# Patient Record
Sex: FEMALE | Race: OTHER
[De-identification: names, ages, dates, MRNs, and addresses within clinical notes are randomized per-mention and may not be internally consistent; named-entity substitution may affect disease eponyms.]

---

## 2017-01-10 ENCOUNTER — MEDICATION RENEWAL (OUTPATIENT)
Age: 82
End: 2017-01-10

## 2017-01-11 ENCOUNTER — RX RENEWAL (OUTPATIENT)
Age: 82
End: 2017-01-11

## 2017-01-11 ENCOUNTER — APPOINTMENT (OUTPATIENT)
Dept: FAMILY MEDICINE | Facility: CLINIC | Age: 82
End: 2017-01-11

## 2017-02-23 ENCOUNTER — RX RENEWAL (OUTPATIENT)
Age: 82
End: 2017-02-23

## 2017-03-20 ENCOUNTER — RX RENEWAL (OUTPATIENT)
Age: 82
End: 2017-03-20

## 2017-03-22 ENCOUNTER — APPOINTMENT (OUTPATIENT)
Dept: FAMILY MEDICINE | Facility: CLINIC | Age: 82
End: 2017-03-22

## 2017-03-22 VITALS
BODY MASS INDEX: 26.31 KG/M2 | HEIGHT: 62 IN | SYSTOLIC BLOOD PRESSURE: 110 MMHG | TEMPERATURE: 98.4 F | OXYGEN SATURATION: 85 % | HEART RATE: 82 BPM | WEIGHT: 143 LBS | DIASTOLIC BLOOD PRESSURE: 62 MMHG

## 2017-03-23 LAB
APPEARANCE: CLEAR
BACTERIA: NEGATIVE
BILIRUBIN URINE: NEGATIVE
BLOOD URINE: NEGATIVE
COLOR: YELLOW
GLUCOSE QUALITATIVE U: NORMAL MG/DL
HYALINE CASTS: 0 /LPF
KETONES URINE: NEGATIVE
LEUKOCYTE ESTERASE URINE: NEGATIVE
MICROSCOPIC-UA: NORMAL
NITRITE URINE: NEGATIVE
PH URINE: 5.5
PROTEIN URINE: NEGATIVE MG/DL
RED BLOOD CELLS URINE: 4 /HPF
SPECIFIC GRAVITY URINE: 1.02
SQUAMOUS EPITHELIAL CELLS: 0 /HPF
UROBILINOGEN URINE: NORMAL MG/DL
WHITE BLOOD CELLS URINE: 1 /HPF

## 2017-04-28 ENCOUNTER — APPOINTMENT (OUTPATIENT)
Dept: FAMILY MEDICINE | Facility: CLINIC | Age: 82
End: 2017-04-28

## 2017-04-28 ENCOUNTER — LABORATORY RESULT (OUTPATIENT)
Age: 82
End: 2017-04-28

## 2017-04-28 VITALS
RESPIRATION RATE: 18 BRPM | DIASTOLIC BLOOD PRESSURE: 64 MMHG | TEMPERATURE: 98.6 F | SYSTOLIC BLOOD PRESSURE: 124 MMHG | HEART RATE: 80 BPM

## 2017-04-30 LAB
APPEARANCE: ABNORMAL
BILIRUBIN URINE: NEGATIVE
BLOOD URINE: ABNORMAL
COLOR: YELLOW
GLUCOSE QUALITATIVE U: NORMAL MG/DL
KETONES URINE: NEGATIVE
LEUKOCYTE ESTERASE URINE: ABNORMAL
NITRITE URINE: POSITIVE
PH URINE: 6
PROTEIN URINE: 30 MG/DL
SPECIFIC GRAVITY URINE: 1.02
UROBILINOGEN URINE: NORMAL MG/DL

## 2017-05-18 ENCOUNTER — OTHER (OUTPATIENT)
Age: 82
End: 2017-05-18

## 2017-05-19 LAB
APPEARANCE: ABNORMAL
BACTERIA: NEGATIVE
BILIRUBIN URINE: NEGATIVE
BLOOD URINE: ABNORMAL
COLOR: ABNORMAL
GLUCOSE QUALITATIVE U: NORMAL MG/DL
HYALINE CASTS: 0 /LPF
KETONES URINE: ABNORMAL
LEUKOCYTE ESTERASE URINE: ABNORMAL
MICROSCOPIC-UA: NORMAL
NITRITE URINE: POSITIVE
PH URINE: 6
PROTEIN URINE: 100 MG/DL
RED BLOOD CELLS URINE: 3 /HPF
SPECIFIC GRAVITY URINE: 1.02
SQUAMOUS EPITHELIAL CELLS: 1 /HPF
UROBILINOGEN URINE: NORMAL MG/DL
WHITE BLOOD CELLS URINE: 119 /HPF

## 2017-05-23 LAB — BACTERIA UR CULT: ABNORMAL

## 2017-05-25 ENCOUNTER — APPOINTMENT (OUTPATIENT)
Dept: FAMILY MEDICINE | Facility: CLINIC | Age: 82
End: 2017-05-25

## 2017-05-25 VITALS
HEART RATE: 78 BPM | BODY MASS INDEX: 25.76 KG/M2 | HEIGHT: 62 IN | OXYGEN SATURATION: 98 % | TEMPERATURE: 98.4 F | RESPIRATION RATE: 15 BRPM | SYSTOLIC BLOOD PRESSURE: 120 MMHG | DIASTOLIC BLOOD PRESSURE: 78 MMHG | WEIGHT: 140 LBS

## 2017-05-25 DIAGNOSIS — R30.0 DYSURIA: ICD-10-CM

## 2017-05-25 RX ORDER — CIPROFLOXACIN HYDROCHLORIDE 500 MG/1
500 TABLET, FILM COATED ORAL
Qty: 20 | Refills: 0 | Status: DISCONTINUED | COMMUNITY
Start: 2017-04-28 | End: 2017-05-25

## 2017-05-25 RX ORDER — LEVOFLOXACIN 500 MG/1
500 TABLET, FILM COATED ORAL DAILY
Qty: 10 | Refills: 4 | Status: DISCONTINUED | COMMUNITY
Start: 2017-05-19 | End: 2017-05-25

## 2017-05-25 RX ORDER — CIPROFLOXACIN HYDROCHLORIDE 500 MG/1
500 TABLET, FILM COATED ORAL TWICE DAILY
Qty: 14 | Refills: 0 | Status: DISCONTINUED | COMMUNITY
Start: 2017-05-19 | End: 2017-05-25

## 2017-05-26 LAB
ALBUMIN SERPL ELPH-MCNC: 4.3 G/DL
ALP BLD-CCNC: 33 U/L
ALT SERPL-CCNC: 12 U/L
ANION GAP SERPL CALC-SCNC: 16 MMOL/L
AST SERPL-CCNC: 16 U/L
BASOPHILS # BLD AUTO: 0.04 K/UL
BASOPHILS NFR BLD AUTO: 0.6 %
BILIRUB SERPL-MCNC: 0.3 MG/DL
BUN SERPL-MCNC: 34 MG/DL
CALCIUM SERPL-MCNC: 9.1 MG/DL
CHLORIDE SERPL-SCNC: 107 MMOL/L
CHOLEST SERPL-MCNC: 151 MG/DL
CHOLEST/HDLC SERPL: 2.7 RATIO
CO2 SERPL-SCNC: 17 MMOL/L
CREAT SERPL-MCNC: 1.6 MG/DL
EOSINOPHIL # BLD AUTO: 0.16 K/UL
EOSINOPHIL NFR BLD AUTO: 2.3 %
FERRITIN SERPL-MCNC: 28 NG/ML
FOLATE SERPL-MCNC: >20 NG/ML
GLUCOSE SERPL-MCNC: 166 MG/DL
HBA1C MFR BLD HPLC: 6.7 %
HCT VFR BLD CALC: 34.9 %
HDLC SERPL-MCNC: 56 MG/DL
HGB BLD-MCNC: 11.2 G/DL
IMM GRANULOCYTES NFR BLD AUTO: 0.1 %
IRON SATN MFR SERPL: 23 %
IRON SERPL-MCNC: 69 UG/DL
LDLC SERPL CALC-MCNC: 63 MG/DL
LYMPHOCYTES # BLD AUTO: 1.56 K/UL
LYMPHOCYTES NFR BLD AUTO: 22.6 %
MAN DIFF?: NORMAL
MCHC RBC-ENTMCNC: 29.6 PG
MCHC RBC-ENTMCNC: 32.1 GM/DL
MCV RBC AUTO: 92.1 FL
MONOCYTES # BLD AUTO: 0.48 K/UL
MONOCYTES NFR BLD AUTO: 7 %
NEUTROPHILS # BLD AUTO: 4.65 K/UL
NEUTROPHILS NFR BLD AUTO: 67.4 %
PLATELET # BLD AUTO: 241 K/UL
POTASSIUM SERPL-SCNC: 5.2 MMOL/L
PROT SERPL-MCNC: 6.9 G/DL
RBC # BLD: 3.79 M/UL
RBC # FLD: 14.5 %
SODIUM SERPL-SCNC: 140 MMOL/L
TIBC SERPL-MCNC: 299 UG/DL
TRIGL SERPL-MCNC: 161 MG/DL
TSH SERPL-ACNC: 1.16 UIU/ML
UIBC SERPL-MCNC: 230 UG/DL
VIT B12 SERPL-MCNC: 735 PG/ML
WBC # FLD AUTO: 6.9 K/UL

## 2017-06-05 ENCOUNTER — APPOINTMENT (OUTPATIENT)
Dept: FAMILY MEDICINE | Facility: CLINIC | Age: 82
End: 2017-06-05

## 2017-06-05 VITALS
HEART RATE: 74 BPM | BODY MASS INDEX: 25.76 KG/M2 | RESPIRATION RATE: 14 BRPM | TEMPERATURE: 98.4 F | OXYGEN SATURATION: 99 % | DIASTOLIC BLOOD PRESSURE: 70 MMHG | HEIGHT: 62 IN | SYSTOLIC BLOOD PRESSURE: 110 MMHG | WEIGHT: 140 LBS

## 2017-06-05 RX ORDER — NITROFURANTOIN MACROCRYSTALS 50 MG/1
50 CAPSULE ORAL
Qty: 14 | Refills: 0 | Status: DISCONTINUED | COMMUNITY
Start: 2017-05-25 | End: 2017-06-05

## 2017-06-21 ENCOUNTER — RX RENEWAL (OUTPATIENT)
Age: 82
End: 2017-06-21

## 2017-06-21 ENCOUNTER — APPOINTMENT (OUTPATIENT)
Dept: FAMILY MEDICINE | Facility: CLINIC | Age: 82
End: 2017-06-21

## 2017-06-21 VITALS
HEIGHT: 62 IN | HEART RATE: 80 BPM | DIASTOLIC BLOOD PRESSURE: 64 MMHG | OXYGEN SATURATION: 95 % | TEMPERATURE: 98.2 F | SYSTOLIC BLOOD PRESSURE: 107 MMHG

## 2017-06-22 ENCOUNTER — RX RENEWAL (OUTPATIENT)
Age: 82
End: 2017-06-22

## 2017-06-28 LAB
APPEARANCE: CLEAR
BILIRUBIN URINE: NEGATIVE
BLOOD URINE: NEGATIVE
COLOR: YELLOW
GLUCOSE QUALITATIVE U: NORMAL MG/DL
KETONES URINE: NEGATIVE
LEUKOCYTE ESTERASE URINE: NEGATIVE
NITRITE URINE: NEGATIVE
PH URINE: 5.5
PROTEIN URINE: NEGATIVE MG/DL
SPECIFIC GRAVITY URINE: 1.01
UROBILINOGEN URINE: NORMAL MG/DL

## 2017-07-17 ENCOUNTER — RX RENEWAL (OUTPATIENT)
Age: 82
End: 2017-07-17

## 2017-07-20 ENCOUNTER — RX RENEWAL (OUTPATIENT)
Age: 82
End: 2017-07-20

## 2017-07-21 ENCOUNTER — RX RENEWAL (OUTPATIENT)
Age: 82
End: 2017-07-21

## 2017-08-16 ENCOUNTER — RX RENEWAL (OUTPATIENT)
Age: 82
End: 2017-08-16

## 2017-08-18 ENCOUNTER — RX RENEWAL (OUTPATIENT)
Age: 82
End: 2017-08-18

## 2017-09-11 ENCOUNTER — LABORATORY RESULT (OUTPATIENT)
Age: 82
End: 2017-09-11

## 2017-09-11 ENCOUNTER — APPOINTMENT (OUTPATIENT)
Dept: FAMILY MEDICINE | Facility: CLINIC | Age: 82
End: 2017-09-11
Payer: MEDICARE

## 2017-09-11 VITALS
WEIGHT: 134 LBS | RESPIRATION RATE: 14 BRPM | SYSTOLIC BLOOD PRESSURE: 130 MMHG | DIASTOLIC BLOOD PRESSURE: 70 MMHG | BODY MASS INDEX: 24.66 KG/M2 | TEMPERATURE: 97.7 F | HEIGHT: 62 IN | HEART RATE: 76 BPM | OXYGEN SATURATION: 98 %

## 2017-09-11 PROCEDURE — 90688 IIV4 VACCINE SPLT 0.5 ML IM: CPT

## 2017-09-11 PROCEDURE — 36415 COLL VENOUS BLD VENIPUNCTURE: CPT

## 2017-09-11 PROCEDURE — G0008: CPT

## 2017-09-11 PROCEDURE — 99214 OFFICE O/P EST MOD 30 MIN: CPT | Mod: 25

## 2017-09-13 LAB
ALBUMIN SERPL ELPH-MCNC: 4.6 G/DL
ALP BLD-CCNC: 38 U/L
ALT SERPL-CCNC: 10 U/L
ANION GAP SERPL CALC-SCNC: 17 MMOL/L
APPEARANCE: ABNORMAL
AST SERPL-CCNC: 15 U/L
BASOPHILS # BLD AUTO: 0.07 K/UL
BASOPHILS NFR BLD AUTO: 1 %
BILIRUB SERPL-MCNC: 0.3 MG/DL
BILIRUBIN URINE: NEGATIVE
BLOOD URINE: NEGATIVE
BUN SERPL-MCNC: 27 MG/DL
CALCIUM SERPL-MCNC: 9.7 MG/DL
CHLORIDE SERPL-SCNC: 104 MMOL/L
CHOLEST SERPL-MCNC: 177 MG/DL
CHOLEST/HDLC SERPL: 3.4 RATIO
CK SERPL-CCNC: 47 U/L
CO2 SERPL-SCNC: 19 MMOL/L
COLOR: YELLOW
CREAT SERPL-MCNC: 1.39 MG/DL
CREAT SPEC-SCNC: 93 MG/DL
EOSINOPHIL # BLD AUTO: 0.39 K/UL
EOSINOPHIL NFR BLD AUTO: 5.3 %
GLUCOSE QUALITATIVE U: NORMAL MG/DL
GLUCOSE SERPL-MCNC: 127 MG/DL
HBA1C MFR BLD HPLC: 6.1 %
HCT VFR BLD CALC: 37.2 %
HDLC SERPL-MCNC: 52 MG/DL
HGB BLD-MCNC: 12 G/DL
IMM GRANULOCYTES NFR BLD AUTO: 0.3 %
KETONES URINE: NEGATIVE
LDLC SERPL CALC-MCNC: 85 MG/DL
LEUKOCYTE ESTERASE URINE: ABNORMAL
LYMPHOCYTES # BLD AUTO: 2.22 K/UL
LYMPHOCYTES NFR BLD AUTO: 30.4 %
MAN DIFF?: NORMAL
MCHC RBC-ENTMCNC: 29.6 PG
MCHC RBC-ENTMCNC: 32.3 GM/DL
MCV RBC AUTO: 91.9 FL
MICROALBUMIN 24H UR DL<=1MG/L-MCNC: 7.6 MG/DL
MICROALBUMIN/CREAT 24H UR-RTO: 82 MG/G
MONOCYTES # BLD AUTO: 0.51 K/UL
MONOCYTES NFR BLD AUTO: 7 %
NEUTROPHILS # BLD AUTO: 4.1 K/UL
NEUTROPHILS NFR BLD AUTO: 56 %
NITRITE URINE: NEGATIVE
PH URINE: 5.5
PLATELET # BLD AUTO: 270 K/UL
POTASSIUM SERPL-SCNC: 4.7 MMOL/L
PROT SERPL-MCNC: 7.6 G/DL
PROTEIN URINE: ABNORMAL MG/DL
RBC # BLD: 4.05 M/UL
RBC # FLD: 15.4 %
SODIUM SERPL-SCNC: 140 MMOL/L
SPECIFIC GRAVITY URINE: 1.02
TRIGL SERPL-MCNC: 201 MG/DL
UROBILINOGEN URINE: NORMAL MG/DL
WBC # FLD AUTO: 7.31 K/UL

## 2017-09-20 ENCOUNTER — RX RENEWAL (OUTPATIENT)
Age: 82
End: 2017-09-20

## 2017-09-20 ENCOUNTER — APPOINTMENT (OUTPATIENT)
Dept: FAMILY MEDICINE | Facility: CLINIC | Age: 82
End: 2017-09-20

## 2017-10-11 ENCOUNTER — RX RENEWAL (OUTPATIENT)
Age: 82
End: 2017-10-11

## 2017-10-17 ENCOUNTER — MEDICATION RENEWAL (OUTPATIENT)
Age: 82
End: 2017-10-17

## 2017-11-14 ENCOUNTER — MEDICATION RENEWAL (OUTPATIENT)
Age: 82
End: 2017-11-14

## 2017-11-15 ENCOUNTER — RX RENEWAL (OUTPATIENT)
Age: 82
End: 2017-11-15

## 2017-12-11 ENCOUNTER — APPOINTMENT (OUTPATIENT)
Dept: FAMILY MEDICINE | Facility: CLINIC | Age: 82
End: 2017-12-11
Payer: MEDICARE

## 2017-12-11 ENCOUNTER — LABORATORY RESULT (OUTPATIENT)
Age: 82
End: 2017-12-11

## 2017-12-11 VITALS — TEMPERATURE: 98 F | RESPIRATION RATE: 14 BRPM

## 2017-12-11 VITALS
OXYGEN SATURATION: 95 % | SYSTOLIC BLOOD PRESSURE: 136 MMHG | BODY MASS INDEX: 25.91 KG/M2 | DIASTOLIC BLOOD PRESSURE: 70 MMHG | HEART RATE: 88 BPM | WEIGHT: 132 LBS | HEIGHT: 60 IN

## 2017-12-11 PROCEDURE — 99214 OFFICE O/P EST MOD 30 MIN: CPT | Mod: 25

## 2017-12-11 PROCEDURE — G0009: CPT

## 2017-12-11 PROCEDURE — 90732 PPSV23 VACC 2 YRS+ SUBQ/IM: CPT

## 2017-12-13 LAB
APPEARANCE: ABNORMAL
BILIRUBIN URINE: NEGATIVE
BLOOD URINE: ABNORMAL
COLOR: YELLOW
GLUCOSE QUALITATIVE U: NEGATIVE MG/DL
KETONES URINE: NEGATIVE
LEUKOCYTE ESTERASE URINE: ABNORMAL
NITRITE URINE: POSITIVE
PH URINE: 5.5
PROTEIN URINE: 30 MG/DL
SPECIFIC GRAVITY URINE: 1.02
UROBILINOGEN URINE: NEGATIVE MG/DL

## 2017-12-14 ENCOUNTER — MEDICATION RENEWAL (OUTPATIENT)
Age: 82
End: 2017-12-14

## 2017-12-15 ENCOUNTER — RX RENEWAL (OUTPATIENT)
Age: 82
End: 2017-12-15

## 2018-01-10 ENCOUNTER — MEDICATION RENEWAL (OUTPATIENT)
Age: 83
End: 2018-01-10

## 2018-01-10 RX ORDER — NITROFURANTOIN (MONOHYDRATE/MACROCRYSTALS) 25; 75 MG/1; MG/1
100 CAPSULE ORAL
Qty: 14 | Refills: 1 | Status: DISCONTINUED | COMMUNITY
Start: 2017-06-05 | End: 2018-01-10

## 2018-02-09 ENCOUNTER — MEDICATION RENEWAL (OUTPATIENT)
Age: 83
End: 2018-02-09

## 2018-02-10 ENCOUNTER — RX RENEWAL (OUTPATIENT)
Age: 83
End: 2018-02-10

## 2018-03-12 ENCOUNTER — APPOINTMENT (OUTPATIENT)
Dept: FAMILY MEDICINE | Facility: CLINIC | Age: 83
End: 2018-03-12
Payer: MEDICARE

## 2018-03-12 VITALS
TEMPERATURE: 98.2 F | SYSTOLIC BLOOD PRESSURE: 118 MMHG | WEIGHT: 132 LBS | HEIGHT: 61 IN | HEART RATE: 75 BPM | OXYGEN SATURATION: 91 % | BODY MASS INDEX: 24.92 KG/M2 | DIASTOLIC BLOOD PRESSURE: 62 MMHG

## 2018-03-12 DIAGNOSIS — Z86.718 PERSONAL HISTORY OF OTHER VENOUS THROMBOSIS AND EMBOLISM: ICD-10-CM

## 2018-03-12 PROCEDURE — 36415 COLL VENOUS BLD VENIPUNCTURE: CPT

## 2018-03-12 PROCEDURE — 99214 OFFICE O/P EST MOD 30 MIN: CPT | Mod: 25

## 2018-03-14 LAB
ALBUMIN SERPL ELPH-MCNC: 4.3 G/DL
ALP BLD-CCNC: 41 U/L
ALT SERPL-CCNC: 14 U/L
ANION GAP SERPL CALC-SCNC: 21 MMOL/L
APPEARANCE: ABNORMAL
AST SERPL-CCNC: 16 U/L
BACTERIA: ABNORMAL
BASOPHILS # BLD AUTO: 0.06 K/UL
BASOPHILS NFR BLD AUTO: 0.9 %
BILIRUB SERPL-MCNC: 0.3 MG/DL
BILIRUBIN URINE: NEGATIVE
BLOOD URINE: ABNORMAL
BUN SERPL-MCNC: 38 MG/DL
CALCIUM OXALATE CRYSTALS: ABNORMAL
CALCIUM SERPL-MCNC: 10 MG/DL
CHLORIDE SERPL-SCNC: 107 MMOL/L
CHOLEST SERPL-MCNC: 206 MG/DL
CHOLEST/HDLC SERPL: 3.8 RATIO
CK SERPL-CCNC: 40 U/L
CO2 SERPL-SCNC: 15 MMOL/L
COLOR: ABNORMAL
CREAT SERPL-MCNC: 1.58 MG/DL
CREAT SPEC-SCNC: 200 MG/DL
EOSINOPHIL # BLD AUTO: 0.43 K/UL
EOSINOPHIL NFR BLD AUTO: 6.3 %
GLUCOSE QUALITATIVE U: NEGATIVE MG/DL
GLUCOSE SERPL-MCNC: 126 MG/DL
HBA1C MFR BLD HPLC: 6.5 %
HCT VFR BLD CALC: 35.4 %
HDLC SERPL-MCNC: 54 MG/DL
HGB BLD-MCNC: 11.6 G/DL
HYALINE CASTS: 2 /LPF
IMM GRANULOCYTES NFR BLD AUTO: 0.1 %
KETONES URINE: ABNORMAL
LDLC SERPL CALC-MCNC: 117 MG/DL
LEUKOCYTE ESTERASE URINE: ABNORMAL
LYMPHOCYTES # BLD AUTO: 1.96 K/UL
LYMPHOCYTES NFR BLD AUTO: 28.7 %
MAN DIFF?: NORMAL
MCHC RBC-ENTMCNC: 30.7 PG
MCHC RBC-ENTMCNC: 32.8 GM/DL
MCV RBC AUTO: 93.7 FL
MICROALBUMIN 24H UR DL<=1MG/L-MCNC: 19.5 MG/DL
MICROALBUMIN/CREAT 24H UR-RTO: 98 MG/G
MICROSCOPIC-UA: NORMAL
MONOCYTES # BLD AUTO: 0.6 K/UL
MONOCYTES NFR BLD AUTO: 8.8 %
NEUTROPHILS # BLD AUTO: 3.78 K/UL
NEUTROPHILS NFR BLD AUTO: 55.2 %
NITRITE URINE: NEGATIVE
PH URINE: 5
PLATELET # BLD AUTO: 244 K/UL
POTASSIUM SERPL-SCNC: 5.1 MMOL/L
PROT SERPL-MCNC: 7.4 G/DL
PROTEIN URINE: 30 MG/DL
RBC # BLD: 3.78 M/UL
RBC # FLD: 13.9 %
RED BLOOD CELLS URINE: 18 /HPF
SODIUM SERPL-SCNC: 143 MMOL/L
SPECIFIC GRAVITY URINE: 1.02
SQUAMOUS EPITHELIAL CELLS: 4 /HPF
T4 FREE SERPL-MCNC: 1.2 NG/DL
TRIGL SERPL-MCNC: 173 MG/DL
TSH SERPL-ACNC: 1.65 UIU/ML
UROBILINOGEN URINE: NEGATIVE MG/DL
VIT B12 SERPL-MCNC: 455 PG/ML
WBC # FLD AUTO: 6.84 K/UL
WHITE BLOOD CELLS URINE: >720 /HPF

## 2018-03-26 ENCOUNTER — RX RENEWAL (OUTPATIENT)
Age: 83
End: 2018-03-26

## 2018-03-29 ENCOUNTER — OUTPATIENT (OUTPATIENT)
Dept: OUTPATIENT SERVICES | Facility: HOSPITAL | Age: 83
LOS: 1 days | End: 2018-03-29
Payer: COMMERCIAL

## 2018-03-29 ENCOUNTER — APPOINTMENT (OUTPATIENT)
Dept: ULTRASOUND IMAGING | Facility: HOSPITAL | Age: 83
End: 2018-03-29
Payer: MEDICARE

## 2018-03-29 DIAGNOSIS — N39.0 URINARY TRACT INFECTION, SITE NOT SPECIFIED: ICD-10-CM

## 2018-03-29 PROCEDURE — 76770 US EXAM ABDO BACK WALL COMP: CPT | Mod: 26

## 2018-03-29 PROCEDURE — 76770 US EXAM ABDO BACK WALL COMP: CPT

## 2018-04-09 ENCOUNTER — RX RENEWAL (OUTPATIENT)
Age: 83
End: 2018-04-09

## 2018-04-16 ENCOUNTER — RX RENEWAL (OUTPATIENT)
Age: 83
End: 2018-04-16

## 2018-04-25 ENCOUNTER — RX RENEWAL (OUTPATIENT)
Age: 83
End: 2018-04-25

## 2018-04-27 ENCOUNTER — RX RENEWAL (OUTPATIENT)
Age: 83
End: 2018-04-27

## 2018-05-25 ENCOUNTER — RX RENEWAL (OUTPATIENT)
Age: 83
End: 2018-05-25

## 2018-06-08 ENCOUNTER — APPOINTMENT (OUTPATIENT)
Dept: FAMILY MEDICINE | Facility: CLINIC | Age: 83
End: 2018-06-08
Payer: MEDICARE

## 2018-06-08 VITALS
HEART RATE: 85 BPM | WEIGHT: 132 LBS | OXYGEN SATURATION: 96 % | SYSTOLIC BLOOD PRESSURE: 112 MMHG | HEIGHT: 60 IN | BODY MASS INDEX: 25.91 KG/M2 | TEMPERATURE: 98 F | DIASTOLIC BLOOD PRESSURE: 56 MMHG

## 2018-06-08 VITALS — RESPIRATION RATE: 16 BRPM

## 2018-06-08 DIAGNOSIS — Z87.440 PERSONAL HISTORY OF URINARY (TRACT) INFECTIONS: ICD-10-CM

## 2018-06-08 PROCEDURE — 99214 OFFICE O/P EST MOD 30 MIN: CPT

## 2018-06-08 NOTE — HISTORY OF PRESENT ILLNESS
[Coronary Artery Disease] : Coronary Artery Disease [Depression] : Depression [Diabetes Mellitus] : Diabetes Mellitus [Hyperlipidemia] : Hyperlipidemia [Hypertension] : Hypertension [FreeTextEntry6] : Issue is an 85-year-old female, who presents today for followup disease management. Patient states, that she's been in her usual state of health recently seen by urology for recurrent urinary tract infections. Patient's medical history is significant for anxiety with underlying depression well controlled. Ischemic heart disease, diabetes mellitus, type II hypertension, hyperlipidemia, and as stated earlier, recurrent urinary tract infections. Patient is awake, alert, and oriented x3, presently in no acute distress. She is calm, cooperative, well-groomed, and nourished. Patient denies any chest pain, shortness of breath, nausea, or vomiting, but still complains of occasional urinary symptoms, described as burning and frequency. Patient does have followup appointment with urology [Does not check] : Patient does not check blood glucose regularly [Understanding of foot care] : Patient expressed understanding of foot care [Retinopathy] : No retinopathy [Most Recent A1C: ___] : Most recent A1C was [unfilled] [Moderate Intensity] : Patient is currently on moderate intensity statin  therapy [EyeExamDate] : 06/17 [Does not check BP] : The patient is not checking blood pressure [<130/90] : Target blood pressure is  <130/90 [Target goal met] : BP target goal met [Doing Well] : Patient is doing well [Managed with medications] : managed with  medication [Moderate Intensity Therapy] : Patient is currently on moderate intensity statin  therapy [Symptoms Limit Activities] : Symptoms do not limit ~his/her~ activities [Stable] : Overall status has been stable [No New Symptoms] : Patient denies any new symptoms

## 2018-06-08 NOTE — REVIEW OF SYSTEMS
[Fever] : no fever [Chills] : no chills [Fatigue] : fatigue [Hot Flashes] : no hot flashes [Night Sweats] : no night sweats [Recent Change In Weight] : ~T no recent weight change [Negative] : Heme/Lymph

## 2018-06-08 NOTE — ASSESSMENT
[FreeTextEntry1] : Assessment and plan:\par \par 1. Hypertension excellent blood pressure control. We will continue amlodipine 5 mg p.o. daily, and lisinopril 20 mg with valsartan 160 mg. Patient's blood pressure is under excellent control. No change in present medical management.\par \par 2. Hyperlipidemia. Patient tolerating lovastatin 20 mg at bedtime. Cholesterol is under good control.\par \par 3. Diabetes mellitus, type II. Most recent hemoglobin A1c 6.5, which is good control. Patient will continue metformin 500 mg p.o. daily twice a day. We will continue to follow closely. Her kidney functions, presently stable, and no change.\par \par 4. Ischemic heart disease. Continue Xarelto patient presently chest pain-free and feeling well.\par \par 5. Anxiety/depression. Continue alprazolam and sertraline. Patient states that she is no longer on Zoloft feeling well without the medications.\par \par Patient will followup in approximately 3 months at that time. I will obtain comprehensive blood work.

## 2018-06-08 NOTE — HEALTH RISK ASSESSMENT
[] : No [No falls in past year] : Patient reported no falls in the past year [0] : 2) Feeling down, depressed, or hopeless: Not at all (0) [de-identified] : social [[id number]] : 0

## 2018-06-11 ENCOUNTER — RX RENEWAL (OUTPATIENT)
Age: 83
End: 2018-06-11

## 2018-06-25 ENCOUNTER — RX RENEWAL (OUTPATIENT)
Age: 83
End: 2018-06-25

## 2018-07-30 ENCOUNTER — RX RENEWAL (OUTPATIENT)
Age: 83
End: 2018-07-30

## 2018-08-06 ENCOUNTER — RX RENEWAL (OUTPATIENT)
Age: 83
End: 2018-08-06

## 2018-08-27 ENCOUNTER — RX RENEWAL (OUTPATIENT)
Age: 83
End: 2018-08-27

## 2018-09-07 ENCOUNTER — APPOINTMENT (OUTPATIENT)
Dept: FAMILY MEDICINE | Facility: CLINIC | Age: 83
End: 2018-09-07
Payer: MEDICARE

## 2018-09-07 VITALS
SYSTOLIC BLOOD PRESSURE: 116 MMHG | OXYGEN SATURATION: 96 % | BODY MASS INDEX: 25.91 KG/M2 | WEIGHT: 132 LBS | DIASTOLIC BLOOD PRESSURE: 60 MMHG | TEMPERATURE: 98.2 F | HEIGHT: 60 IN | HEART RATE: 91 BPM

## 2018-09-07 VITALS — RESPIRATION RATE: 16 BRPM

## 2018-09-07 DIAGNOSIS — R07.89 OTHER CHEST PAIN: ICD-10-CM

## 2018-09-07 PROCEDURE — 93000 ELECTROCARDIOGRAM COMPLETE: CPT | Mod: 59

## 2018-09-07 PROCEDURE — 99215 OFFICE O/P EST HI 40 MIN: CPT | Mod: 25

## 2018-09-07 PROCEDURE — 36415 COLL VENOUS BLD VENIPUNCTURE: CPT

## 2018-09-07 NOTE — PHYSICAL EXAM

## 2018-09-07 NOTE — REVIEW OF SYSTEMS
[Fatigue] : fatigue [Chest Pain] : chest pain [Anxiety] : anxiety [Negative] : Heme/Lymph [Fever] : no fever [Chills] : no chills [Hot Flashes] : no hot flashes [Night Sweats] : no night sweats [Recent Change In Weight] : ~T no recent weight change [Palpitations] : no palpitations [Leg Claudication] : no leg claudication [Lower Ext Edema] : no lower extremity edema [Orthopnea] : no orthopnea [Paroysmal Nocturnal Dyspnea] : no paroysmal nocturnal dyspnea

## 2018-09-07 NOTE — HEALTH RISK ASSESSMENT
[No falls in past year] : Patient reported no falls in the past year [0] : 2) Feeling down, depressed, or hopeless: Not at all (0) [] : No [[id number]] : 0

## 2018-09-07 NOTE — HISTORY OF PRESENT ILLNESS
[FreeTextEntry1] : See history of present illness [de-identified] : Issues 86-year-old female, who presents today for a followup disease management visit. She has multiple medical problems history of ischemic heart disease. Chest scratch that diabetes mellitus, type II, fatigue, generalized anxiety, hypertension, hyperlipidemia. Patient's chief complaint today is chest pressure. She denies any neck, arm or jaw discomfort. It's retrosternal localized on the left-hand side. She denies any nausea or vomiting. Presently. She is awake, alert, and oriented x3 in no acute distress. Stat electrocardiogram will be obtained now. Patient also complaining of mild to moderate fatigue. Her vital signs are all good.

## 2018-09-07 NOTE — ASSESSMENT
[FreeTextEntry1] : Assessment and plan:\par \par 1. Atypical chest pain/chest pressure. Electrocardiogram obtained shows no acute ST-T wave changes compared to old electrocardiogram, which was done by cardiology in 2015. There are no acute changes. But for completeness sake, and with patient's history, I would prefer comprehensive cardiac workup, which will include echocardiogram, cardiology evaluation and stress test.\par \par 2. Hypertension excellent blood pressure control. Continue lisinopril 20 mg p.o. daily with amlodipine 5 mg p.o. daily. Patient also taking valsartan 160 mg p.o. daily.\par \par 3. Hyperlipidemia. Patient tolerating lovastatin 20 mg p.o. daily.\par \par 4. Generalized anxiety continue alprazolam 0.25 mg as needed.\par \par Detailed discussion with patient regarding the chest discomfort, that she's been having, which may be multifactorial and also maybe secondary to the heat, that we've been experiencing, but for completeness sake, I do want to cardiology workup and if the patient does develop chest discomfort. I recommend she go directly to the emergency room. Patient said that she will if she experiences at.\par \par Face-to-face with patient 40 minutes driving of time was spent on physical exam, obtaining electrocardiogram. Detailed discussion with the significance of chest pain

## 2018-09-08 DIAGNOSIS — R89.9 UNSPECIFIED ABNORMAL FINDING IN SPECIMENS FROM OTHER ORGANS, SYSTEMS AND TISSUES: ICD-10-CM

## 2018-09-08 LAB
ALBUMIN SERPL ELPH-MCNC: 4.5 G/DL
ALP BLD-CCNC: 44 U/L
ALT SERPL-CCNC: 11 U/L
ANION GAP SERPL CALC-SCNC: 16 MMOL/L
AST SERPL-CCNC: 13 U/L
BASOPHILS # BLD AUTO: 0.05 K/UL
BASOPHILS NFR BLD AUTO: 0.5 %
BILIRUB SERPL-MCNC: 0.3 MG/DL
BUN SERPL-MCNC: 33 MG/DL
CALCIUM SERPL-MCNC: 9.5 MG/DL
CHLORIDE SERPL-SCNC: 110 MMOL/L
CHOLEST SERPL-MCNC: 264 MG/DL
CHOLEST/HDLC SERPL: 6.9 RATIO
CK SERPL-CCNC: 29 U/L
CO2 SERPL-SCNC: 15 MMOL/L
CREAT SERPL-MCNC: 1.58 MG/DL
EOSINOPHIL # BLD AUTO: 0.3 K/UL
EOSINOPHIL NFR BLD AUTO: 3.1 %
GLUCOSE SERPL-MCNC: 160 MG/DL
HBA1C MFR BLD HPLC: 6.6 %
HCT VFR BLD CALC: 34.5 %
HDLC SERPL-MCNC: 38 MG/DL
HGB BLD-MCNC: 10.7 G/DL
IMM GRANULOCYTES NFR BLD AUTO: 0.3 %
LDLC SERPL CALC-MCNC: 158 MG/DL
LYMPHOCYTES # BLD AUTO: 1.77 K/UL
LYMPHOCYTES NFR BLD AUTO: 18.1 %
MAN DIFF?: NORMAL
MCHC RBC-ENTMCNC: 28.8 PG
MCHC RBC-ENTMCNC: 31 GM/DL
MCV RBC AUTO: 93 FL
MONOCYTES # BLD AUTO: 0.62 K/UL
MONOCYTES NFR BLD AUTO: 6.4 %
NEUTROPHILS # BLD AUTO: 6.99 K/UL
NEUTROPHILS NFR BLD AUTO: 71.6 %
PLATELET # BLD AUTO: 201 K/UL
POTASSIUM SERPL-SCNC: 4.4 MMOL/L
PROT SERPL-MCNC: 6.9 G/DL
RBC # BLD: 3.71 M/UL
RBC # FLD: 15.8 %
SODIUM SERPL-SCNC: 141 MMOL/L
T4 FREE SERPL-MCNC: 1.3 NG/DL
TRIGL SERPL-MCNC: 339 MG/DL
TSH SERPL-ACNC: 1.53 UIU/ML
WBC # FLD AUTO: 9.76 K/UL

## 2018-09-12 PROBLEM — R89.9 ABNORMAL LABORATORY TEST RESULT: Status: ACTIVE | Noted: 2018-09-12

## 2018-09-13 LAB
FERRITIN SERPL-MCNC: 98 NG/ML
FOLATE SERPL-MCNC: >20 NG/ML
IRON SATN MFR SERPL: 21 %
IRON SERPL-MCNC: 46 UG/DL
TIBC SERPL-MCNC: 217 UG/DL
TRANSFERRIN SERPL-MCNC: 163 MG/DL
UIBC SERPL-MCNC: 171 UG/DL
VIT B12 SERPL-MCNC: 643 PG/ML

## 2018-09-26 ENCOUNTER — RX RENEWAL (OUTPATIENT)
Age: 83
End: 2018-09-26

## 2018-10-04 ENCOUNTER — RX RENEWAL (OUTPATIENT)
Age: 83
End: 2018-10-04

## 2018-10-21 ENCOUNTER — RX RENEWAL (OUTPATIENT)
Age: 83
End: 2018-10-21

## 2018-10-23 ENCOUNTER — MEDICATION RENEWAL (OUTPATIENT)
Age: 83
End: 2018-10-23

## 2018-11-02 ENCOUNTER — APPOINTMENT (OUTPATIENT)
Dept: FAMILY MEDICINE | Facility: CLINIC | Age: 83
End: 2018-11-02
Payer: MEDICARE

## 2018-11-02 VITALS
TEMPERATURE: 98.1 F | OXYGEN SATURATION: 96 % | DIASTOLIC BLOOD PRESSURE: 56 MMHG | SYSTOLIC BLOOD PRESSURE: 110 MMHG | HEART RATE: 95 BPM

## 2018-11-02 VITALS — RESPIRATION RATE: 16 BRPM

## 2018-11-02 PROCEDURE — 36415 COLL VENOUS BLD VENIPUNCTURE: CPT

## 2018-11-02 PROCEDURE — G0008: CPT

## 2018-11-02 PROCEDURE — 90472 IMMUNIZATION ADMIN EACH ADD: CPT

## 2018-11-02 PROCEDURE — G0009: CPT

## 2018-11-02 PROCEDURE — 99215 OFFICE O/P EST HI 40 MIN: CPT | Mod: 25

## 2018-11-02 PROCEDURE — 90670 PCV13 VACCINE IM: CPT

## 2018-11-02 PROCEDURE — 90686 IIV4 VACC NO PRSV 0.5 ML IM: CPT

## 2018-11-02 NOTE — REVIEW OF SYSTEMS
[Fever] : no fever [Chills] : no chills [Fatigue] : fatigue [Hot Flashes] : no hot flashes [Night Sweats] : no night sweats [Recent Change In Weight] : ~T no recent weight change [Chest Pain] : no chest pain [Palpitations] : no palpitations [Leg Claudication] : no leg claudication [Lower Ext Edema] : no lower extremity edema [Orthopnea] : no orthopnea [Paroysmal Nocturnal Dyspnea] : no paroysmal nocturnal dyspnea [Anxiety] : anxiety [Negative] : Heme/Lymph

## 2018-11-02 NOTE — HEALTH RISK ASSESSMENT
[No falls in past year] : Patient reported no falls in the past year [0] : 2) Feeling down, depressed, or hopeless: Not at all (0) [[id number]] : 0

## 2018-11-02 NOTE — PHYSICAL EXAM

## 2018-11-02 NOTE — HISTORY OF PRESENT ILLNESS
[FreeTextEntry1] : Please see history of present illness [de-identified] : Patient is an 86-year-old female, who presents today for followup disease management with a chief complaint of increasing fatigue. Patient had similar complaints at the last visit. Comprehensive blood work was obtained at that time, which showed that the patient is slightly anemic and with low iron stores, and low iron in the serum.\par \par Detailed discussion with patient regarding iron deficiency anemia. I will give patient test for fecal occult blood. Immunology and I will reobtain comprehensive blood work today.\par \par Medical history is significant for anxiety, diabetes mellitus, type II, fatigue, hypertension, hyperlipidemia.\par \par Patient's only complaint is fatigue. She denies any chest pain, shortness of breath, nausea, or vomiting.\par \par While speaking to the patient it appears that she gets a little confused, but she is awake, alert, and oriented x3. She also exhibits mild to moderate tremor, which is not usual for her

## 2018-11-02 NOTE — ASSESSMENT
[FreeTextEntry1] : Assessment and plan:\par \par During exam. Patient was a little confused, which is unusual for complaining of severe fatigue. Physical exam was basically unremarkable. No acute findings. I reviewed previous blood work, which show a mild anemia, and iron was low normal. Since the patient was a little bit confused, complaining of severe fatigue. I was considering the possibility of sending her into the hospital. She then told me that her daughter-in-law was waiting in the parking lot.\par \par Her daughter-in-law socially came in detail discussion on iron deficiency anemia. Patient needs to be worked up for a possible GI bleed test for fecal occult blood. Will be done. Comprehensive physical exam was done. No acute findings. Comprehensive blood work obtained. I discussed with patient and daughter-in-law. The importance to let me know if anything acute is happening. Symptoms persist changing character, confusion worsens, patient is to go directly to the emergency room.\par \par I was face-to-face with the patient and daughter-in-law 45 minutes.\par \par Patient has been on alprazolam for many years. Unfortunately, we cannot stop the medication, cold turkey. I had a detailed discussion with daughter-in-law also about these medications, which can be causing the confusion and also the extreme fatigue. It appears that the patient finished her medications before time in other words, it appears she took more than she should have this can be contributing to her confusion and persistent fatigue.

## 2018-11-05 LAB
BASOPHILS # BLD AUTO: 0.06 K/UL
BASOPHILS NFR BLD AUTO: 0.7 %
EOSINOPHIL # BLD AUTO: 0.25 K/UL
EOSINOPHIL NFR BLD AUTO: 3 %
FERRITIN SERPL-MCNC: 90 NG/ML
FOLATE SERPL-MCNC: >20 NG/ML
HCT VFR BLD CALC: 36 %
HGB BLD-MCNC: 11.6 G/DL
IMM GRANULOCYTES NFR BLD AUTO: 0.2 %
IRON SATN MFR SERPL: 26 %
IRON SERPL-MCNC: 64 UG/DL
LYMPHOCYTES # BLD AUTO: 2.26 K/UL
LYMPHOCYTES NFR BLD AUTO: 26.9 %
MAN DIFF?: NORMAL
MCHC RBC-ENTMCNC: 30.5 PG
MCHC RBC-ENTMCNC: 32.2 GM/DL
MCV RBC AUTO: 94.7 FL
MONOCYTES # BLD AUTO: 0.63 K/UL
MONOCYTES NFR BLD AUTO: 7.5 %
NEUTROPHILS # BLD AUTO: 5.19 K/UL
NEUTROPHILS NFR BLD AUTO: 61.7 %
PLATELET # BLD AUTO: 217 K/UL
RBC # BLD: 3.8 M/UL
RBC # FLD: 15.7 %
TIBC SERPL-MCNC: 243 UG/DL
UIBC SERPL-MCNC: 179 UG/DL
VIT B12 SERPL-MCNC: 415 PG/ML
WBC # FLD AUTO: 8.41 K/UL

## 2018-11-10 LAB — HEMOCCULT STL QL IA: POSITIVE

## 2018-12-05 ENCOUNTER — RX RENEWAL (OUTPATIENT)
Age: 83
End: 2018-12-05

## 2018-12-12 ENCOUNTER — TRANSCRIPTION ENCOUNTER (OUTPATIENT)
Age: 83
End: 2018-12-12

## 2018-12-13 ENCOUNTER — INPATIENT (INPATIENT)
Facility: HOSPITAL | Age: 83
LOS: 6 days | Discharge: NURSING, REHAB OR LONG-TERM CARE | DRG: 470 | End: 2018-12-20
Attending: STUDENT IN AN ORGANIZED HEALTH CARE EDUCATION/TRAINING PROGRAM | Admitting: STUDENT IN AN ORGANIZED HEALTH CARE EDUCATION/TRAINING PROGRAM
Payer: COMMERCIAL

## 2018-12-13 ENCOUNTER — RESULT REVIEW (OUTPATIENT)
Age: 83
End: 2018-12-13

## 2018-12-13 VITALS
RESPIRATION RATE: 16 BRPM | WEIGHT: 138.01 LBS | OXYGEN SATURATION: 98 % | HEART RATE: 93 BPM | SYSTOLIC BLOOD PRESSURE: 160 MMHG | DIASTOLIC BLOOD PRESSURE: 82 MMHG | TEMPERATURE: 98 F

## 2018-12-13 DIAGNOSIS — Z90.49 ACQUIRED ABSENCE OF OTHER SPECIFIED PARTS OF DIGESTIVE TRACT: Chronic | ICD-10-CM

## 2018-12-13 DIAGNOSIS — S72.009A FRACTURE OF UNSPECIFIED PART OF NECK OF UNSPECIFIED FEMUR, INITIAL ENCOUNTER FOR CLOSED FRACTURE: ICD-10-CM

## 2018-12-13 LAB
ABO RH CONFIRMATION: SIGNIFICANT CHANGE UP
ALBUMIN SERPL ELPH-MCNC: 3.5 G/DL — SIGNIFICANT CHANGE UP (ref 3.3–5)
ALP SERPL-CCNC: 39 U/L — LOW (ref 40–120)
ALT FLD-CCNC: 24 U/L DA — SIGNIFICANT CHANGE UP (ref 10–45)
ANION GAP SERPL CALC-SCNC: 14 MMOL/L — SIGNIFICANT CHANGE UP (ref 5–17)
APTT BLD: 28.8 SEC — SIGNIFICANT CHANGE UP (ref 27.5–36.3)
AST SERPL-CCNC: 24 U/L — SIGNIFICANT CHANGE UP (ref 10–40)
BASOPHILS # BLD AUTO: 0.1 K/UL — SIGNIFICANT CHANGE UP (ref 0–0.2)
BASOPHILS NFR BLD AUTO: 0.8 % — SIGNIFICANT CHANGE UP (ref 0–2)
BILIRUB SERPL-MCNC: 0.4 MG/DL — SIGNIFICANT CHANGE UP (ref 0.2–1.2)
BLD GP AB SCN SERPL QL: SIGNIFICANT CHANGE UP
BUN SERPL-MCNC: 34 MG/DL — HIGH (ref 7–23)
CALCIUM SERPL-MCNC: 9.8 MG/DL — SIGNIFICANT CHANGE UP (ref 8.4–10.5)
CHLORIDE SERPL-SCNC: 110 MMOL/L — HIGH (ref 96–108)
CO2 SERPL-SCNC: 18 MMOL/L — LOW (ref 22–31)
CREAT SERPL-MCNC: 2.19 MG/DL — HIGH (ref 0.5–1.3)
EOSINOPHIL # BLD AUTO: 0.3 K/UL — SIGNIFICANT CHANGE UP (ref 0–0.5)
EOSINOPHIL NFR BLD AUTO: 2.5 % — SIGNIFICANT CHANGE UP (ref 0–6)
GLUCOSE BLDC GLUCOMTR-MCNC: 115 MG/DL — HIGH (ref 70–99)
GLUCOSE BLDC GLUCOMTR-MCNC: 159 MG/DL — HIGH (ref 70–99)
GLUCOSE SERPL-MCNC: 182 MG/DL — HIGH (ref 70–99)
HCT VFR BLD CALC: 34.9 % — SIGNIFICANT CHANGE UP (ref 34.5–45)
HGB BLD-MCNC: 11.7 G/DL — SIGNIFICANT CHANGE UP (ref 11.5–15.5)
INR BLD: 1.01 RATIO — SIGNIFICANT CHANGE UP (ref 0.88–1.16)
LYMPHOCYTES # BLD AUTO: 1.2 K/UL — SIGNIFICANT CHANGE UP (ref 1–3.3)
LYMPHOCYTES # BLD AUTO: 9.5 % — LOW (ref 13–44)
MCHC RBC-ENTMCNC: 31.7 PG — SIGNIFICANT CHANGE UP (ref 27–34)
MCHC RBC-ENTMCNC: 33.6 GM/DL — SIGNIFICANT CHANGE UP (ref 32–36)
MCV RBC AUTO: 94.4 FL — SIGNIFICANT CHANGE UP (ref 80–100)
MONOCYTES # BLD AUTO: 0.7 K/UL — SIGNIFICANT CHANGE UP (ref 0–0.9)
MONOCYTES NFR BLD AUTO: 5.6 % — SIGNIFICANT CHANGE UP (ref 2–14)
NEUTROPHILS # BLD AUTO: 9.9 K/UL — HIGH (ref 1.8–7.4)
NEUTROPHILS NFR BLD AUTO: 81.6 % — HIGH (ref 43–77)
PLATELET # BLD AUTO: 181 K/UL — SIGNIFICANT CHANGE UP (ref 150–400)
POTASSIUM SERPL-MCNC: 4.7 MMOL/L — SIGNIFICANT CHANGE UP (ref 3.5–5.3)
POTASSIUM SERPL-SCNC: 4.7 MMOL/L — SIGNIFICANT CHANGE UP (ref 3.5–5.3)
PROT SERPL-MCNC: 7.3 G/DL — SIGNIFICANT CHANGE UP (ref 6–8.3)
PROTHROM AB SERPL-ACNC: 11.3 SEC — SIGNIFICANT CHANGE UP (ref 10–12.9)
RBC # BLD: 3.7 M/UL — LOW (ref 3.8–5.2)
RBC # FLD: 12.7 % — SIGNIFICANT CHANGE UP (ref 10.3–14.5)
SODIUM SERPL-SCNC: 142 MMOL/L — SIGNIFICANT CHANGE UP (ref 135–145)
WBC # BLD: 12.2 K/UL — HIGH (ref 3.8–10.5)
WBC # FLD AUTO: 12.2 K/UL — HIGH (ref 3.8–10.5)

## 2018-12-13 PROCEDURE — 73502 X-RAY EXAM HIP UNI 2-3 VIEWS: CPT | Mod: 26,LT

## 2018-12-13 PROCEDURE — 72170 X-RAY EXAM OF PELVIS: CPT | Mod: 26,59

## 2018-12-13 PROCEDURE — 71045 X-RAY EXAM CHEST 1 VIEW: CPT | Mod: 26

## 2018-12-13 PROCEDURE — 99223 1ST HOSP IP/OBS HIGH 75: CPT

## 2018-12-13 PROCEDURE — 71250 CT THORAX DX C-: CPT | Mod: 26

## 2018-12-13 PROCEDURE — 99285 EMERGENCY DEPT VISIT HI MDM: CPT

## 2018-12-13 PROCEDURE — 27236 TREAT THIGH FRACTURE: CPT | Mod: AS,LT

## 2018-12-13 PROCEDURE — 93010 ELECTROCARDIOGRAM REPORT: CPT

## 2018-12-13 PROCEDURE — 88305 TISSUE EXAM BY PATHOLOGIST: CPT | Mod: 26

## 2018-12-13 PROCEDURE — 27236 TREAT THIGH FRACTURE: CPT | Mod: LT

## 2018-12-13 PROCEDURE — 88311 DECALCIFY TISSUE: CPT | Mod: 26

## 2018-12-13 RX ORDER — GLUCAGON INJECTION, SOLUTION 0.5 MG/.1ML
1 INJECTION, SOLUTION SUBCUTANEOUS ONCE
Qty: 0 | Refills: 0 | Status: DISCONTINUED | OUTPATIENT
Start: 2018-12-13 | End: 2018-12-13

## 2018-12-13 RX ORDER — OXYCODONE HYDROCHLORIDE 5 MG/1
10 TABLET ORAL EVERY 4 HOURS
Qty: 0 | Refills: 0 | Status: DISCONTINUED | OUTPATIENT
Start: 2018-12-13 | End: 2018-12-18

## 2018-12-13 RX ORDER — HYDROMORPHONE HYDROCHLORIDE 2 MG/ML
0.5 INJECTION INTRAMUSCULAR; INTRAVENOUS; SUBCUTANEOUS
Qty: 0 | Refills: 0 | Status: DISCONTINUED | OUTPATIENT
Start: 2018-12-13 | End: 2018-12-14

## 2018-12-13 RX ORDER — ONDANSETRON 8 MG/1
4 TABLET, FILM COATED ORAL ONCE
Qty: 0 | Refills: 0 | Status: DISCONTINUED | OUTPATIENT
Start: 2018-12-13 | End: 2018-12-13

## 2018-12-13 RX ORDER — MAGNESIUM HYDROXIDE 400 MG/1
30 TABLET, CHEWABLE ORAL AT BEDTIME
Qty: 0 | Refills: 0 | Status: DISCONTINUED | OUTPATIENT
Start: 2018-12-13 | End: 2018-12-20

## 2018-12-13 RX ORDER — DEXTROSE 50 % IN WATER 50 %
25 SYRINGE (ML) INTRAVENOUS ONCE
Qty: 0 | Refills: 0 | Status: DISCONTINUED | OUTPATIENT
Start: 2018-12-13 | End: 2018-12-20

## 2018-12-13 RX ORDER — PANTOPRAZOLE SODIUM 20 MG/1
40 TABLET, DELAYED RELEASE ORAL
Qty: 0 | Refills: 0 | Status: DISCONTINUED | OUTPATIENT
Start: 2018-12-13 | End: 2018-12-13

## 2018-12-13 RX ORDER — DEXTROSE 50 % IN WATER 50 %
12.5 SYRINGE (ML) INTRAVENOUS ONCE
Qty: 0 | Refills: 0 | Status: DISCONTINUED | OUTPATIENT
Start: 2018-12-13 | End: 2018-12-13

## 2018-12-13 RX ORDER — MORPHINE SULFATE 50 MG/1
2 CAPSULE, EXTENDED RELEASE ORAL ONCE
Qty: 0 | Refills: 0 | Status: DISCONTINUED | OUTPATIENT
Start: 2018-12-13 | End: 2018-12-13

## 2018-12-13 RX ORDER — INSULIN LISPRO 100/ML
VIAL (ML) SUBCUTANEOUS
Qty: 0 | Refills: 0 | Status: DISCONTINUED | OUTPATIENT
Start: 2018-12-13 | End: 2018-12-20

## 2018-12-13 RX ORDER — SODIUM CHLORIDE 9 MG/ML
1000 INJECTION, SOLUTION INTRAVENOUS
Qty: 0 | Refills: 0 | Status: DISCONTINUED | OUTPATIENT
Start: 2018-12-13 | End: 2018-12-16

## 2018-12-13 RX ORDER — SODIUM CHLORIDE 9 MG/ML
3 INJECTION INTRAMUSCULAR; INTRAVENOUS; SUBCUTANEOUS ONCE
Qty: 0 | Refills: 0 | Status: COMPLETED | OUTPATIENT
Start: 2018-12-13 | End: 2018-12-13

## 2018-12-13 RX ORDER — INSULIN LISPRO 100/ML
VIAL (ML) SUBCUTANEOUS AT BEDTIME
Qty: 0 | Refills: 0 | Status: DISCONTINUED | OUTPATIENT
Start: 2018-12-13 | End: 2018-12-13

## 2018-12-13 RX ORDER — SODIUM CHLORIDE 9 MG/ML
1000 INJECTION, SOLUTION INTRAVENOUS
Qty: 0 | Refills: 0 | Status: DISCONTINUED | OUTPATIENT
Start: 2018-12-13 | End: 2018-12-13

## 2018-12-13 RX ORDER — ATORVASTATIN CALCIUM 80 MG/1
10 TABLET, FILM COATED ORAL AT BEDTIME
Qty: 0 | Refills: 0 | Status: DISCONTINUED | OUTPATIENT
Start: 2018-12-13 | End: 2018-12-13

## 2018-12-13 RX ORDER — ONDANSETRON 8 MG/1
4 TABLET, FILM COATED ORAL EVERY 6 HOURS
Qty: 0 | Refills: 0 | Status: DISCONTINUED | OUTPATIENT
Start: 2018-12-13 | End: 2018-12-20

## 2018-12-13 RX ORDER — ALPRAZOLAM 0.25 MG
0.25 TABLET ORAL DAILY
Qty: 0 | Refills: 0 | Status: DISCONTINUED | OUTPATIENT
Start: 2018-12-13 | End: 2018-12-16

## 2018-12-13 RX ORDER — AMLODIPINE BESYLATE 2.5 MG/1
5 TABLET ORAL DAILY
Qty: 0 | Refills: 0 | Status: DISCONTINUED | OUTPATIENT
Start: 2018-12-14 | End: 2018-12-20

## 2018-12-13 RX ORDER — ALPRAZOLAM 0.25 MG
0.25 TABLET ORAL DAILY
Qty: 0 | Refills: 0 | Status: DISCONTINUED | OUTPATIENT
Start: 2018-12-13 | End: 2018-12-13

## 2018-12-13 RX ORDER — CHLORHEXIDINE GLUCONATE 213 G/1000ML
1 SOLUTION TOPICAL ONCE
Qty: 0 | Refills: 0 | Status: COMPLETED | OUTPATIENT
Start: 2018-12-13 | End: 2018-12-13

## 2018-12-13 RX ORDER — SODIUM CHLORIDE 9 MG/ML
1000 INJECTION INTRAMUSCULAR; INTRAVENOUS; SUBCUTANEOUS
Qty: 0 | Refills: 0 | Status: DISCONTINUED | OUTPATIENT
Start: 2018-12-13 | End: 2018-12-13

## 2018-12-13 RX ORDER — HYDROMORPHONE HYDROCHLORIDE 2 MG/ML
0.2 INJECTION INTRAMUSCULAR; INTRAVENOUS; SUBCUTANEOUS
Qty: 0 | Refills: 0 | Status: DISCONTINUED | OUTPATIENT
Start: 2018-12-13 | End: 2018-12-13

## 2018-12-13 RX ORDER — AMLODIPINE BESYLATE 2.5 MG/1
5 TABLET ORAL DAILY
Qty: 0 | Refills: 0 | Status: DISCONTINUED | OUTPATIENT
Start: 2018-12-13 | End: 2018-12-13

## 2018-12-13 RX ORDER — ATORVASTATIN CALCIUM 80 MG/1
10 TABLET, FILM COATED ORAL AT BEDTIME
Qty: 0 | Refills: 0 | Status: DISCONTINUED | OUTPATIENT
Start: 2018-12-14 | End: 2018-12-20

## 2018-12-13 RX ORDER — PANTOPRAZOLE SODIUM 20 MG/1
40 TABLET, DELAYED RELEASE ORAL
Qty: 0 | Refills: 0 | Status: DISCONTINUED | OUTPATIENT
Start: 2018-12-13 | End: 2018-12-20

## 2018-12-13 RX ORDER — DEXTROSE 50 % IN WATER 50 %
25 SYRINGE (ML) INTRAVENOUS ONCE
Qty: 0 | Refills: 0 | Status: DISCONTINUED | OUTPATIENT
Start: 2018-12-13 | End: 2018-12-13

## 2018-12-13 RX ORDER — INSULIN LISPRO 100/ML
VIAL (ML) SUBCUTANEOUS
Qty: 0 | Refills: 0 | Status: DISCONTINUED | OUTPATIENT
Start: 2018-12-13 | End: 2018-12-13

## 2018-12-13 RX ORDER — ACETAMINOPHEN 500 MG
1000 TABLET ORAL ONCE
Qty: 0 | Refills: 0 | Status: DISCONTINUED | OUTPATIENT
Start: 2018-12-13 | End: 2018-12-20

## 2018-12-13 RX ORDER — OXYCODONE HYDROCHLORIDE 5 MG/1
5 TABLET ORAL EVERY 4 HOURS
Qty: 0 | Refills: 0 | Status: DISCONTINUED | OUTPATIENT
Start: 2018-12-13 | End: 2018-12-17

## 2018-12-13 RX ORDER — SENNA PLUS 8.6 MG/1
2 TABLET ORAL AT BEDTIME
Qty: 0 | Refills: 0 | Status: DISCONTINUED | OUTPATIENT
Start: 2018-12-13 | End: 2018-12-20

## 2018-12-13 RX ORDER — DEXTROSE 50 % IN WATER 50 %
15 SYRINGE (ML) INTRAVENOUS ONCE
Qty: 0 | Refills: 0 | Status: DISCONTINUED | OUTPATIENT
Start: 2018-12-13 | End: 2018-12-20

## 2018-12-13 RX ORDER — DEXTROSE 50 % IN WATER 50 %
12.5 SYRINGE (ML) INTRAVENOUS ONCE
Qty: 0 | Refills: 0 | Status: DISCONTINUED | OUTPATIENT
Start: 2018-12-13 | End: 2018-12-20

## 2018-12-13 RX ORDER — GLUCAGON INJECTION, SOLUTION 0.5 MG/.1ML
1 INJECTION, SOLUTION SUBCUTANEOUS ONCE
Qty: 0 | Refills: 0 | Status: DISCONTINUED | OUTPATIENT
Start: 2018-12-13 | End: 2018-12-20

## 2018-12-13 RX ORDER — DOCUSATE SODIUM 100 MG
100 CAPSULE ORAL THREE TIMES A DAY
Qty: 0 | Refills: 0 | Status: DISCONTINUED | OUTPATIENT
Start: 2018-12-13 | End: 2018-12-20

## 2018-12-13 RX ORDER — HEPARIN SODIUM 5000 [USP'U]/ML
5000 INJECTION INTRAVENOUS; SUBCUTANEOUS EVERY 8 HOURS
Qty: 0 | Refills: 0 | Status: DISCONTINUED | OUTPATIENT
Start: 2018-12-13 | End: 2018-12-13

## 2018-12-13 RX ORDER — POLYETHYLENE GLYCOL 3350 17 G/17G
17 POWDER, FOR SOLUTION ORAL DAILY
Qty: 0 | Refills: 0 | Status: DISCONTINUED | OUTPATIENT
Start: 2018-12-13 | End: 2018-12-20

## 2018-12-13 RX ORDER — MORPHINE SULFATE 50 MG/1
2 CAPSULE, EXTENDED RELEASE ORAL EVERY 4 HOURS
Qty: 0 | Refills: 0 | Status: DISCONTINUED | OUTPATIENT
Start: 2018-12-13 | End: 2018-12-13

## 2018-12-13 RX ORDER — SODIUM CHLORIDE 9 MG/ML
1000 INJECTION, SOLUTION INTRAVENOUS
Qty: 0 | Refills: 0 | Status: DISCONTINUED | OUTPATIENT
Start: 2018-12-13 | End: 2018-12-20

## 2018-12-13 RX ORDER — DEXTROSE 50 % IN WATER 50 %
15 SYRINGE (ML) INTRAVENOUS ONCE
Qty: 0 | Refills: 0 | Status: DISCONTINUED | OUTPATIENT
Start: 2018-12-13 | End: 2018-12-13

## 2018-12-13 RX ADMIN — SODIUM CHLORIDE 3 MILLILITER(S): 9 INJECTION INTRAMUSCULAR; INTRAVENOUS; SUBCUTANEOUS at 10:00

## 2018-12-13 RX ADMIN — CHLORHEXIDINE GLUCONATE 1 APPLICATION(S): 213 SOLUTION TOPICAL at 16:00

## 2018-12-13 RX ADMIN — MORPHINE SULFATE 2 MILLIGRAM(S): 50 CAPSULE, EXTENDED RELEASE ORAL at 11:39

## 2018-12-13 RX ADMIN — MORPHINE SULFATE 2 MILLIGRAM(S): 50 CAPSULE, EXTENDED RELEASE ORAL at 11:09

## 2018-12-13 RX ADMIN — MORPHINE SULFATE 2 MILLIGRAM(S): 50 CAPSULE, EXTENDED RELEASE ORAL at 14:10

## 2018-12-13 NOTE — H&P ADULT - ASSESSMENT
Pt is a 86 y o F from home PMH HTN, DMII, HLD, presented with fall admitted for acute left hip fracture.    #Acute left hip fracture: pt can proceed to OR without further cardiac testing, denies history of CAD, no stents, tom shave renal disease, notified PCP to obtain baseline Cr which he states is 1.58    #PASTORA on CKD III: baseline Cr is 1.58, will hydrate, check PVR to r/o retention, hold ARB    #DMII: continue SSI, check HgA1C    #HTN: continue Norvasc, hold ARB    #DVT PPX: Heparin    CAPRINI SCORE [CLOT]    AGE RELATED RISK FACTORS                                                       MOBILITY RELATED FACTORS  [ ] Age 41-60 years                                            (1 Point)                  [ ] Bed rest                                                        (1 Point)  [ ] Age: 61-74 years                                           (2 Points)                 [ ] Plaster cast                                                   (2 Points)  [x ] Age= 75 years                                              (3 Points)                 [ ] Bed bound for more than 72 hours                 (2 Points)    DISEASE RELATED RISK FACTORS                                               GENDER SPECIFIC FACTORS  [ ] Edema in the lower extremities                       (1 Point)                  [ ] Pregnancy                                                     (1 Point)  [ ] Varicose veins                                               (1 Point)                  [ ] Post-partum < 6 weeks                                   (1 Point)             [ ] BMI > 25 Kg/m2                                            (1 Point)                  [ ] Hormonal therapy  or oral contraception          (1 Point)                 [ ] Sepsis (in the previous month)                        (1 Point)                  [ ] History of pregnancy complications                 (1 point)  [ ] Pneumonia or serious lung disease                                               [ ] Unexplained or recurrent                     (1 Point)           (in the previous month)                               (1 Point)  [ ] Abnormal pulmonary function test                     (1 Point)                 SURGERY RELATED RISK FACTORS  [ ] Acute myocardial infarction                              (1 Point)                 [ ]  Section                                             (1 Point)  [ ] Congestive heart failure (in the previous month)  (1 Point)               [ ] Minor surgery                                                  (1 Point)   [ ] Inflammatory bowel disease                             (1 Point)                 [ ] Arthroscopic surgery                                        (2 Points)  [ ] Central venous access                                      (2 Points)                [ ] General surgery lasting more than 45 minutes   (2 Points)       [ ] Stroke (in the previous month)                          (5 Points)               [ ] Elective arthroplasty                                         (5 Points)                                                                                                                                               HEMATOLOGY RELATED FACTORS                                                 TRAUMA RELATED RISK FACTORS  [ ] Prior episodes of VTE                                     (3 Points)                 [x ] Fracture of the hip, pelvis, or leg                       (5 Points)  [ ] Positive family history for VTE                         (3 Points)                 [ ] Acute spinal cord injury (in the previous month)  (5 Points)  [ ] Prothrombin 14935 A                                     (3 Points)                 [ ] Paralysis  (less than 1 month)                             (5 Points)  [ ] Factor V Leiden                                             (3 Points)                  [ ] Multiple Trauma within 1 month                        (5 Points)  [ ] Lupus anticoagulants                                     (3 Points)                                                           [ ] Anticardiolipin antibodies                               (3 Points)                                                       [ ] High homocysteine in the blood                      (3 Points)                                             [ ] Other congenital or acquired thrombophilia      (3 Points)                                                [ ] Heparin induced thrombocytopenia                  (3 Points)                                          Total Score [    7      ] Pt is a 86 y o F from home PMH HTN, DMII, HLD, presented with fall admitted for acute left hip fracture.    #Acute left hip fracture: pt can proceed to OR without further cardiac testing, denies history of CAD, no stents, tom shave renal disease, notified PCP to obtain baseline Cr which he states is 1.58    #PASTORA on CKD III: baseline Cr is 1.58, will hydrate, check PVR to r/o retention, hold ARB    #Anterior mediastinal rim enhancing mass: appears stable from 2016, consulted [facility], needs outpt f/u at discharge, not causing any acute issues currently     #DMII: continue SSI, check HgA1C    #HTN: continue Norvasc, hold ARB    #DVT PPX: Heparin    CAPRINI SCORE [CLOT]    AGE RELATED RISK FACTORS                                                       MOBILITY RELATED FACTORS  [ ] Age 41-60 years                                            (1 Point)                  [ ] Bed rest                                                        (1 Point)  [ ] Age: 61-74 years                                           (2 Points)                 [ ] Plaster cast                                                   (2 Points)  [x ] Age= 75 years                                              (3 Points)                 [ ] Bed bound for more than 72 hours                 (2 Points)    DISEASE RELATED RISK FACTORS                                               GENDER SPECIFIC FACTORS  [ ] Edema in the lower extremities                       (1 Point)                  [ ] Pregnancy                                                     (1 Point)  [ ] Varicose veins                                               (1 Point)                  [ ] Post-partum < 6 weeks                                   (1 Point)             [ ] BMI > 25 Kg/m2                                            (1 Point)                  [ ] Hormonal therapy  or oral contraception          (1 Point)                 [ ] Sepsis (in the previous month)                        (1 Point)                  [ ] History of pregnancy complications                 (1 point)  [ ] Pneumonia or serious lung disease                                               [ ] Unexplained or recurrent                     (1 Point)           (in the previous month)                               (1 Point)  [ ] Abnormal pulmonary function test                     (1 Point)                 SURGERY RELATED RISK FACTORS  [ ] Acute myocardial infarction                              (1 Point)                 [ ]  Section                                             (1 Point)  [ ] Congestive heart failure (in the previous month)  (1 Point)               [ ] Minor surgery                                                  (1 Point)   [ ] Inflammatory bowel disease                             (1 Point)                 [ ] Arthroscopic surgery                                        (2 Points)  [ ] Central venous access                                      (2 Points)                [ ] General surgery lasting more than 45 minutes   (2 Points)       [ ] Stroke (in the previous month)                          (5 Points)               [ ] Elective arthroplasty                                         (5 Points)                                                                                                                                               HEMATOLOGY RELATED FACTORS                                                 TRAUMA RELATED RISK FACTORS  [ ] Prior episodes of VTE                                     (3 Points)                 [x ] Fracture of the hip, pelvis, or leg                       (5 Points)  [ ] Positive family history for VTE                         (3 Points)                 [ ] Acute spinal cord injury (in the previous month)  (5 Points)  [ ] Prothrombin 88933 A                                     (3 Points)                 [ ] Paralysis  (less than 1 month)                             (5 Points)  [ ] Factor V Leiden                                             (3 Points)                  [ ] Multiple Trauma within 1 month                        (5 Points)  [ ] Lupus anticoagulants                                     (3 Points)                                                           [ ] Anticardiolipin antibodies                               (3 Points)                                                       [ ] High homocysteine in the blood                      (3 Points)                                             [ ] Other congenital or acquired thrombophilia      (3 Points)                                                [ ] Heparin induced thrombocytopenia                  (3 Points)                                          Total Score [    7      ]

## 2018-12-13 NOTE — BRIEF OPERATIVE NOTE - PROCEDURE
<<-----Click on this checkbox to enter Procedure Hemiarthroplasty of hip  12/13/2018  Left hip hemiarthroplasy  Active  [id number]

## 2018-12-13 NOTE — H&P ADULT - NSHPREVIEWOFSYSTEMS_GEN_ALL_CORE
REVIEW OF SYSTEMS:  CONSTITUTIONAL: No fever, weight loss, or fatigue  EYES: No eye pain, visual disturbances, or discharge  ENMT:  No difficulty hearing, tinnitus, vertigo; No sinus or throat pain  NECK: No neck pain or neck stiffness  RESPIRATORY: No cough, wheezing, chills or hemoptysis; No shortness of breath  CARDIOVASCULAR: No chest pain, palpitations, dizziness, or leg swelling  GASTROINTESTINAL: No abdominal pain, No nausea, vomiting, or hematemesis; No diarrhea or constipation  GENITOURINARY: No dysuria, frequency, hematuria, or incontinence  NEUROLOGICAL: No headaches, memory loss, loss of strength, numbness, or tremors  SKIN: No itching, burning, rashes, or lesions   ENDOCRINE: No heat or cold intolerance; No hair loss  MUSCULOSKELETAL: Left hip pain  PSYCHIATRIC: No depression, anxiety, mood swings, or difficulty sleeping  HEME/LYMPH: No easy bruising or bleeding  ALLERY AND IMMUNOLOGIC: No hives or eczema    All other ROS reviewed and negative except as otherwise stated

## 2018-12-13 NOTE — ED PROVIDER NOTE - MEDICAL DECISION MAKING DETAILS
pt with a mechanical fall sustained a left subcap fx, pt with a hx of dvt in the past with a knee injury case discussed with Dr. Brenner and Dr. Gannon

## 2018-12-13 NOTE — H&P ADULT - NSHPLABSRESULTS_GEN_ALL_CORE
11.7   12.2  )-----------( 181      ( 13 Dec 2018 10:00 )             34.9     12-13    142  |  110<H>  |  34<H>  ----------------------------<  182<H>  4.7   |  18<L>  |  2.19<H>    Ca    9.8      13 Dec 2018 10:00    TPro  7.3  /  Alb  3.5  /  TBili  0.4  /  DBili  x   /  AST  24  /  ALT  24  /  AlkPhos  39<L>  12-13    < from: Xray Hip w/ Pelvis 2 or 3 Views, Left (12.13.18 @ 10:38) >    EXAM:  XR HIP WITH PELV 2-3V LT      PROCEDURE DATE:  12/13/2018        INTERPRETATION:  Left hip with full pelvic view. Patient has local pain   after a fall. 3 images obtained.    There is a high neck fracture of the left hip with typical upper   placement of the distal fracture fragment.    Hips are relatively free of degeneration.    There is lower lumbar degenerative loss of disc height on the left at   L3-4.    Arterial calcifications are noted.    IMPRESSION: Left hip fracture.    < end of copied text >    < from: Xray Chest 1 View AP/PA (12.13.18 @ 10:17) >    EXAM:  XR CHEST AP OR PA 1V      PROCEDURE DATE:  12/13/2018        INTERPRETATION:  AP erect chest on December 13, 2018 at 10:01 AM. Patient   had a fall. The patient's chin obscures the apices.    Heart is normal for projection. Minimal linear densities at left base   noted.    Rim calcified structure over the right hilum measuring approximately 6.3   cm again noted. This is shown on CAT scan to be within the chest.    Chest x-ray is similar to November 23, 2016.    IMPRESSION: Unchanged chest as above showing no acute finding.      < end of copied text >

## 2018-12-13 NOTE — H&P ADULT - NSHPPHYSICALEXAM_GEN_ALL_CORE
T(C): 36.4 (12-13-18 @ 09:18), Max: 36.4 (12-13-18 @ 09:18)  HR: 93 (12-13-18 @ 09:18) (93 - 93)  BP: 160/82 (12-13-18 @ 09:18) (160/82 - 160/82)  RR: 16 (12-13-18 @ 09:18) (16 - 16)  SpO2: 98% (12-13-18 @ 09:18) (98% - 98%)    PHYSICAL EXAM:  GENERAL: NAD, well-groomed, well-developed  HEAD:  Atraumatic, Normocephalic  EYES: EOMI, PERRLA, conjunctiva and sclera clear  ENMT: Moist mucous membranes, Good dentition  NECK: Supple, No JVD  NERVOUS SYSTEM:  A/O x3, Good concentration; CN 2-12 intact, No focal deficits  CHEST/LUNG: Clear to auscultation bilaterally; No rales, rhonchi, wheezing, or rubs  HEART: Regular rate and rhythm; S1/S2, No murmurs, rubs, or gallops  ABDOMEN: Soft, Nontender, Nondistended; Bowel sounds present  VASCULAR: Normal pulses, Normal capillary refill  EXTREMITIES:  2+ Peripheral Pulses, No clubbing, cyanosis, or edema  SKIN: Warm, Intact  PSYCH: Normal mood and affect

## 2018-12-13 NOTE — ED ADULT NURSE NOTE - NSIMPLEMENTINTERV_GEN_ALL_ED
Implemented All Fall Risk Interventions:  [place] to call system. Call bell, personal items and telephone within reach. Instruct patient to call for assistance. Room bathroom lighting operational. Non-slip footwear when patient is off stretcher. Physically safe environment: no spills, clutter or unnecessary equipment. Stretcher in lowest position, wheels locked, appropriate side rails in place. Provide visual cue, wrist band, yellow gown, etc. Monitor gait and stability. Monitor for mental status changes and reorient to person, place, and time. Review medications for side effects contributing to fall risk. Reinforce activity limits and safety measures with patient and family.

## 2018-12-13 NOTE — ED ADULT NURSE NOTE - OBJECTIVE STATEMENT
Pt c/o left upper thigh pain s/p fall at 0330 last night after tripping on the bathroom rug. Per son he heard her call for him and then saw her on the bathroom floor. Pt denies dizziness, hitting head, or LOC. Pt presents with a shortened, and externally rotated left leg. Pt son pt was able to get into bed with assistance last night but was not able to bear weight this morning. Pt has warm left foot with 2+ pedal pulses bilaterally.

## 2018-12-13 NOTE — ED PROVIDER NOTE - OBJECTIVE STATEMENT
86 year old had a mechanical fall in the bathroom and hurt her left hip. Pt denies dizziness or loc. Pt denies chest pain or shortness of breath.

## 2018-12-13 NOTE — H&P ADULT - PMH
HTN (hypertension)    Pure hypercholesterolemia    Type 2 diabetes mellitus without complication, without long-term current use of insulin

## 2018-12-13 NOTE — H&P ADULT - HISTORY OF PRESENT ILLNESS
Pt is a 86 y o F from home PMH DMII, HTN, HLD presented with fall, pain in left hip. Pt denies chest pain, no palpitations, no sob, able to do light house work with no difficulty, can go up stairs with no HOLLIDAY. Son is at bedside with pt.

## 2018-12-14 LAB
ANION GAP SERPL CALC-SCNC: 14 MMOL/L — SIGNIFICANT CHANGE UP (ref 5–17)
BASOPHILS # BLD AUTO: 0.1 K/UL — SIGNIFICANT CHANGE UP (ref 0–0.2)
BASOPHILS NFR BLD AUTO: 0.8 % — SIGNIFICANT CHANGE UP (ref 0–2)
BUN SERPL-MCNC: 34 MG/DL — HIGH (ref 7–23)
CALCIUM SERPL-MCNC: 8.6 MG/DL — SIGNIFICANT CHANGE UP (ref 8.4–10.5)
CHLORIDE SERPL-SCNC: 110 MMOL/L — HIGH (ref 96–108)
CO2 SERPL-SCNC: 18 MMOL/L — LOW (ref 22–31)
CREAT SERPL-MCNC: 2.1 MG/DL — HIGH (ref 0.5–1.3)
EOSINOPHIL # BLD AUTO: 0.2 K/UL — SIGNIFICANT CHANGE UP (ref 0–0.5)
EOSINOPHIL NFR BLD AUTO: 2.3 % — SIGNIFICANT CHANGE UP (ref 0–6)
GLUCOSE BLDC GLUCOMTR-MCNC: 121 MG/DL — HIGH (ref 70–99)
GLUCOSE BLDC GLUCOMTR-MCNC: 150 MG/DL — HIGH (ref 70–99)
GLUCOSE BLDC GLUCOMTR-MCNC: 184 MG/DL — HIGH (ref 70–99)
GLUCOSE BLDC GLUCOMTR-MCNC: 187 MG/DL — HIGH (ref 70–99)
GLUCOSE SERPL-MCNC: 143 MG/DL — HIGH (ref 70–99)
HBA1C BLD-MCNC: 6 % — HIGH (ref 4–5.6)
HCT VFR BLD CALC: 27.2 % — LOW (ref 34.5–45)
HGB BLD-MCNC: 9.1 G/DL — LOW (ref 11.5–15.5)
LYMPHOCYTES # BLD AUTO: 1 K/UL — SIGNIFICANT CHANGE UP (ref 1–3.3)
LYMPHOCYTES # BLD AUTO: 10.4 % — LOW (ref 13–44)
MCHC RBC-ENTMCNC: 31.5 PG — SIGNIFICANT CHANGE UP (ref 27–34)
MCHC RBC-ENTMCNC: 33.4 GM/DL — SIGNIFICANT CHANGE UP (ref 32–36)
MCV RBC AUTO: 94.3 FL — SIGNIFICANT CHANGE UP (ref 80–100)
MONOCYTES # BLD AUTO: 0.6 K/UL — SIGNIFICANT CHANGE UP (ref 0–0.9)
MONOCYTES NFR BLD AUTO: 6.6 % — SIGNIFICANT CHANGE UP (ref 1–9)
NEUTROPHILS # BLD AUTO: 7.8 K/UL — HIGH (ref 1.8–7.4)
NEUTROPHILS NFR BLD AUTO: 79.8 % — HIGH (ref 43–77)
PLATELET # BLD AUTO: 149 K/UL — LOW (ref 150–400)
POTASSIUM SERPL-MCNC: 4.2 MMOL/L — SIGNIFICANT CHANGE UP (ref 3.5–5.3)
POTASSIUM SERPL-SCNC: 4.2 MMOL/L — SIGNIFICANT CHANGE UP (ref 3.5–5.3)
RBC # BLD: 2.88 M/UL — LOW (ref 3.8–5.2)
RBC # FLD: 12.9 % — SIGNIFICANT CHANGE UP (ref 10.3–14.5)
SODIUM SERPL-SCNC: 142 MMOL/L — SIGNIFICANT CHANGE UP (ref 135–145)
WBC # BLD: 9.7 K/UL — SIGNIFICANT CHANGE UP (ref 3.8–10.5)
WBC # FLD AUTO: 9.7 K/UL — SIGNIFICANT CHANGE UP (ref 3.8–10.5)

## 2018-12-14 PROCEDURE — 99221 1ST HOSP IP/OBS SF/LOW 40: CPT

## 2018-12-14 PROCEDURE — 76775 US EXAM ABDO BACK WALL LIM: CPT | Mod: 26

## 2018-12-14 PROCEDURE — 99233 SBSQ HOSP IP/OBS HIGH 50: CPT

## 2018-12-14 PROCEDURE — 76770 US EXAM ABDO BACK WALL COMP: CPT | Mod: 26

## 2018-12-14 RX ADMIN — HYDROMORPHONE HYDROCHLORIDE 0.5 MILLIGRAM(S): 2 INJECTION INTRAMUSCULAR; INTRAVENOUS; SUBCUTANEOUS at 09:00

## 2018-12-14 RX ADMIN — Medication 0.25 MILLIGRAM(S): at 12:12

## 2018-12-14 RX ADMIN — Medication 100 MILLIGRAM(S): at 05:19

## 2018-12-14 RX ADMIN — OXYCODONE HYDROCHLORIDE 5 MILLIGRAM(S): 5 TABLET ORAL at 01:06

## 2018-12-14 RX ADMIN — PANTOPRAZOLE SODIUM 40 MILLIGRAM(S): 20 TABLET, DELAYED RELEASE ORAL at 05:19

## 2018-12-14 RX ADMIN — SODIUM CHLORIDE 100 MILLILITER(S): 9 INJECTION, SOLUTION INTRAVENOUS at 07:59

## 2018-12-14 RX ADMIN — OXYCODONE HYDROCHLORIDE 5 MILLIGRAM(S): 5 TABLET ORAL at 06:23

## 2018-12-14 RX ADMIN — Medication 100 MILLIGRAM(S): at 21:00

## 2018-12-14 RX ADMIN — SODIUM CHLORIDE 100 MILLILITER(S): 9 INJECTION, SOLUTION INTRAVENOUS at 00:06

## 2018-12-14 RX ADMIN — Medication 1: at 17:14

## 2018-12-14 RX ADMIN — OXYCODONE HYDROCHLORIDE 5 MILLIGRAM(S): 5 TABLET ORAL at 05:23

## 2018-12-14 RX ADMIN — Medication 1: at 12:11

## 2018-12-14 RX ADMIN — AMLODIPINE BESYLATE 5 MILLIGRAM(S): 2.5 TABLET ORAL at 05:19

## 2018-12-14 RX ADMIN — Medication 100 MILLIGRAM(S): at 17:06

## 2018-12-14 RX ADMIN — HYDROMORPHONE HYDROCHLORIDE 0.5 MILLIGRAM(S): 2 INJECTION INTRAMUSCULAR; INTRAVENOUS; SUBCUTANEOUS at 08:45

## 2018-12-14 RX ADMIN — Medication 100 MILLIGRAM(S): at 00:02

## 2018-12-14 RX ADMIN — Medication 100 MILLIGRAM(S): at 08:41

## 2018-12-14 RX ADMIN — POLYETHYLENE GLYCOL 3350 17 GRAM(S): 17 POWDER, FOR SOLUTION ORAL at 12:12

## 2018-12-14 RX ADMIN — ATORVASTATIN CALCIUM 10 MILLIGRAM(S): 80 TABLET, FILM COATED ORAL at 21:00

## 2018-12-14 RX ADMIN — SODIUM CHLORIDE 100 MILLILITER(S): 9 INJECTION, SOLUTION INTRAVENOUS at 21:00

## 2018-12-14 RX ADMIN — ONDANSETRON 4 MILLIGRAM(S): 8 TABLET, FILM COATED ORAL at 12:55

## 2018-12-14 RX ADMIN — Medication 100 MILLIGRAM(S): at 00:09

## 2018-12-14 RX ADMIN — OXYCODONE HYDROCHLORIDE 5 MILLIGRAM(S): 5 TABLET ORAL at 00:06

## 2018-12-14 NOTE — PHYSICAL THERAPY INITIAL EVALUATION ADULT - ADDITIONAL COMMENTS
pt lives in private house w/ and son, 3 ALANA w/hr and 1 flight to reach br. pt ambulates, and is independent w/adl's

## 2018-12-14 NOTE — PROGRESS NOTE ADULT - SUBJECTIVE AND OBJECTIVE BOX
Patient is a 86y old  Female who presents with a chief complaint of Fall, left hip pain (13 Dec 2018 12:44)      Patient seen and examined at bedside. feels well, no complaints.     ALLERGIES:  penicillin (Rash; Urticaria)    MEDICATIONS:  ALPRAZolam 0.25 milliGRAM(s) Oral daily PRN  amLODIPine   Tablet 5 milliGRAM(s) Oral daily  atorvastatin 10 milliGRAM(s) Oral at bedtime    Vital Signs Last 24 Hrs  T(F): 99 (14 Dec 2018 07:55), Max: 99.9 (14 Dec 2018 05:06)  HR: 97 (14 Dec 2018 07:55) (85 - 99)  BP: 115/54 (14 Dec 2018 07:55) (98/63 - 160/82)  RR: 16 (14 Dec 2018 07:55) (16 - 16)  SpO2: 92% (14 Dec 2018 07:55) (92% - 100%)  I&O's Summary    13 Dec 2018 07:01  -  14 Dec 2018 07:00  --------------------------------------------------------  IN: 1100 mL / OUT: 280 mL / NET: 820 mL        PHYSICAL EXAM:  General: NAD, alert oriented x 3  Neck: Supple, No JVD  Lungs: Clear to auscultation bilaterally  Cardio: RRR, S1/S2, No murmurs  Abdomen: Soft, Nontender, Nondistended; Bowel sounds present  Extremities: No clubbing, cyanosis, or edema  left hip wound dressing intact, mild tenderness at side.       LABS:                        9.1    9.7   )-----------( 149      ( 14 Dec 2018 06:00 )             27.2     12-14    142  |  110  |  34  ----------------------------<  143  4.2   |  18  |  2.10    Ca    8.6      14 Dec 2018 06:00    TPro  7.3  /  Alb  3.5  /  TBili  0.4  /  DBili  x   /  AST  24  /  ALT  24  /  AlkPhos  39  12-13    eGFR if : 24 mL/min/1.73M2 (12-14-18 @ 06:00)  eGFR if Non African American: 21 mL/min/1.73M2 (12-14-18 @ 06:00)    PT/INR - ( 13 Dec 2018 10:00 )   PT: 11.3 sec;   INR: 1.01 ratio         PTT - ( 13 Dec 2018 10:00 )  PTT:28.8 sec    CAPILLARY BLOOD GLUCOSE    POCT Blood Glucose.: 150 mg/dL (14 Dec 2018 07:50)  POCT Blood Glucose.: 159 mg/dL (13 Dec 2018 23:19)  POCT Blood Glucose.: 115 mg/dL (13 Dec 2018 13:47)    RADIOLOGY & ADDITIONAL TESTS:    Care Discussed with Consultants/Other Providers:

## 2018-12-14 NOTE — PROGRESS NOTE ADULT - SUBJECTIVE AND OBJECTIVE BOX
Surgery PA Note:  POD#1  S/P Left Hip Hemiarthroplasty     Patient sitting up in bed having breakfast.  Patient c/o incisional /surgical discomfort 7/10.  Patient appears anxious.  The family states that she does suffer from anxiety.    ICU Vital Signs Last 24 Hrs  T(C): 37.2 (14 Dec 2018 07:55), Max: 37.7 (14 Dec 2018 05:06)  T(F): 99 (14 Dec 2018 07:55), Max: 99.9 (14 Dec 2018 05:06)  HR: 97 (14 Dec 2018 07:55) (85 - 99)  BP: 115/54 (14 Dec 2018 07:55) (98/63 - 160/82)  BP(mean): --  ABP: --  ABP(mean): --  RR: 16 (14 Dec 2018 07:55) (16 - 16)  SpO2: 92% (14 Dec 2018 07:55) (92% - 100%)    PE:    Alert and oriented x 3 .  Patient is Guyanese speaking but does understand and can answer in English   Lungs:  CTA   Cor:  S1S2  Abd:  soft, non tender +BS tolerating a diet  العلي discontinued  Ext:  Left Hip dressing clean/dry /intact thigh soft          + neurovascular intact   SCD,s bilateral                           9.1    9.7   )-----------( 149      ( 14 Dec 2018 06:00 )             27.2   12-14    142  |  110<H>  |  34<H>  ----------------------------<  143<H>  4.2   |  18<L>  |  2.10<H>    Ca    8.6      14 Dec 2018 06:00    TPro  7.3  /  Alb  3.5  /  TBili  0.4  /  DBili  x   /  AST  24  /  ALT  24  /  AlkPhos  39<L>  12-13

## 2018-12-14 NOTE — CONSULT NOTE ADULT - ASSESSMENT
86 woman s/p left hip fracture treated with hemiarthroplasty on 12/13.   We will follow to determine her rehab needs as she is presently unable to participate in mobility assessment.

## 2018-12-14 NOTE — PROGRESS NOTE ADULT - ASSESSMENT
Pt is a 86 y o F from home PMH HTN, DMII, HLD, presented with fall admitted for acute left hip fracture.    #Acute left hip fracture, s/p left hemiarthroplasty.     #post op anemia- monitor H/H     #PASTORA on CKD III: baseline Cr is 1.58, continue iv fluids, check renal sono. hold ARB, hold metformin     #Anterior mediastinal rim enhancing mass: appears stable from 2016, consulted [facility], needs outpt f/u at discharge, not causing any acute issues currently     #DMII: continue SSI, check HgA1C (6.5 in september 2018)     #HTN: continue Norvasc, hold ARB    #DVT PPX: Heparin    PT evaluation, PM&R consult

## 2018-12-14 NOTE — CONSULT NOTE ADULT - SUBJECTIVE AND OBJECTIVE BOX
Mrs. More is an 86 year old woman who fell yesterday evening, noting left hip pain and presenting to [facility]. She was found to have a left hip fracture for which she underwent a hemiarthroplasty by Dr. Brenner. To date, course notable for fatigue and nausea. Hip precautions in place. DVT prophylaxis begun. Asked to determine rehab needs.     She lives in a private home with family members including children with steps.   She did not tolerate PT earlier today.     On exam, she is awake, alert and pleasant.  Pain is noted with activation of the left hip.   She can move ankles normally.   Reports she is unable to attempt bed mobility and sitting up.    She will require reevaluation in the coming days as needs not able to be determined presently.

## 2018-12-15 DIAGNOSIS — N18.9 CHRONIC KIDNEY DISEASE, UNSPECIFIED: ICD-10-CM

## 2018-12-15 DIAGNOSIS — E11.9 TYPE 2 DIABETES MELLITUS WITHOUT COMPLICATIONS: ICD-10-CM

## 2018-12-15 DIAGNOSIS — D69.6 THROMBOCYTOPENIA, UNSPECIFIED: ICD-10-CM

## 2018-12-15 DIAGNOSIS — I10 ESSENTIAL (PRIMARY) HYPERTENSION: ICD-10-CM

## 2018-12-15 DIAGNOSIS — E78.00 PURE HYPERCHOLESTEROLEMIA, UNSPECIFIED: ICD-10-CM

## 2018-12-15 DIAGNOSIS — J98.59 OTHER DISEASES OF MEDIASTINUM, NOT ELSEWHERE CLASSIFIED: ICD-10-CM

## 2018-12-15 DIAGNOSIS — N18.4 CHRONIC KIDNEY DISEASE, STAGE 4 (SEVERE): ICD-10-CM

## 2018-12-15 DIAGNOSIS — S72.009A FRACTURE OF UNSPECIFIED PART OF NECK OF UNSPECIFIED FEMUR, INITIAL ENCOUNTER FOR CLOSED FRACTURE: ICD-10-CM

## 2018-12-15 DIAGNOSIS — D50.0 IRON DEFICIENCY ANEMIA SECONDARY TO BLOOD LOSS (CHRONIC): ICD-10-CM

## 2018-12-15 LAB
ANION GAP SERPL CALC-SCNC: 11 MMOL/L — SIGNIFICANT CHANGE UP (ref 5–17)
BUN SERPL-MCNC: 36 MG/DL — HIGH (ref 7–23)
CALCIUM SERPL-MCNC: 8.4 MG/DL — SIGNIFICANT CHANGE UP (ref 8.4–10.5)
CHLORIDE SERPL-SCNC: 109 MMOL/L — HIGH (ref 96–108)
CO2 SERPL-SCNC: 21 MMOL/L — LOW (ref 22–31)
CREAT SERPL-MCNC: 2.03 MG/DL — HIGH (ref 0.5–1.3)
GLUCOSE BLDC GLUCOMTR-MCNC: 141 MG/DL — HIGH (ref 70–99)
GLUCOSE BLDC GLUCOMTR-MCNC: 148 MG/DL — HIGH (ref 70–99)
GLUCOSE BLDC GLUCOMTR-MCNC: 170 MG/DL — HIGH (ref 70–99)
GLUCOSE BLDC GLUCOMTR-MCNC: 189 MG/DL — HIGH (ref 70–99)
GLUCOSE SERPL-MCNC: 142 MG/DL — HIGH (ref 70–99)
HBA1C BLD-MCNC: 6.1 % — HIGH (ref 4–5.6)
HCT VFR BLD CALC: 24.7 % — LOW (ref 34.5–45)
HGB BLD-MCNC: 8.3 G/DL — LOW (ref 11.5–15.5)
MCHC RBC-ENTMCNC: 31.7 PG — SIGNIFICANT CHANGE UP (ref 27–34)
MCHC RBC-ENTMCNC: 33.5 GM/DL — SIGNIFICANT CHANGE UP (ref 32–36)
MCV RBC AUTO: 94.7 FL — SIGNIFICANT CHANGE UP (ref 80–100)
PLATELET # BLD AUTO: 131 K/UL — LOW (ref 150–400)
POTASSIUM SERPL-MCNC: 4.2 MMOL/L — SIGNIFICANT CHANGE UP (ref 3.5–5.3)
POTASSIUM SERPL-SCNC: 4.2 MMOL/L — SIGNIFICANT CHANGE UP (ref 3.5–5.3)
RBC # BLD: 2.6 M/UL — LOW (ref 3.8–5.2)
RBC # FLD: 12.7 % — SIGNIFICANT CHANGE UP (ref 10.3–14.5)
SODIUM SERPL-SCNC: 141 MMOL/L — SIGNIFICANT CHANGE UP (ref 135–145)
WBC # BLD: 9 K/UL — SIGNIFICANT CHANGE UP (ref 3.8–10.5)
WBC # FLD AUTO: 9 K/UL — SIGNIFICANT CHANGE UP (ref 3.8–10.5)

## 2018-12-15 PROCEDURE — 99233 SBSQ HOSP IP/OBS HIGH 50: CPT

## 2018-12-15 PROCEDURE — 93970 EXTREMITY STUDY: CPT | Mod: 26

## 2018-12-15 RX ORDER — APIXABAN 2.5 MG/1
10 TABLET, FILM COATED ORAL EVERY 12 HOURS
Qty: 0 | Refills: 0 | Status: DISCONTINUED | OUTPATIENT
Start: 2018-12-15 | End: 2018-12-20

## 2018-12-15 RX ORDER — APIXABAN 2.5 MG/1
5 TABLET, FILM COATED ORAL EVERY 12 HOURS
Qty: 0 | Refills: 0 | Status: DISCONTINUED | OUTPATIENT
Start: 2018-12-21 | End: 2018-12-20

## 2018-12-15 RX ORDER — ACETAMINOPHEN 500 MG
650 TABLET ORAL EVERY 6 HOURS
Qty: 0 | Refills: 0 | Status: DISCONTINUED | OUTPATIENT
Start: 2018-12-15 | End: 2018-12-20

## 2018-12-15 RX ORDER — ENOXAPARIN SODIUM 100 MG/ML
30 INJECTION SUBCUTANEOUS DAILY
Qty: 0 | Refills: 0 | Status: DISCONTINUED | OUTPATIENT
Start: 2018-12-15 | End: 2018-12-15

## 2018-12-15 RX ADMIN — Medication 100 MILLIGRAM(S): at 21:16

## 2018-12-15 RX ADMIN — POLYETHYLENE GLYCOL 3350 17 GRAM(S): 17 POWDER, FOR SOLUTION ORAL at 12:20

## 2018-12-15 RX ADMIN — ATORVASTATIN CALCIUM 10 MILLIGRAM(S): 80 TABLET, FILM COATED ORAL at 21:16

## 2018-12-15 RX ADMIN — Medication 1: at 12:20

## 2018-12-15 RX ADMIN — ENOXAPARIN SODIUM 30 MILLIGRAM(S): 100 INJECTION SUBCUTANEOUS at 12:21

## 2018-12-15 RX ADMIN — AMLODIPINE BESYLATE 5 MILLIGRAM(S): 2.5 TABLET ORAL at 05:45

## 2018-12-15 RX ADMIN — Medication 650 MILLIGRAM(S): at 13:59

## 2018-12-15 RX ADMIN — Medication 650 MILLIGRAM(S): at 14:00

## 2018-12-15 RX ADMIN — PANTOPRAZOLE SODIUM 40 MILLIGRAM(S): 20 TABLET, DELAYED RELEASE ORAL at 05:45

## 2018-12-15 RX ADMIN — OXYCODONE HYDROCHLORIDE 5 MILLIGRAM(S): 5 TABLET ORAL at 11:00

## 2018-12-15 RX ADMIN — OXYCODONE HYDROCHLORIDE 5 MILLIGRAM(S): 5 TABLET ORAL at 10:40

## 2018-12-15 RX ADMIN — Medication 1: at 17:20

## 2018-12-15 RX ADMIN — APIXABAN 10 MILLIGRAM(S): 2.5 TABLET, FILM COATED ORAL at 17:20

## 2018-12-15 RX ADMIN — Medication 100 MILLIGRAM(S): at 05:45

## 2018-12-15 RX ADMIN — Medication 100 MILLIGRAM(S): at 12:21

## 2018-12-15 NOTE — PROGRESS NOTE ADULT - SUBJECTIVE AND OBJECTIVE BOX
Surgery PA Note POD#2  S/p Left hip hemiarthroplasty     Patient seen and examined by me. Patient c/o surgical pain (left) but also c/o pain   right calf and anterior shin.  Family by her side.     ICU Vital Signs Last 24 Hrs  T(C): 37.3 (15 Dec 2018 08:02), Max: 37.3 (15 Dec 2018 05:29)  T(F): 99.2 (15 Dec 2018 08:02), Max: 99.2 (15 Dec 2018 08:02)  HR: 104 (15 Dec 2018 11:35) (96 - 104)  BP: 125/51 (15 Dec 2018 13:08) (105/57 - 125/51)  BP(mean): --  ABP: --  ABP(mean): --  RR: 14 (15 Dec 2018 08:02) (14 - 16)  SpO2: 94% (15 Dec 2018 13:08) (92% - 95%)    PE:   Patient alert and oriented x2  Lungs:  CTA   Cor: S1S2  Abd:  soft, + BS -BM tolerating po intake but not very hungry  Left Hip Dressing clean and dry thigh soft , + neurovascular intact +EHL/FHL 4/5  Left calf soft non tender  right calf soft tender on palpation, + tenderness anterior portion of lower leg     ScD 's bilateral when in bed                           8.3    9.0   )-----------( 131      ( 15 Dec 2018 06:00 )             24.7   12-15    141  |  109<H>  |  36<H>  ----------------------------<  142<H>  4.2   |  21<L>  |  2.03<H>    Ca    8.4      15 Dec 2018 06:00

## 2018-12-15 NOTE — PROGRESS NOTE ADULT - PROBLEM SELECTOR PLAN 5
-Seen on CXR  - appears stable from 2016, consulted [facility], needs outpt f/u at discharge, not causing any acute issues currently -Hgb A1C 5.6 Sept  -Cont ISS  -Metformin on hold

## 2018-12-15 NOTE — OCCUPATIONAL THERAPY INITIAL EVALUATION ADULT - LIVES WITH, PROFILE
children/son and family with 1 ALANA from side of house, + 1 flight. Resides on main floor, +walk-in shower./spouse

## 2018-12-15 NOTE — PROGRESS NOTE ADULT - ASSESSMENT
Pt is a 86 y o F from home with PMH HTN, DMII, CKD stave IV. HLD, presented with fall admitted for acute left hip fracture. POD #2.

## 2018-12-15 NOTE — PROGRESS NOTE ADULT - PROBLEM SELECTOR PLAN 8
Cont statin    Dispo:  DVT ppx: Lovenox (renally dosed)  AVE vs acute rehab -most likely dilutional  -Cont to monitor

## 2018-12-15 NOTE — PROGRESS NOTE ADULT - ASSESSMENT
IMP:  anemia of acute blood loss           pain left calf           diabetes, htn     Plan:  Lovenox for Dvt prophylaxis           check cBC in am            PT OOB            Doppler lower extremities to r/o DVT

## 2018-12-15 NOTE — PROGRESS NOTE ADULT - PROBLEM SELECTOR PLAN 7
-most likely dilutional  -Cont to monitor controlled  -Cont Norvasc  -Cont to hold ARB due to PASTORA

## 2018-12-15 NOTE — PROGRESS NOTE ADULT - PROBLEM SELECTOR PLAN 4
-Hgb A1C 5.6 Sept  -Cont ISS  -Metformin on hold -ARB and Metformin on hold  Cont IVF--consider d/c if creat downtrending

## 2018-12-15 NOTE — PROGRESS NOTE ADULT - PROBLEM SELECTOR PLAN 3
Stage IV  PASTORA on CKD-Baseline creat 1.58  Renal sono done with right renal cyst  -ARB and Metformin on hold  Cont IVF--consider d/c if creat downtrending Stage IV  PASTORA on CKD-Baseline creat 1.58  Renal sono done with right renal cyst

## 2018-12-15 NOTE — PROGRESS NOTE ADULT - PROBLEM SELECTOR PLAN 6
controlled  -Cont Norvasc  -Cont to hold ARB due to PASTORA -Seen on CXR  - appears stable from 2016, consulted [facility], needs outpt f/u at discharge, not causing any acute issues currently

## 2018-12-15 NOTE — PROGRESS NOTE ADULT - PROBLEM SELECTOR PROBLEM 4
Type 2 diabetes mellitus without complication, without long-term current use of insulin Chronic kidney disease

## 2018-12-15 NOTE — PROGRESS NOTE ADULT - PROBLEM SELECTOR PROBLEM 5
Mediastinal mass Type 2 diabetes mellitus without complication, without long-term current use of insulin

## 2018-12-15 NOTE — OCCUPATIONAL THERAPY INITIAL EVALUATION ADULT - PLANNED THERAPY INTERVENTIONS, OT EVAL
ADL retraining/parent/caregiver training.../transfer training/IADL retraining/bed mobility training/strengthening/balance training/ROM

## 2018-12-15 NOTE — PROGRESS NOTE ADULT - SUBJECTIVE AND OBJECTIVE BOX
Patient is a 86y old  Female who presents with a chief complaint of Left hip fracture. (14 Dec 2018 15:55).  POD # 2.  No acute events overnight. Denies fevers, sweats, chills, chest pain, palps, shortness of breath.  Pain controlled.       Patient seen and examined at bedside.    ALLERGIES:  penicillin (Rash; Urticaria)    MEDICATIONS  (STANDING):  acetaminophen  IVPB .. 1000 milliGRAM(s) IV Intermittent once  amLODIPine   Tablet 5 milliGRAM(s) Oral daily  atorvastatin 10 milliGRAM(s) Oral at bedtime  dextrose 5%. 1000 milliLiter(s) (50 mL/Hr) IV Continuous <Continuous>  dextrose 50% Injectable 12.5 Gram(s) IV Push once  dextrose 50% Injectable 25 Gram(s) IV Push once  dextrose 50% Injectable 25 Gram(s) IV Push once  docusate sodium 100 milliGRAM(s) Oral three times a day  insulin lispro (HumaLOG) corrective regimen sliding scale   SubCutaneous three times a day before meals  lactated ringers. 1000 milliLiter(s) (100 mL/Hr) IV Continuous <Continuous>  pantoprazole    Tablet 40 milliGRAM(s) Oral before breakfast  polyethylene glycol 3350 17 Gram(s) Oral daily    MEDICATIONS  (PRN):  ALPRAZolam 0.25 milliGRAM(s) Oral daily PRN anxiety  aluminum hydroxide/magnesium hydroxide/simethicone Suspension 30 milliLiter(s) Oral four times a day PRN Indigestion  bisacodyl Suppository 10 milliGRAM(s) Rectal daily PRN If no bowel movement by POD#2  dextrose 40% Gel 15 Gram(s) Oral once PRN Blood Glucose LESS THAN 70 milliGRAM(s)/deciliter  glucagon  Injectable 1 milliGRAM(s) IntraMuscular once PRN Glucose LESS THAN 70 milligrams/deciliter  HYDROmorphone  Injectable 0.5 milliGRAM(s) IV Push every 3 hours PRN Severe Pain (7 - 10)  magnesium hydroxide Suspension 30 milliLiter(s) Oral at bedtime PRN Constipation  ondansetron Injectable 4 milliGRAM(s) IV Push every 6 hours PRN Nausea and/or Vomiting  oxyCODONE    IR 5 milliGRAM(s) Oral every 4 hours PRN Mild Pain (1 - 3)  oxyCODONE    IR 10 milliGRAM(s) Oral every 4 hours PRN Moderate Pain (4 - 6)  senna 2 Tablet(s) Oral at bedtime PRN Constipation    Vital Signs Last 24 Hrs  T(F): 99.2 (15 Dec 2018 08:02), Max: 99.2 (15 Dec 2018 08:02)  HR: 96 (15 Dec 2018 08:02) (96 - 103)  BP: 105/57 (15 Dec 2018 08:02) (105/57 - 107/50)  RR: 14 (15 Dec 2018 08:02) (14 - 16)  SpO2: 93% (15 Dec 2018 08:02) (92% - 94%)  I&O's Summary    14 Dec 2018 07:01  -  15 Dec 2018 07:00  --------------------------------------------------------  IN: 1300 mL / OUT: 0 mL / NET: 1300 mL      BMI (kg/m2): 27 (12-13-18 @ 16:22)  PHYSICAL EXAM:  General: NAD, A/O x 3  ENT: MMM  Neck: Supple, No JVD  Lungs: Clear to auscultation bilaterally  Cardio: RRR, S1/S2, Abdomen: Soft, Nontender, Nondistended; Bowel sounds present  Extremities: No calf tenderness, No pitting edema, Left Hip dressing clean/dry /intact thigh soft       LABS:                        8.3    9.0   )-----------( 131      ( 15 Dec 2018 06:00 )             24.7       12-15    141  |  109  |  36  ----------------------------<  142  4.2   |  21  |  2.03    Ca    8.4      15 Dec 2018 06:00    TPro  7.3  /  Alb  3.5  /  TBili  0.4  /  DBili  x   /  AST  24  /  ALT  24  /  AlkPhos  39  12-13     eGFR if Non African American: 22 mL/min/1.73M2 (12-15-18 @ 06:00)  eGFR if African American: 25 mL/min/1.73M2 (12-15-18 @ 06:00)    PT/INR - ( 13 Dec 2018 10:00 )   PT: 11.3 sec;   INR: 1.01 ratio         PTT - ( 13 Dec 2018 10:00 )  PTT:28.8 sec                 CAPILLARY BLOOD GLUCOSE      POCT Blood Glucose.: 141 mg/dL (15 Dec 2018 07:58)  POCT Blood Glucose.: 121 mg/dL (14 Dec 2018 21:03)  POCT Blood Glucose.: 187 mg/dL (14 Dec 2018 17:11)  POCT Blood Glucose.: 184 mg/dL (14 Dec 2018 12:10)    12-15 [id number] 6.1  12-14 [id number] 6.0          RADIOLOGY & ADDITIONAL TESTS:  Renal Sono: right renal cysts  Care Discussed with Consultants/Other Providers: Patient is a 86y old  Female who presents with a chief complaint of Left hip fracture. (14 Dec 2018 15:55).  POD # 2.  No acute events overnight. Denies fevers, sweats, chills, chest pain, palps, shortness of breath.  Pain controlled.     Patient seen and examined at bedside.    ALLERGIES:  penicillin (Rash; Urticaria)    MEDICATIONS  (STANDING):  acetaminophen  IVPB .. 1000 milliGRAM(s) IV Intermittent once  amLODIPine   Tablet 5 milliGRAM(s) Oral daily  atorvastatin 10 milliGRAM(s) Oral at bedtime  dextrose 5%. 1000 milliLiter(s) (50 mL/Hr) IV Continuous <Continuous>  dextrose 50% Injectable 12.5 Gram(s) IV Push once  dextrose 50% Injectable 25 Gram(s) IV Push once  dextrose 50% Injectable 25 Gram(s) IV Push once  docusate sodium 100 milliGRAM(s) Oral three times a day  insulin lispro (HumaLOG) corrective regimen sliding scale   SubCutaneous three times a day before meals  lactated ringers. 1000 milliLiter(s) (100 mL/Hr) IV Continuous <Continuous>  pantoprazole    Tablet 40 milliGRAM(s) Oral before breakfast  polyethylene glycol 3350 17 Gram(s) Oral daily    MEDICATIONS  (PRN):  ALPRAZolam 0.25 milliGRAM(s) Oral daily PRN anxiety  aluminum hydroxide/magnesium hydroxide/simethicone Suspension 30 milliLiter(s) Oral four times a day PRN Indigestion  bisacodyl Suppository 10 milliGRAM(s) Rectal daily PRN If no bowel movement by POD#2  dextrose 40% Gel 15 Gram(s) Oral once PRN Blood Glucose LESS THAN 70 milliGRAM(s)/deciliter  glucagon  Injectable 1 milliGRAM(s) IntraMuscular once PRN Glucose LESS THAN 70 milligrams/deciliter  HYDROmorphone  Injectable 0.5 milliGRAM(s) IV Push every 3 hours PRN Severe Pain (7 - 10)  magnesium hydroxide Suspension 30 milliLiter(s) Oral at bedtime PRN Constipation  ondansetron Injectable 4 milliGRAM(s) IV Push every 6 hours PRN Nausea and/or Vomiting  oxyCODONE    IR 5 milliGRAM(s) Oral every 4 hours PRN Mild Pain (1 - 3)  oxyCODONE    IR 10 milliGRAM(s) Oral every 4 hours PRN Moderate Pain (4 - 6)  senna 2 Tablet(s) Oral at bedtime PRN Constipation    Vital Signs Last 24 Hrs  T(F): 99.2 (15 Dec 2018 08:02), Max: 99.2 (15 Dec 2018 08:02)  HR: 96 (15 Dec 2018 08:02) (96 - 103)  BP: 105/57 (15 Dec 2018 08:02) (105/57 - 107/50)  RR: 14 (15 Dec 2018 08:02) (14 - 16)  SpO2: 93% (15 Dec 2018 08:02) (92% - 94%)  I&O's Summary    14 Dec 2018 07:01  -  15 Dec 2018 07:00  --------------------------------------------------------  IN: 1300 mL / OUT: 0 mL / NET: 1300 mL      BMI (kg/m2): 27 (12-13-18 @ 16:22)  PHYSICAL EXAM:  General: NAD, A/O x 3  ENT: MMM  Neck: Supple, No JVD  Lungs: Clear to auscultation bilaterally  Cardio: RRR, S1/S2, Abdomen: Soft, Nontender, Nondistended; Bowel sounds present  Extremities: No calf tenderness, No pitting edema, Left Hip dressing clean/dry /intact thigh soft       LABS:                        8.3    9.0   )-----------( 131      ( 15 Dec 2018 06:00 )             24.7       12-15    141  |  109  |  36  ----------------------------<  142  4.2   |  21  |  2.03    Ca    8.4      15 Dec 2018 06:00    TPro  7.3  /  Alb  3.5  /  TBili  0.4  /  DBili  x   /  AST  24  /  ALT  24  /  AlkPhos  39  12-13     eGFR if Non African American: 22 mL/min/1.73M2 (12-15-18 @ 06:00)  eGFR if African American: 25 mL/min/1.73M2 (12-15-18 @ 06:00)    PT/INR - ( 13 Dec 2018 10:00 )   PT: 11.3 sec;   INR: 1.01 ratio         PTT - ( 13 Dec 2018 10:00 )  PTT:28.8 sec                 CAPILLARY BLOOD GLUCOSE      POCT Blood Glucose.: 141 mg/dL (15 Dec 2018 07:58)  POCT Blood Glucose.: 121 mg/dL (14 Dec 2018 21:03)  POCT Blood Glucose.: 187 mg/dL (14 Dec 2018 17:11)  POCT Blood Glucose.: 184 mg/dL (14 Dec 2018 12:10)    12-15 KxaftotcabX1S 6.1  12-14 LaqqhsqopzC4O 6.0          RADIOLOGY & ADDITIONAL TESTS:  Renal Sono: right renal cysts  Care Discussed with Consultants/Other Providers:

## 2018-12-16 DIAGNOSIS — I82.409 ACUTE EMBOLISM AND THROMBOSIS OF UNSPECIFIED DEEP VEINS OF UNSPECIFIED LOWER EXTREMITY: ICD-10-CM

## 2018-12-16 DIAGNOSIS — R06.82 TACHYPNEA, NOT ELSEWHERE CLASSIFIED: ICD-10-CM

## 2018-12-16 LAB
ANION GAP SERPL CALC-SCNC: 10 MMOL/L — SIGNIFICANT CHANGE UP (ref 5–17)
BLD GP AB SCN SERPL QL: SIGNIFICANT CHANGE UP
BUN SERPL-MCNC: 39 MG/DL — HIGH (ref 7–23)
CALCIUM SERPL-MCNC: 8.4 MG/DL — SIGNIFICANT CHANGE UP (ref 8.4–10.5)
CHLORIDE SERPL-SCNC: 108 MMOL/L — SIGNIFICANT CHANGE UP (ref 96–108)
CO2 SERPL-SCNC: 22 MMOL/L — SIGNIFICANT CHANGE UP (ref 22–31)
CREAT SERPL-MCNC: 1.96 MG/DL — HIGH (ref 0.5–1.3)
GLUCOSE BLDC GLUCOMTR-MCNC: 171 MG/DL — HIGH (ref 70–99)
GLUCOSE BLDC GLUCOMTR-MCNC: 182 MG/DL — HIGH (ref 70–99)
GLUCOSE BLDC GLUCOMTR-MCNC: 190 MG/DL — HIGH (ref 70–99)
GLUCOSE BLDC GLUCOMTR-MCNC: 197 MG/DL — HIGH (ref 70–99)
GLUCOSE SERPL-MCNC: 138 MG/DL — HIGH (ref 70–99)
HCT VFR BLD CALC: 22.6 % — LOW (ref 34.5–45)
HGB BLD-MCNC: 7.6 G/DL — LOW (ref 11.5–15.5)
MCHC RBC-ENTMCNC: 31.5 PG — SIGNIFICANT CHANGE UP (ref 27–34)
MCHC RBC-ENTMCNC: 33.5 GM/DL — SIGNIFICANT CHANGE UP (ref 32–36)
MCV RBC AUTO: 94 FL — SIGNIFICANT CHANGE UP (ref 80–100)
PLATELET # BLD AUTO: 130 K/UL — LOW (ref 150–400)
POTASSIUM SERPL-MCNC: 4 MMOL/L — SIGNIFICANT CHANGE UP (ref 3.5–5.3)
POTASSIUM SERPL-SCNC: 4 MMOL/L — SIGNIFICANT CHANGE UP (ref 3.5–5.3)
RBC # BLD: 2.4 M/UL — LOW (ref 3.8–5.2)
RBC # FLD: 12.2 % — SIGNIFICANT CHANGE UP (ref 10.3–14.5)
SODIUM SERPL-SCNC: 140 MMOL/L — SIGNIFICANT CHANGE UP (ref 135–145)
WBC # BLD: 7.5 K/UL — SIGNIFICANT CHANGE UP (ref 3.8–10.5)
WBC # FLD AUTO: 7.5 K/UL — SIGNIFICANT CHANGE UP (ref 3.8–10.5)

## 2018-12-16 PROCEDURE — 99233 SBSQ HOSP IP/OBS HIGH 50: CPT

## 2018-12-16 RX ORDER — ALPRAZOLAM 0.25 MG
0.25 TABLET ORAL DAILY
Qty: 0 | Refills: 0 | Status: DISCONTINUED | OUTPATIENT
Start: 2018-12-16 | End: 2018-12-20

## 2018-12-16 RX ADMIN — Medication 1: at 17:16

## 2018-12-16 RX ADMIN — Medication 0.25 MILLIGRAM(S): at 11:16

## 2018-12-16 RX ADMIN — Medication 100 MILLIGRAM(S): at 22:29

## 2018-12-16 RX ADMIN — Medication 100 MILLIGRAM(S): at 05:03

## 2018-12-16 RX ADMIN — POLYETHYLENE GLYCOL 3350 17 GRAM(S): 17 POWDER, FOR SOLUTION ORAL at 11:18

## 2018-12-16 RX ADMIN — PANTOPRAZOLE SODIUM 40 MILLIGRAM(S): 20 TABLET, DELAYED RELEASE ORAL at 05:03

## 2018-12-16 RX ADMIN — ATORVASTATIN CALCIUM 10 MILLIGRAM(S): 80 TABLET, FILM COATED ORAL at 22:29

## 2018-12-16 RX ADMIN — Medication 1: at 07:59

## 2018-12-16 RX ADMIN — Medication 100 MILLIGRAM(S): at 13:43

## 2018-12-16 RX ADMIN — APIXABAN 10 MILLIGRAM(S): 2.5 TABLET, FILM COATED ORAL at 17:19

## 2018-12-16 RX ADMIN — Medication 650 MILLIGRAM(S): at 17:30

## 2018-12-16 RX ADMIN — APIXABAN 10 MILLIGRAM(S): 2.5 TABLET, FILM COATED ORAL at 05:03

## 2018-12-16 RX ADMIN — AMLODIPINE BESYLATE 5 MILLIGRAM(S): 2.5 TABLET ORAL at 05:03

## 2018-12-16 RX ADMIN — Medication 1: at 12:13

## 2018-12-16 RX ADMIN — Medication 650 MILLIGRAM(S): at 18:00

## 2018-12-16 NOTE — PROGRESS NOTE ADULT - SUBJECTIVE AND OBJECTIVE BOX
Patient is a 86y old  Female who presents with a chief complaint of Fall, left hip pain (16 Dec 2018 09:36). Patient feeling tired this morning. Started on apixiban yesterday for bilat DVT.      Patient seen and examined at bedside.    ALLERGIES:  penicillin (Rash; Urticaria)    MEDICATIONS  (STANDING):  acetaminophen  IVPB .. 1000 milliGRAM(s) IV Intermittent once  ALPRAZolam 0.25 milliGRAM(s) Oral daily  amLODIPine   Tablet 5 milliGRAM(s) Oral daily  apixaban 10 milliGRAM(s) Oral every 12 hours  atorvastatin 10 milliGRAM(s) Oral at bedtime  dextrose 5%. 1000 milliLiter(s) (50 mL/Hr) IV Continuous <Continuous>  dextrose 50% Injectable 12.5 Gram(s) IV Push once  dextrose 50% Injectable 25 Gram(s) IV Push once  dextrose 50% Injectable 25 Gram(s) IV Push once  docusate sodium 100 milliGRAM(s) Oral three times a day  insulin lispro (HumaLOG) corrective regimen sliding scale   SubCutaneous three times a day before meals  pantoprazole    Tablet 40 milliGRAM(s) Oral before breakfast  polyethylene glycol 3350 17 Gram(s) Oral daily    MEDICATIONS  (PRN):  acetaminophen   Tablet .. 650 milliGRAM(s) Oral every 6 hours PRN Mild Pain (1 - 3)  aluminum hydroxide/magnesium hydroxide/simethicone Suspension 30 milliLiter(s) Oral four times a day PRN Indigestion  bisacodyl Suppository 10 milliGRAM(s) Rectal daily PRN If no bowel movement by POD#2  dextrose 40% Gel 15 Gram(s) Oral once PRN Blood Glucose LESS THAN 70 milliGRAM(s)/deciliter  glucagon  Injectable 1 milliGRAM(s) IntraMuscular once PRN Glucose LESS THAN 70 milligrams/deciliter  HYDROmorphone  Injectable 0.5 milliGRAM(s) IV Push every 3 hours PRN Severe Pain (7 - 10)  magnesium hydroxide Suspension 30 milliLiter(s) Oral at bedtime PRN Constipation  ondansetron Injectable 4 milliGRAM(s) IV Push every 6 hours PRN Nausea and/or Vomiting  oxyCODONE    IR 5 milliGRAM(s) Oral every 4 hours PRN Mild Pain (1 - 3)  oxyCODONE    IR 10 milliGRAM(s) Oral every 4 hours PRN Moderate Pain (4 - 6)  senna 2 Tablet(s) Oral at bedtime PRN Constipation    Vital Signs Last 24 Hrs  T(F): 98 (16 Dec 2018 09:54), Max: 99 (16 Dec 2018 04:53)  HR: 98 (16 Dec 2018 09:54) (76 - 98)  BP: 105/58 (16 Dec 2018 09:54) (98/55 - 126/50)  RR: 14 (16 Dec 2018 09:54) (14 - 18)  SpO2: 97% (16 Dec 2018 09:54) (93% - 97%)  I&O's Summary    15 Dec 2018 07:01  -  16 Dec 2018 07:00  --------------------------------------------------------  IN: 240 mL / OUT: 0 mL / NET: 240 mL      BMI (kg/m2): 27 (12-13-18 @ 16:22)  PHYSICAL EXAM:  General: NAD, A/O x 3  ENT: MMM  Neck: Supple, No JVD  Lungs: Clear to auscultation bilaterally  Cardio: RRR, S1/S2  Abdomen: Soft, Nontender, Nondistended; Bowel sounds present  Extremities: No calf tenderness, No pitting edema , Left Hip dressing clean/dry /intact thigh soft     LABS:                        7.6    7.5   )-----------( 130      ( 16 Dec 2018 06:05 )             22.6       12-16    140  |  108  |  39  ----------------------------<  138  4.0   |  22  |  1.96    Ca    8.4      16 Dec 2018 06:05       eGFR if Non African American: 23 mL/min/1.73M2 (12-16-18 @ 06:05)  eGFR if African American: 26 mL/min/1.73M2 (12-16-18 @ 06:05)                     CAPILLARY BLOOD GLUCOSE      POCT Blood Glucose.: 171 mg/dL (16 Dec 2018 11:23)  POCT Blood Glucose.: 197 mg/dL (16 Dec 2018 07:51)  POCT Blood Glucose.: 148 mg/dL (15 Dec 2018 21:21)  POCT Blood Glucose.: 170 mg/dL (15 Dec 2018 17:16)    12-15 NsoindtprkN8C 6.1  12-14 JcmifnxfjeP5I 6.0          RADIOLOGY & ADDITIONAL TESTS:  LE Venous doppler (12/15/18) Bilat DVT     Care Discussed with Consultants/Other Providers:   Surgery

## 2018-12-16 NOTE — PROGRESS NOTE ADULT - SUBJECTIVE AND OBJECTIVE BOX
BRIT YO      86y Female                                                                                                                               POD #        STATUS POST:               Pre-Op Dx: Hip fracture: Left femoral neck fracture    Post-Op Dx:  Hip fracture: Left hip femoral neck fracture    Procedure: Hemiarthroplasty of hip: Left hip hemiarthroplasy                                                  T(F): 99  HR: 86  BP: 113/55  RR: 14  SpO2: 93%                        7.6    7.5   )-----------( 130      ( 16 Dec 2018 06:05 )             22.6                     12-16    140  |  108  |  39<H>  ----------------------------<  138<H>  4.0   |  22  |  1.96<H>    Ca    8.4      16 Dec 2018 06:05        Physical Exam :    -   Dressing sterile, C/D/I.   -   Distal Neurvascular status intact grossly.   -   Warm well perfused; capillary refill <3 seconds   -   (+)EHL/FHL 5/5  -   (+) Sensation to light touch  -   (+) Calf tenderness Bilaterally    A/P: 86y Female s/p Hip fracture: Left hip hemiarthroplasty, + DVT b/l, postop anemia    -   Ortho Stable  -   Advise transfuse 1unit PRBC's  -   Pain control   -   Medicine to follow  -   DVT tx:     [ ]SCDs     [ ] ASA     [x ] Eliquis     [ ] Lovenox  -   Weight bearing status:  WBAT [x]        PWB    [ ]     TTWB  [ ]      NWB  [ ]   -  Dispo:     Home [ ]     Acute Rehab [ ]     [facility] [x]     TBD [ ]

## 2018-12-16 NOTE — PROGRESS NOTE ADULT - PROBLEM SELECTOR PLAN 5
PASTORA on CKD-Baseline creat 1.58  Renal sono done with right renal cyst.  ARB and Metformin on hold  Avoid nephrotoxins

## 2018-12-16 NOTE — PROGRESS NOTE ADULT - ASSESSMENT
Pt is a 86 y o F from home with PMH HTN, DMII, CKD stage IV. HLD, presented with fall admitted for acute left hip fracture. POD #3. Hospital course c/b bilat DVT, now on Apixiban.  Noted to be tachypneic this AM with small O2 requirement, concern for PE.  Not a candidate for CTA chest given renal function.  Will pursue VQ scan. For now, pt on therapeutic dose Apixiban.  Hospital curse further c/b worsening post op anemia.  Patient to be transfused this afternoon

## 2018-12-16 NOTE — PROGRESS NOTE ADULT - PROBLEM SELECTOR PLAN 4
-LE duplex with bilat DVT  -started on apixiban 10 BID x 7 days then 5 mg BID   -Tachypnea this AM (22 resp/min) and small O2 requirement (2L NC), concern for PE.  Patient not tachycardic or hypotensive. Will pursue VQ scan tomorrow (Not a candidate for CTA chest given poor renal function)

## 2018-12-16 NOTE — PROGRESS NOTE ADULT - PROBLEM SELECTOR PLAN 3
RR 22 resp/min-c/f PE  Afebrile and hemodynamically stable.  For VQ scan tomorrow to r.o PE(Not a candidate for CTA chest given poor renal function)

## 2018-12-17 DIAGNOSIS — I10 ESSENTIAL (PRIMARY) HYPERTENSION: ICD-10-CM

## 2018-12-17 DIAGNOSIS — I82.403 ACUTE EMBOLISM AND THROMBOSIS OF UNSPECIFIED DEEP VEINS OF LOWER EXTREMITY, BILATERAL: ICD-10-CM

## 2018-12-17 DIAGNOSIS — S72.002A FRACTURE OF UNSPECIFIED PART OF NECK OF LEFT FEMUR, INITIAL ENCOUNTER FOR CLOSED FRACTURE: ICD-10-CM

## 2018-12-17 LAB
ANION GAP SERPL CALC-SCNC: 11 MMOL/L — SIGNIFICANT CHANGE UP (ref 5–17)
BUN SERPL-MCNC: 37 MG/DL — HIGH (ref 7–23)
CALCIUM SERPL-MCNC: 8.3 MG/DL — LOW (ref 8.4–10.5)
CHLORIDE SERPL-SCNC: 109 MMOL/L — HIGH (ref 96–108)
CO2 SERPL-SCNC: 21 MMOL/L — LOW (ref 22–31)
CREAT SERPL-MCNC: 1.73 MG/DL — HIGH (ref 0.5–1.3)
GLUCOSE BLDC GLUCOMTR-MCNC: 150 MG/DL — HIGH (ref 70–99)
GLUCOSE BLDC GLUCOMTR-MCNC: 157 MG/DL — HIGH (ref 70–99)
GLUCOSE BLDC GLUCOMTR-MCNC: 166 MG/DL — HIGH (ref 70–99)
GLUCOSE BLDC GLUCOMTR-MCNC: 252 MG/DL — HIGH (ref 70–99)
GLUCOSE SERPL-MCNC: 150 MG/DL — HIGH (ref 70–99)
HCT VFR BLD CALC: 27.2 % — LOW (ref 34.5–45)
HGB BLD-MCNC: 9.1 G/DL — LOW (ref 11.5–15.5)
MCHC RBC-ENTMCNC: 31.1 PG — SIGNIFICANT CHANGE UP (ref 27–34)
MCHC RBC-ENTMCNC: 33.6 GM/DL — SIGNIFICANT CHANGE UP (ref 32–36)
MCV RBC AUTO: 92.6 FL — SIGNIFICANT CHANGE UP (ref 80–100)
PLATELET # BLD AUTO: 152 K/UL — SIGNIFICANT CHANGE UP (ref 150–400)
POTASSIUM SERPL-MCNC: 4.4 MMOL/L — SIGNIFICANT CHANGE UP (ref 3.5–5.3)
POTASSIUM SERPL-SCNC: 4.4 MMOL/L — SIGNIFICANT CHANGE UP (ref 3.5–5.3)
RBC # BLD: 2.93 M/UL — LOW (ref 3.8–5.2)
RBC # FLD: 12.7 % — SIGNIFICANT CHANGE UP (ref 10.3–14.5)
SODIUM SERPL-SCNC: 141 MMOL/L — SIGNIFICANT CHANGE UP (ref 135–145)
WBC # BLD: 7.3 K/UL — SIGNIFICANT CHANGE UP (ref 3.8–10.5)
WBC # FLD AUTO: 7.3 K/UL — SIGNIFICANT CHANGE UP (ref 3.8–10.5)

## 2018-12-17 PROCEDURE — 99233 SBSQ HOSP IP/OBS HIGH 50: CPT

## 2018-12-17 PROCEDURE — 78582 LUNG VENTILAT&PERFUS IMAGING: CPT | Mod: 26

## 2018-12-17 PROCEDURE — 71046 X-RAY EXAM CHEST 2 VIEWS: CPT | Mod: 26

## 2018-12-17 RX ADMIN — OXYCODONE HYDROCHLORIDE 5 MILLIGRAM(S): 5 TABLET ORAL at 10:25

## 2018-12-17 RX ADMIN — AMLODIPINE BESYLATE 5 MILLIGRAM(S): 2.5 TABLET ORAL at 05:33

## 2018-12-17 RX ADMIN — Medication 650 MILLIGRAM(S): at 23:36

## 2018-12-17 RX ADMIN — APIXABAN 10 MILLIGRAM(S): 2.5 TABLET, FILM COATED ORAL at 17:43

## 2018-12-17 RX ADMIN — Medication 0.25 MILLIGRAM(S): at 08:32

## 2018-12-17 RX ADMIN — Medication 650 MILLIGRAM(S): at 22:35

## 2018-12-17 RX ADMIN — Medication 1: at 16:46

## 2018-12-17 RX ADMIN — ATORVASTATIN CALCIUM 10 MILLIGRAM(S): 80 TABLET, FILM COATED ORAL at 22:33

## 2018-12-17 RX ADMIN — PANTOPRAZOLE SODIUM 40 MILLIGRAM(S): 20 TABLET, DELAYED RELEASE ORAL at 05:33

## 2018-12-17 RX ADMIN — OXYCODONE HYDROCHLORIDE 5 MILLIGRAM(S): 5 TABLET ORAL at 10:55

## 2018-12-17 RX ADMIN — POLYETHYLENE GLYCOL 3350 17 GRAM(S): 17 POWDER, FOR SOLUTION ORAL at 12:52

## 2018-12-17 RX ADMIN — Medication 100 MILLIGRAM(S): at 05:33

## 2018-12-17 RX ADMIN — Medication 3: at 12:52

## 2018-12-17 RX ADMIN — APIXABAN 10 MILLIGRAM(S): 2.5 TABLET, FILM COATED ORAL at 05:33

## 2018-12-17 RX ADMIN — SENNA PLUS 2 TABLET(S): 8.6 TABLET ORAL at 22:33

## 2018-12-17 RX ADMIN — Medication 100 MILLIGRAM(S): at 22:33

## 2018-12-17 RX ADMIN — Medication 100 MILLIGRAM(S): at 12:52

## 2018-12-17 NOTE — PROGRESS NOTE ADULT - PROBLEM SELECTOR PLAN 1
WBAT LLE, THR precautions, continue PT  pain controlled  d/c planning to rehab  monitor H/H stabilizing

## 2018-12-17 NOTE — PROGRESS NOTE ADULT - PROBLEM SELECTOR PLAN 1
POD #4, ortho following  wbat lle, thr precautions, pain management  pt evaluation for likely sydnee

## 2018-12-17 NOTE — PROGRESS NOTE ADULT - ASSESSMENT
Pt is a 86 y o F from home with PMH HTN, DMII, CKD stage IV. HLD, presented with fall admitted for acute left hip fracture. POD #4. Hospital course c/b bilat DVT and post op anemia, patient s/p one unit prbc's and is now on therapeutic eliquis.  Plan for V/Q scan today.

## 2018-12-17 NOTE — PROGRESS NOTE ADULT - PROBLEM SELECTOR PLAN 5
baseline creatinine 1.58  jorge luis/ckd 4  creatinine today 1.73 down from 1.96  avoid nephrotoxins f/u bmp

## 2018-12-17 NOTE — PROGRESS NOTE ADULT - SUBJECTIVE AND OBJECTIVE BOX
Patient is a 86y old  Female who presents with a chief complaint of Fall, left hip pain (17 Dec 2018 09:31)          Patient seen and examined at bedside.    ALLERGIES:  penicillin (Rash; Urticaria)        Vital Signs Last 24 Hrs  T(F): 98.9 (17 Dec 2018 05:14), Max: 99.2 (16 Dec 2018 16:09)  HR: 79 (17 Dec 2018 05:14) (79 - 103)  BP: 133/69 (17 Dec 2018 05:14) (98/53 - 133/69)  RR: 15 (17 Dec 2018 05:14) (14 - 15)  SpO2: 94% (17 Dec 2018 05:14) (94% - 97%)  I&O's Summary    16 Dec 2018 07:01  -  17 Dec 2018 07:00  --------------------------------------------------------  IN: 180 mL / OUT: 0 mL / NET: 180 mL    17 Dec 2018 07:01  -  17 Dec 2018 09:53  --------------------------------------------------------  IN: 250 mL / OUT: 0 mL / NET: 250 mL      MEDICATIONS:  acetaminophen   Tablet .. 650 milliGRAM(s) Oral every 6 hours PRN  acetaminophen  IVPB .. 1000 milliGRAM(s) IV Intermittent once  ALPRAZolam 0.25 milliGRAM(s) Oral daily  aluminum hydroxide/magnesium hydroxide/simethicone Suspension 30 milliLiter(s) Oral four times a day PRN  amLODIPine   Tablet 5 milliGRAM(s) Oral daily  apixaban 10 milliGRAM(s) Oral every 12 hours  atorvastatin 10 milliGRAM(s) Oral at bedtime  bisacodyl Suppository 10 milliGRAM(s) Rectal daily PRN  dextrose 40% Gel 15 Gram(s) Oral once PRN  dextrose 5%. 1000 milliLiter(s) IV Continuous <Continuous>  dextrose 50% Injectable 12.5 Gram(s) IV Push once  dextrose 50% Injectable 25 Gram(s) IV Push once  dextrose 50% Injectable 25 Gram(s) IV Push once  docusate sodium 100 milliGRAM(s) Oral three times a day  glucagon  Injectable 1 milliGRAM(s) IntraMuscular once PRN  HYDROmorphone  Injectable 0.5 milliGRAM(s) IV Push every 3 hours PRN  insulin lispro (HumaLOG) corrective regimen sliding scale   SubCutaneous three times a day before meals  magnesium hydroxide Suspension 30 milliLiter(s) Oral at bedtime PRN  ondansetron Injectable 4 milliGRAM(s) IV Push every 6 hours PRN  oxyCODONE    IR 5 milliGRAM(s) Oral every 4 hours PRN  oxyCODONE    IR 10 milliGRAM(s) Oral every 4 hours PRN  pantoprazole    Tablet 40 milliGRAM(s) Oral before breakfast  polyethylene glycol 3350 17 Gram(s) Oral daily  senna 2 Tablet(s) Oral at bedtime PRN      PHYSICAL EXAM:  General: NAD, A/O x 3  ENT: MMM  Neck: Supple, No JVD  Lungs: Clear to auscultation bilaterally  Cardio: S1S2 regular  Abdomen: Soft, Nontender, Nondistended  Extremities: No cyanosis, No edema, Left hip incision staples cdi    LABS:                        9.1    7.3   )-----------( 152      ( 17 Dec 2018 06:45 )             27.2     12-17    141  |  109  |  37  ----------------------------<  150  4.4   |  21  |  1.73    Ca    8.3      17 Dec 2018 06:45      eGFR if Non African American: 26 mL/min/1.73M2 (12-17-18 @ 06:45)  eGFR if African American: 30 mL/min/1.73M2 (12-17-18 @ 06:45)                    CAPILLARY BLOOD GLUCOSE      POCT Blood Glucose.: 150 mg/dL (17 Dec 2018 07:13)  POCT Blood Glucose.: 182 mg/dL (16 Dec 2018 22:39)  POCT Blood Glucose.: 190 mg/dL (16 Dec 2018 16:38)  POCT Blood Glucose.: 171 mg/dL (16 Dec 2018 11:23)    12-15 [id number] 6.1  12-14 [id number] 6.0          RADIOLOGY & ADDITIONAL TESTS:    < from: US Duplex Venous Lower Ext Complete, Bilateral (12.15.18 @ 15:56) >    EXAM:  US DPLX LWR EXT VEINS COMPL BI      PROCEDURE DATE:  12/15/2018        INTERPRETATION:  CLINICAL INFORMATION: Left hip fracture and left hip   prosthesis. Swelling lower extremities.    COMPARISON: 11/23/2016.    TECHNIQUE: Duplex sonographyof the BILATERAL LOWER extremities with   color and spectral Doppler, with and without compression.      FINDINGS:    There is evidence of acute thrombosis involving the distal common   femoral, popliteal and posterior tibial veins on the right side.Common   femoral vein thrombus extends into the bifurcation and profunda femoris.    Evaluation of the left lower extremity demonstrates evidence of acute   thrombosis involving the popliteal vein that extends into the posterior   tibial and peronealveins.    IMPRESSION: Critical finding.    Bilateral acute deep venous thrombosis.    The diagnosis was made at 4:10 PM and patient's nurse Sera was informed   at the same time with read back verification.                    TORI GUERRERO M.D.,ATTENDING RADIOLOGIST  This document has been electronically signed. Dec 15 2018  4:14PM          Care Discussed with Consultants/Other Providers:

## 2018-12-17 NOTE — PROGRESS NOTE ADULT - SUBJECTIVE AND OBJECTIVE BOX
POD #3 left hip hemiarthroplasty    Pt sitting up in bed eating breakfast. no new complaints no SOB No CP    Vital Signs Last 24 Hrs  T(C): 37.2 (17 Dec 2018 05:14), Max: 37.3 (16 Dec 2018 16:09)  T(F): 98.9 (17 Dec 2018 05:14), Max: 99.2 (16 Dec 2018 16:09)  HR: 79 (17 Dec 2018 05:14) (79 - 103)  BP: 133/69 (17 Dec 2018 05:14) (98/53 - 133/69)  BP(mean): --  RR: 15 (17 Dec 2018 05:14) (14 - 15)  SpO2: 94% (17 Dec 2018 05:14) (94% - 97%)    NAD A&O   Chest CTA bilat  cardio S1S2  Abd Snt ND  Left hip incision: aquacell in place minimal drainage noted. staples intact thigh soft  LLE NVI  sensory intact. +calf tenderness + DP +EHL abduction pillow in place                          9.1    7.3   )-----------( 152      ( 17 Dec 2018 06:45 )             27.2       12-17    141  |  109<H>  |  37<H>  ----------------------------<  150<H>  4.4   |  21<L>  |  1.73<H>    Ca    8.3<L>      17 Dec 2018 06:45

## 2018-12-18 LAB
ALBUMIN SERPL ELPH-MCNC: 2.3 G/DL — LOW (ref 3.3–5)
ALP SERPL-CCNC: 45 U/L — SIGNIFICANT CHANGE UP (ref 40–120)
ALT FLD-CCNC: 18 U/L DA — SIGNIFICANT CHANGE UP (ref 10–45)
ANION GAP SERPL CALC-SCNC: 11 MMOL/L — SIGNIFICANT CHANGE UP (ref 5–17)
AST SERPL-CCNC: 14 U/L — SIGNIFICANT CHANGE UP (ref 10–40)
BILIRUB SERPL-MCNC: 0.6 MG/DL — SIGNIFICANT CHANGE UP (ref 0.2–1.2)
BUN SERPL-MCNC: 33 MG/DL — HIGH (ref 7–23)
CALCIUM SERPL-MCNC: 8.7 MG/DL — SIGNIFICANT CHANGE UP (ref 8.4–10.5)
CHLORIDE SERPL-SCNC: 109 MMOL/L — HIGH (ref 96–108)
CO2 SERPL-SCNC: 21 MMOL/L — LOW (ref 22–31)
CREAT SERPL-MCNC: 1.58 MG/DL — HIGH (ref 0.5–1.3)
GLUCOSE BLDC GLUCOMTR-MCNC: 157 MG/DL — HIGH (ref 70–99)
GLUCOSE BLDC GLUCOMTR-MCNC: 162 MG/DL — HIGH (ref 70–99)
GLUCOSE BLDC GLUCOMTR-MCNC: 205 MG/DL — HIGH (ref 70–99)
GLUCOSE BLDC GLUCOMTR-MCNC: 287 MG/DL — HIGH (ref 70–99)
GLUCOSE SERPL-MCNC: 148 MG/DL — HIGH (ref 70–99)
HCT VFR BLD CALC: 29.4 % — LOW (ref 34.5–45)
HGB BLD-MCNC: 9.7 G/DL — LOW (ref 11.5–15.5)
MCHC RBC-ENTMCNC: 30.8 PG — SIGNIFICANT CHANGE UP (ref 27–34)
MCHC RBC-ENTMCNC: 33.1 GM/DL — SIGNIFICANT CHANGE UP (ref 32–36)
MCV RBC AUTO: 93.2 FL — SIGNIFICANT CHANGE UP (ref 80–100)
PLATELET # BLD AUTO: 197 K/UL — SIGNIFICANT CHANGE UP (ref 150–400)
POTASSIUM SERPL-MCNC: 4.3 MMOL/L — SIGNIFICANT CHANGE UP (ref 3.5–5.3)
POTASSIUM SERPL-SCNC: 4.3 MMOL/L — SIGNIFICANT CHANGE UP (ref 3.5–5.3)
PROT SERPL-MCNC: 5.9 G/DL — LOW (ref 6–8.3)
RBC # BLD: 3.15 M/UL — LOW (ref 3.8–5.2)
RBC # FLD: 13.4 % — SIGNIFICANT CHANGE UP (ref 10.3–14.5)
SODIUM SERPL-SCNC: 141 MMOL/L — SIGNIFICANT CHANGE UP (ref 135–145)
WBC # BLD: 8.2 K/UL — SIGNIFICANT CHANGE UP (ref 3.8–10.5)
WBC # FLD AUTO: 8.2 K/UL — SIGNIFICANT CHANGE UP (ref 3.8–10.5)

## 2018-12-18 PROCEDURE — 99232 SBSQ HOSP IP/OBS MODERATE 35: CPT

## 2018-12-18 PROCEDURE — 99233 SBSQ HOSP IP/OBS HIGH 50: CPT

## 2018-12-18 RX ADMIN — Medication 3: at 12:13

## 2018-12-18 RX ADMIN — APIXABAN 10 MILLIGRAM(S): 2.5 TABLET, FILM COATED ORAL at 17:05

## 2018-12-18 RX ADMIN — OXYCODONE HYDROCHLORIDE 10 MILLIGRAM(S): 5 TABLET ORAL at 20:20

## 2018-12-18 RX ADMIN — Medication 0.25 MILLIGRAM(S): at 08:48

## 2018-12-18 RX ADMIN — OXYCODONE HYDROCHLORIDE 5 MILLIGRAM(S): 5 TABLET ORAL at 19:20

## 2018-12-18 RX ADMIN — Medication 1: at 07:58

## 2018-12-18 RX ADMIN — OXYCODONE HYDROCHLORIDE 10 MILLIGRAM(S): 5 TABLET ORAL at 12:07

## 2018-12-18 RX ADMIN — Medication 2: at 17:04

## 2018-12-18 RX ADMIN — Medication 10 MILLIGRAM(S): at 05:02

## 2018-12-18 RX ADMIN — APIXABAN 10 MILLIGRAM(S): 2.5 TABLET, FILM COATED ORAL at 05:02

## 2018-12-18 RX ADMIN — ATORVASTATIN CALCIUM 10 MILLIGRAM(S): 80 TABLET, FILM COATED ORAL at 21:12

## 2018-12-18 RX ADMIN — Medication 100 MILLIGRAM(S): at 12:14

## 2018-12-18 RX ADMIN — POLYETHYLENE GLYCOL 3350 17 GRAM(S): 17 POWDER, FOR SOLUTION ORAL at 12:14

## 2018-12-18 RX ADMIN — OXYCODONE HYDROCHLORIDE 5 MILLIGRAM(S): 5 TABLET ORAL at 11:19

## 2018-12-18 RX ADMIN — Medication 100 MILLIGRAM(S): at 05:02

## 2018-12-18 RX ADMIN — PANTOPRAZOLE SODIUM 40 MILLIGRAM(S): 20 TABLET, DELAYED RELEASE ORAL at 05:02

## 2018-12-18 RX ADMIN — Medication 100 MILLIGRAM(S): at 21:12

## 2018-12-18 RX ADMIN — AMLODIPINE BESYLATE 5 MILLIGRAM(S): 2.5 TABLET ORAL at 05:02

## 2018-12-18 NOTE — PROGRESS NOTE ADULT - ASSESSMENT
Pt is a 86 y o F from home with PMH HTN, DMII, CKD stage IV. HLD, presented with fall admitted for acute left hip fracture. POD #4. Hospital course c/b bilat DVT and post op anemia, patient s/p one unit prbc's and is now on therapeutic eliquis.  V/Q scan mostlikely positive  pt on eliquis  not treatment change

## 2018-12-18 NOTE — PROGRESS NOTE ADULT - SUBJECTIVE AND OBJECTIVE BOX
Mrs. More is an 86 year old woman who fell yesterday evening, noting left hip pain and presenting to [facility]. She was found to have a left hip fracture for which she underwent a hemiarthroplasty by Dr. Brenner. To date, course notable for fatigue and nausea. Hip precautions in place. DVT prophylaxis begun.   Over the weekend, patient found to have bilat DVTs and yesterday's VQ scan revealed intermediate probability of pulmonary embolus. Patient on treatement dose Eliquis.  Also s/p 1 unit PRBCs for anemia and Hen of 7.6. H/h improved and stable.   Patient requiring min A for bed mobility, transfers and ambulation with RW 15 ft. Requiring assist for LE dressing.   Rehab follow up today.     PAST MEDICAL & SURGICAL HISTORY:  Pure hypercholesterolemia  Type 2 diabetes mellitus without complication, without long-term current use of insulin  HTN (hypertension)  History of cholecystectomy    Allergies  penicillin (Rash; Urticaria)      Social Hx: Lives in  on first floor, daughter and family on second floor with grandchildren. Independent prior to fall. No use of AD.       TODAY'S SUBJECTIVE & REVIEW OF SYMPTOMS:  Patient denies SOB at rest. SOB with activity. Denies HA, dizziness, nausea, abdominal pain, CP, palpitations. Reports pain to left hip with movement, no pain at rest.     12-18    141  |  109<H>  |  33<H>  ----------------------------<  148<H>  4.3   |  21<L>  |  1.58<H>    Ca    8.7      18 Dec 2018 06:00    TPro  5.9<L>  /  Alb  2.3<L>  /  TBili  0.6  /  DBili  x   /  AST  14  /  ALT  18  /  AlkPhos  45  12-18                        9.7    8.2   )-----------( 197      ( 18 Dec 2018 06:00 )             29.4     Vital Signs Last 24 Hrs  T(C): 36.5 (18 Dec 2018 08:01), Max: 36.9 (17 Dec 2018 22:40)  T(F): 97.7 (18 Dec 2018 08:01), Max: 98.5 (17 Dec 2018 22:40)  HR: 89 (18 Dec 2018 08:01) (72 - 109)  BP: 140/68 (18 Dec 2018 08:01) (127/60 - 147/71)  BP(mean): --  RR: 16 (18 Dec 2018 08:01) (16 - 16)  SpO2: 94% (18 Dec 2018 08:01) (94% - 94%)      Physical Exam:   Mental Status - Patient is alert, awake, oriented X3. Speech is fluent. Mood and affect  normal.  Some dyspnea during conversation.   Cranial Nerves -2-12 grossly intact   Motor Exam -   4+/5 bilat shoulders, 5/5 elbow and hand grasps bilat, full AROM bilat UE  4/5 right hip flexor, 5/5 knee and ankle strengths   3/5 left hip flexor 2/2 pain, 4/5 left knee extender, 5/5 left ankle strengths   Sensory    Intact to light touch bilaterally.  DTS Reflexes:   equal bilat                                GENERAL Exam:     Nontoxic , No Acute Distress 	  HEENT:  normocephalic, atraumatic		  LUNGS:	Clear bilaterally	  HEART:	 Normal S1S2 	  GI/ ABDOMEN:  Soft  Non tender +BS  EXTREMITIES:   trace edema left LE  MUSCULOSKELETAL: as above   SKIN:     aquacel dressing to left hip c/d/i

## 2018-12-18 NOTE — PROGRESS NOTE ADULT - SUBJECTIVE AND OBJECTIVE BOX
Surgery PA Not:  POD#5    S/P Left Hip Hemiarthroplasty     Patient sitting up in chair this am.  She looks and feels better denies SOB/CP .    Patient seen and examined by me     Vital Signs Last 24 Hrs  T(C): 36.5 (18 Dec 2018 08:01), Max: 36.9 (17 Dec 2018 22:40)  T(F): 97.7 (18 Dec 2018 08:01), Max: 98.5 (17 Dec 2018 22:40)  HR: 89 (18 Dec 2018 08:01) (72 - 109)  BP: 140/68 (18 Dec 2018 08:01) (127/60 - 147/71)  BP(mean): --  RR: 16 (18 Dec 2018 08:01) (16 - 16)  SpO2: 94% (18 Dec 2018 08:01) (92% - 94%)    PE:    Alert and oriented x2 Icelandic speaking but understands.  hx of anxiety   Lungs:  CTA   Cor:  S1S2  Abd:  soft, non tender +BS + BM voiding            appetite improving   Left Hip Dressing clean and dry will change dressing in AM   thigh soft , calf soft , non tender, +EHL/FHL 5/5CD 's when in bed  abduction pillow while in bed.                          9.7    8.2   )-----------( 197      ( 18 Dec 2018 06:00 )             29.4   12-18    141  |  109<H>  |  33<H>  ----------------------------<  148<H>  4.3   |  21<L>  |  1.58<H>    Ca    8.7      18 Dec 2018 06:00    TPro  5.9<L>  /  Alb  2.3<L>  /  TBili  0.6  /  DBili  x   /  AST  14  /  ALT  18  /  AlkPhos  45  12-18

## 2018-12-18 NOTE — PROGRESS NOTE ADULT - ASSESSMENT
Imp:  Surgically stable           Bilateral DVT's            + PE               anxiety , renal insuff improving    PLan:  Resume PT             Eliquis ( therapeutic) for DVT,s , PE,             rehab planning

## 2018-12-18 NOTE — PROGRESS NOTE ADULT - SUBJECTIVE AND OBJECTIVE BOX
Patient is a 86y old  Female who presents with a chief complaint of Fall, left hip pain   s/p ORIF left Hip  Doing well today sitting up in chair.  Pain is controlled        Patient seen and examined at bedside.    ALLERGIES:  penicillin (Rash; Urticaria)    MEDICATIONS  (STANDING):  acetaminophen  IVPB .. 1000 milliGRAM(s) IV Intermittent once  ALPRAZolam 0.25 milliGRAM(s) Oral daily  amLODIPine   Tablet 5 milliGRAM(s) Oral daily  apixaban 10 milliGRAM(s) Oral every 12 hours  atorvastatin 10 milliGRAM(s) Oral at bedtime  dextrose 5%. 1000 milliLiter(s) (50 mL/Hr) IV Continuous <Continuous>  dextrose 50% Injectable 12.5 Gram(s) IV Push once  dextrose 50% Injectable 25 Gram(s) IV Push once  dextrose 50% Injectable 25 Gram(s) IV Push once  docusate sodium 100 milliGRAM(s) Oral three times a day  insulin lispro (HumaLOG) corrective regimen sliding scale   SubCutaneous three times a day before meals  pantoprazole    Tablet 40 milliGRAM(s) Oral before breakfast  polyethylene glycol 3350 17 Gram(s) Oral daily    MEDICATIONS  (PRN):  acetaminophen   Tablet .. 650 milliGRAM(s) Oral every 6 hours PRN Mild Pain (1 - 3)  aluminum hydroxide/magnesium hydroxide/simethicone Suspension 30 milliLiter(s) Oral four times a day PRN Indigestion  bisacodyl Suppository 10 milliGRAM(s) Rectal daily PRN If no bowel movement by POD#2  dextrose 40% Gel 15 Gram(s) Oral once PRN Blood Glucose LESS THAN 70 milliGRAM(s)/deciliter  glucagon  Injectable 1 milliGRAM(s) IntraMuscular once PRN Glucose LESS THAN 70 milligrams/deciliter  HYDROmorphone  Injectable 0.5 milliGRAM(s) IV Push every 3 hours PRN Severe Pain (7 - 10)  magnesium hydroxide Suspension 30 milliLiter(s) Oral at bedtime PRN Constipation  ondansetron Injectable 4 milliGRAM(s) IV Push every 6 hours PRN Nausea and/or Vomiting  oxyCODONE    IR 5 milliGRAM(s) Oral every 4 hours PRN Mild Pain (1 - 3)  oxyCODONE    IR 10 milliGRAM(s) Oral every 4 hours PRN Moderate Pain (4 - 6)  senna 2 Tablet(s) Oral at bedtime PRN Constipation    Vital Signs Last 24 Hrs  T(F): 97.7 (18 Dec 2018 08:01), Max: 98.5 (17 Dec 2018 22:40)  HR: 89 (18 Dec 2018 08:01) (72 - 109)  BP: 140/68 (18 Dec 2018 08:01) (127/60 - 147/71)  RR: 16 (18 Dec 2018 08:01) (16 - 16)  SpO2: 94% (18 Dec 2018 08:01) (92% - 94%)  I&O's Summary    17 Dec 2018 07:01  -  18 Dec 2018 07:00  --------------------------------------------------------  IN: 690 mL / OUT: 0 mL / NET: 690 mL      PHYSICAL EXAM:  General: NAD, A/O x 3  ENT: MMM  Neck: Supple, No JVD  Lungs: Clear to auscultation bilaterally  Cardio: RRR, S1/S2, No murmurs  Abdomen: Soft, Nontender, Nondistended; Bowel sounds present  Extremities: No calf tenderness, No pitting edema s/p ORIF left hip    LABS:                        9.7    8.2   )-----------( 197      ( 18 Dec 2018 06:00 )             29.4     12-18    141  |  109  |  33  ----------------------------<  148  4.3   |  21  |  1.58    Ca    8.7      18 Dec 2018 06:00    TPro  5.9  /  Alb  2.3  /  TBili  0.6  /  DBili  x   /  AST  14  /  ALT  18  /  AlkPhos  45  12-18    eGFR if Non African American: 29 mL/min/1.73M2 (12-18-18 @ 06:00)  eGFR if African American: 34 mL/min/1.73M2 (12-18-18 @ 06:00)                    CAPILLARY BLOOD GLUCOSE      POCT Blood Glucose.: 157 mg/dL (18 Dec 2018 07:57)  POCT Blood Glucose.: 157 mg/dL (17 Dec 2018 22:29)  POCT Blood Glucose.: 166 mg/dL (17 Dec 2018 16:24)  POCT Blood Glucose.: 252 mg/dL (17 Dec 2018 12:47)    12-15 SomtygvebdC4K 6.1  12-14 TeamfyopnfJ1Y 6.0        RADIOLOGY & ADDITIONAL TESTS:    Care Discussed with Consultants/Other Providers:

## 2018-12-18 NOTE — PROGRESS NOTE ADULT - ASSESSMENT
86 woman s/p left hip fracture treated with hemiarthroplasty on 12/13 with functional deficits consisting of gait and ADL impairments with decreased endurance and strength.       Recs: Continue medical management per primary team.   Discussed patient with ortho PA-no activity limitations.   Will continue to follow patient. May be able to tolerate acute IPR program but will trend labs and vital signs overnight before making final recommendations.

## 2018-12-19 ENCOUNTER — TRANSCRIPTION ENCOUNTER (OUTPATIENT)
Age: 83
End: 2018-12-19

## 2018-12-19 LAB
ANION GAP SERPL CALC-SCNC: 11 MMOL/L — SIGNIFICANT CHANGE UP (ref 5–17)
BUN SERPL-MCNC: 30 MG/DL — HIGH (ref 7–23)
CALCIUM SERPL-MCNC: 8.4 MG/DL — SIGNIFICANT CHANGE UP (ref 8.4–10.5)
CHLORIDE SERPL-SCNC: 109 MMOL/L — HIGH (ref 96–108)
CO2 SERPL-SCNC: 21 MMOL/L — LOW (ref 22–31)
CREAT SERPL-MCNC: 1.53 MG/DL — HIGH (ref 0.5–1.3)
GLUCOSE BLDC GLUCOMTR-MCNC: 138 MG/DL — HIGH (ref 70–99)
GLUCOSE BLDC GLUCOMTR-MCNC: 149 MG/DL — HIGH (ref 70–99)
GLUCOSE BLDC GLUCOMTR-MCNC: 194 MG/DL — HIGH (ref 70–99)
GLUCOSE BLDC GLUCOMTR-MCNC: 228 MG/DL — HIGH (ref 70–99)
GLUCOSE SERPL-MCNC: 148 MG/DL — HIGH (ref 70–99)
HCT VFR BLD CALC: 28.1 % — LOW (ref 34.5–45)
HGB BLD-MCNC: 9.4 G/DL — LOW (ref 11.5–15.5)
MCHC RBC-ENTMCNC: 31 PG — SIGNIFICANT CHANGE UP (ref 27–34)
MCHC RBC-ENTMCNC: 33.5 GM/DL — SIGNIFICANT CHANGE UP (ref 32–36)
MCV RBC AUTO: 92.3 FL — SIGNIFICANT CHANGE UP (ref 80–100)
PLATELET # BLD AUTO: 218 K/UL — SIGNIFICANT CHANGE UP (ref 150–400)
POTASSIUM SERPL-MCNC: 4.1 MMOL/L — SIGNIFICANT CHANGE UP (ref 3.5–5.3)
POTASSIUM SERPL-SCNC: 4.1 MMOL/L — SIGNIFICANT CHANGE UP (ref 3.5–5.3)
RBC # BLD: 3.04 M/UL — LOW (ref 3.8–5.2)
RBC # FLD: 12.9 % — SIGNIFICANT CHANGE UP (ref 10.3–14.5)
SODIUM SERPL-SCNC: 141 MMOL/L — SIGNIFICANT CHANGE UP (ref 135–145)
WBC # BLD: 7.7 K/UL — SIGNIFICANT CHANGE UP (ref 3.8–10.5)
WBC # FLD AUTO: 7.7 K/UL — SIGNIFICANT CHANGE UP (ref 3.8–10.5)

## 2018-12-19 PROCEDURE — 99239 HOSP IP/OBS DSCHRG MGMT >30: CPT

## 2018-12-19 PROCEDURE — 99232 SBSQ HOSP IP/OBS MODERATE 35: CPT

## 2018-12-19 RX ORDER — APIXABAN 2.5 MG/1
2 TABLET, FILM COATED ORAL
Qty: 0 | Refills: 0 | COMMUNITY
Start: 2018-12-19 | End: 2018-12-22

## 2018-12-19 RX ORDER — ACETAMINOPHEN 500 MG
2 TABLET ORAL
Qty: 0 | Refills: 0 | COMMUNITY
Start: 2018-12-19

## 2018-12-19 RX ORDER — ACETAMINOPHEN 500 MG
100 TABLET ORAL
Qty: 0 | Refills: 0 | COMMUNITY
Start: 2018-12-19

## 2018-12-19 RX ORDER — OXYCODONE HYDROCHLORIDE 5 MG/1
1 TABLET ORAL
Qty: 0 | Refills: 0 | COMMUNITY
Start: 2018-12-19

## 2018-12-19 RX ADMIN — Medication 2: at 12:35

## 2018-12-19 RX ADMIN — Medication 100 MILLIGRAM(S): at 21:08

## 2018-12-19 RX ADMIN — AMLODIPINE BESYLATE 5 MILLIGRAM(S): 2.5 TABLET ORAL at 05:18

## 2018-12-19 RX ADMIN — APIXABAN 10 MILLIGRAM(S): 2.5 TABLET, FILM COATED ORAL at 16:52

## 2018-12-19 RX ADMIN — Medication 0.25 MILLIGRAM(S): at 08:13

## 2018-12-19 RX ADMIN — PANTOPRAZOLE SODIUM 40 MILLIGRAM(S): 20 TABLET, DELAYED RELEASE ORAL at 05:18

## 2018-12-19 RX ADMIN — Medication 100 MILLIGRAM(S): at 05:18

## 2018-12-19 RX ADMIN — POLYETHYLENE GLYCOL 3350 17 GRAM(S): 17 POWDER, FOR SOLUTION ORAL at 12:36

## 2018-12-19 RX ADMIN — APIXABAN 10 MILLIGRAM(S): 2.5 TABLET, FILM COATED ORAL at 05:18

## 2018-12-19 RX ADMIN — ATORVASTATIN CALCIUM 10 MILLIGRAM(S): 80 TABLET, FILM COATED ORAL at 21:08

## 2018-12-19 RX ADMIN — Medication 100 MILLIGRAM(S): at 12:36

## 2018-12-19 NOTE — PROGRESS NOTE ADULT - ASSESSMENT
86 woman s/p left hip fracture treated with hemiarthroplasty on 12/13 with functional deficits consisting of gait and ADL impairments with decreased endurance and strength, and newly diagnosed bilat DVTs and PE.       Recs: Continue medical management per primary team.   Discussed patient with ortho PA-no activity limitations.   Discussed possibility of vascular consult to evaluate for any recs beyond treatment dose of Eliquis needed with hospitalist group. At this time, they do not feel vascular consult is needed.  Patient will be able to tolerate 3 hours a day 5 days a week of acute IPR.   Patient will benefit from PT and OT services in an acute IPR setting to work on transfers, ambulation, ADLs, increasing strength and endurance. ELOS 14 days. Patient will need daily physician monitoring and 24 hour nursing care.   Will accept patient once insurance auth is confirmed.

## 2018-12-19 NOTE — DISCHARGE NOTE ADULT - PLAN OF CARE
continue follow up as outpatient stable continue statin stable resume full function as prior to event Acute rehab post op stable now continue eloquis continue norvasc and Valsartin

## 2018-12-19 NOTE — DISCHARGE NOTE ADULT - CARE PLAN
Principal Discharge DX:	Closed fracture of left hip, initial encounter  Goal:	resume full function as prior to event  Assessment and plan of treatment:	Acute rehab  Secondary Diagnosis:	Anemia due to blood loss  Goal:	stable  Assessment and plan of treatment:	post op stable now  Secondary Diagnosis:	Deep vein thrombosis (DVT) of both lower extremities, unspecified chronicity, unspecified vein  Assessment and plan of treatment:	continue eloquis  Secondary Diagnosis:	Essential hypertension  Assessment and plan of treatment:	continue norvasc and Valsartin  Secondary Diagnosis:	Mediastinal mass  Assessment and plan of treatment:	continue follow up as outpatient  Secondary Diagnosis:	Pure hypercholesterolemia  Assessment and plan of treatment:	stable continue statin  Secondary Diagnosis:	Stage 4 chronic kidney disease  Assessment and plan of treatment:	stable

## 2018-12-19 NOTE — PROGRESS NOTE ADULT - SUBJECTIVE AND OBJECTIVE BOX
Patient is a 86y old  Female who presents with a chief complaint of Fall, left hip pain . complicated by bilateral DVT and PE without complaints.  Patient seen and examined at bedside.    ALLERGIES:  penicillin (Rash; Urticaria)    MEDICATIONS  (STANDING):  acetaminophen  IVPB .. 1000 milliGRAM(s) IV Intermittent once  ALPRAZolam 0.25 milliGRAM(s) Oral daily  amLODIPine   Tablet 5 milliGRAM(s) Oral daily  apixaban 10 milliGRAM(s) Oral every 12 hours  atorvastatin 10 milliGRAM(s) Oral at bedtime  dextrose 5%. 1000 milliLiter(s) (50 mL/Hr) IV Continuous <Continuous>  dextrose 50% Injectable 12.5 Gram(s) IV Push once  dextrose 50% Injectable 25 Gram(s) IV Push once  dextrose 50% Injectable 25 Gram(s) IV Push once  docusate sodium 100 milliGRAM(s) Oral three times a day  insulin lispro (HumaLOG) corrective regimen sliding scale   SubCutaneous three times a day before meals  pantoprazole    Tablet 40 milliGRAM(s) Oral before breakfast  polyethylene glycol 3350 17 Gram(s) Oral daily    MEDICATIONS  (PRN):  acetaminophen   Tablet .. 650 milliGRAM(s) Oral every 6 hours PRN Mild Pain (1 - 3)  aluminum hydroxide/magnesium hydroxide/simethicone Suspension 30 milliLiter(s) Oral four times a day PRN Indigestion  bisacodyl Suppository 10 milliGRAM(s) Rectal daily PRN If no bowel movement by POD#2  dextrose 40% Gel 15 Gram(s) Oral once PRN Blood Glucose LESS THAN 70 milliGRAM(s)/deciliter  glucagon  Injectable 1 milliGRAM(s) IntraMuscular once PRN Glucose LESS THAN 70 milligrams/deciliter  HYDROmorphone  Injectable 0.5 milliGRAM(s) IV Push every 3 hours PRN Severe Pain (7 - 10)  magnesium hydroxide Suspension 30 milliLiter(s) Oral at bedtime PRN Constipation  ondansetron Injectable 4 milliGRAM(s) IV Push every 6 hours PRN Nausea and/or Vomiting  oxyCODONE    IR 5 milliGRAM(s) Oral every 4 hours PRN Mild Pain (1 - 3)  oxyCODONE    IR 10 milliGRAM(s) Oral every 4 hours PRN Moderate Pain (4 - 6)  senna 2 Tablet(s) Oral at bedtime PRN Constipation    Vital Signs Last 24 Hrs  T(F): 97.8 (19 Dec 2018 08:07), Max: 98.9 (18 Dec 2018 15:00)  HR: 87 (19 Dec 2018 08:07) (79 - 89)  BP: 140/62 (19 Dec 2018 08:07) (127/56 - 140/62)  RR: 16 (19 Dec 2018 08:07) (16 - 16)  SpO2: 96% (19 Dec 2018 08:07) (94% - 96%)  I&O's Summary    18 Dec 2018 07:01  -  19 Dec 2018 07:00  --------------------------------------------------------  IN: 700 mL / OUT: 0 mL / NET: 700 mL      PHYSICAL EXAM:  General: NAD, A/O x 3  ENT: MMM  Neck: Supple, No JVD  Lungs: Clear to auscultation bilaterally  Cardio: RRR, S1/S2, No murmurs  Abdomen: Soft, Nontender, Nondistended; Bowel sounds present  Extremities: No calf tenderness, S/P left hip ORIF    LABS:                        9.4    7.7   )-----------( 218      ( 19 Dec 2018 05:45 )             28.1     12-19    141  |  109  |  30  ----------------------------<  148  4.1   |  21  |  1.53    Ca    8.4      19 Dec 2018 05:45    TPro  5.9  /  Alb  2.3  /  TBili  0.6  /  DBili  x   /  AST  14  /  ALT  18  /  AlkPhos  45  12-18    eGFR if Non African American: 31 mL/min/1.73M2 (12-19-18 @ 05:45)  eGFR if African American: 35 mL/min/1.73M2 (12-19-18 @ 05:45)                    CAPILLARY BLOOD GLUCOSE      POCT Blood Glucose.: 138 mg/dL (19 Dec 2018 08:04)  POCT Blood Glucose.: 162 mg/dL (18 Dec 2018 21:19)  POCT Blood Glucose.: 205 mg/dL (18 Dec 2018 17:02)  POCT Blood Glucose.: 287 mg/dL (18 Dec 2018 12:12)    12-15 JlgdbypmzxU3M 6.1  12-14 LcadnukdpqH5S 6.0        RADIOLOGY & ADDITIONAL TESTS:    Care Discussed with Consultants/Other Providers:

## 2018-12-19 NOTE — PROGRESS NOTE ADULT - SUBJECTIVE AND OBJECTIVE BOX
Mrs. More is an 86 year old woman who fell yesterday evening, noting left hip pain and presenting to [facility]. She was found to have a left hip fracture for which she underwent a hemiarthroplasty by Dr. Brenner. To date, course notable for fatigue and nausea. Hip precautions in place. DVT prophylaxis begun.   Over the weekend, patient found to have bilat DVTs and yesterday's VQ scan revealed intermediate probability of pulmonary embolus. Patient on treatement dose Eliquis.  Also s/p 1 unit PRBCs for anemia and Hen of 7.6. H/h improved and stable.   Patient still requiring min A for bed mobility, transfers and ambulation with RW 15 ft. Requiring assist for LE dressing.   Rehab follow up today.     PAST MEDICAL & SURGICAL HISTORY:  Pure hypercholesterolemia  Type 2 diabetes mellitus without complication, without long-term current use of insulin  HTN (hypertension)  History of cholecystectomy    Allergies  penicillin (Rash; Urticaria)      Social Hx: Lives in  on first floor, son and daughter-in-law on second floor with grandchildren. No stairs to enter her living quarters. Independent prior to fall. No use of AD.       TODAY'S SUBJECTIVE & REVIEW OF SYMPTOMS:  Patient denies SOB at rest. MILD SOB with activity. Denies HA, dizziness,, CP, palpitations. Reports pain to left hip with movement, no pain at rest. Pain with movement tolerable.   Daughter-in-law at bedside     12-19    141  |  109<H>  |  30<H>  ----------------------------<  148<H>  4.1   |  21<L>  |  1.53<H>    Ca    8.4      19 Dec 2018 05:45    TPro  5.9<L>  /  Alb  2.3<L>  /  TBili  0.6  /  DBili  x   /  AST  14  /  ALT  18  /  AlkPhos  45  12-18                        9.4    7.7   )-----------( 218      ( 19 Dec 2018 05:45 )             28.1   < from: US Duplex Venous Lower Ext Complete, Bilateral (12.15.18 @ 15:56) >  EXAM:  US DPLX LWR EXT VEINS COMPL BI      PROCEDURE DATE:  12/15/2018        INTERPRETATION:  CLINICAL INFORMATION: Left hip fracture and left hip   prosthesis. Swelling lower extremities.    COMPARISON: 11/23/2016.    TECHNIQUE: Duplex sonographyof the BILATERAL LOWER extremities with   color and spectral Doppler, with and without compression.      FINDINGS:    There is evidence of acute thrombosis involving the distal common   femoral, popliteal and posterior tibial veins on the right side.Common   femoral vein thrombus extends into the bifurcation and profunda femoris.    Evaluation of the left lower extremity demonstrates evidence of acute   thrombosis involving the popliteal vein that extends into the posterior   tibial and peronealveins.    IMPRESSION: Critical finding.    Bilateral acute deep venous thrombosis.    The diagnosis was made at 4:10 PM and patient's nurse Sera was informed   at the same time with read back verification.                    TORI GUERRERO M.D.,ATTENDING RADIOLOGIST    < end of copied text >    < from: NM Pulmonary Ventilation/Perfusion Scan (12.17.18 @ 12:28) >    EXAM:  NM PULM VENTILATION PERFUS IMG      PROCEDURE DATE:  12/17/2018        INTERPRETATION:  RADIOPHARMACEUTICAL: 1.0 mCi Tc-99m-DTPA via inhalation;   6.2 mCi Tc-99m-MAA, I.V.    CLINICAL INFORMATION: 86 year old female status post left hip   hemiarthroplasty presents with DVT and hypoxia; referred to evaluate for   pulmonary embolism.    TECHNIQUE:  Ventilation and perfusion images of the lungs were obtained   following administration of Tc-99m-DTPA and Tc-99m-MAA. Images were   obtained inthe anterior, posterior, both lateral, and all 4 oblique   projections. The study was interpreted in conjunction with a chest   radiograph of 12/17/2018.    COMPARISON: Lung scan performed on 11/23/2016 was reviewed.    FINDINGS: There is a large mismatched ventilation/perfusion defect in the   anterior segment of the right upper lobe and a moderate-sized mismatched   ventilation/perfusion defect in the posterior segment of the right upper   lobe. There are no corresponding chest x-ray opacities.These   abnormalities are new since the previous study of 11/23/2016. There is   heterogeneous distribution of radiopharmaceutical in the remainder of   both lungs on the ventilation and perfusion images.    IMPRESSION: Abnormal ventilation/perfusionlung scan. Intermediate   probability of pulmonary embolus.    Dr. MONICA Vidal was informed of these results by Dr. KRANTHI Figueroa via   telephone with read back at about 12:30 PM on 12/17/2018.             < end of copied text >    MEDICATIONS  (STANDING):  acetaminophen  IVPB .. 1000 milliGRAM(s) IV Intermittent once  ALPRAZolam 0.25 milliGRAM(s) Oral daily  amLODIPine   Tablet 5 milliGRAM(s) Oral daily  apixaban 10 milliGRAM(s) Oral every 12 hours  atorvastatin 10 milliGRAM(s) Oral at bedtime  dextrose 5%. 1000 milliLiter(s) (50 mL/Hr) IV Continuous <Continuous>  dextrose 50% Injectable 12.5 Gram(s) IV Push once  dextrose 50% Injectable 25 Gram(s) IV Push once  dextrose 50% Injectable 25 Gram(s) IV Push once  docusate sodium 100 milliGRAM(s) Oral three times a day  insulin lispro (HumaLOG) corrective regimen sliding scale   SubCutaneous three times a day before meals  pantoprazole    Tablet 40 milliGRAM(s) Oral before breakfast  polyethylene glycol 3350 17 Gram(s) Oral daily    MEDICATIONS  (PRN):  acetaminophen   Tablet .. 650 milliGRAM(s) Oral every 6 hours PRN Mild Pain (1 - 3)  aluminum hydroxide/magnesium hydroxide/simethicone Suspension 30 milliLiter(s) Oral four times a day PRN Indigestion  bisacodyl Suppository 10 milliGRAM(s) Rectal daily PRN If no bowel movement by POD#2  dextrose 40% Gel 15 Gram(s) Oral once PRN Blood Glucose LESS THAN 70 milliGRAM(s)/deciliter  glucagon  Injectable 1 milliGRAM(s) IntraMuscular once PRN Glucose LESS THAN 70 milligrams/deciliter  HYDROmorphone  Injectable 0.5 milliGRAM(s) IV Push every 3 hours PRN Severe Pain (7 - 10)  magnesium hydroxide Suspension 30 milliLiter(s) Oral at bedtime PRN Constipation  ondansetron Injectable 4 milliGRAM(s) IV Push every 6 hours PRN Nausea and/or Vomiting  oxyCODONE    IR 5 milliGRAM(s) Oral every 4 hours PRN Mild Pain (1 - 3)  oxyCODONE    IR 10 milliGRAM(s) Oral every 4 hours PRN Moderate Pain (4 - 6)  senna 2 Tablet(s) Oral at bedtime PRN Constipation      Vital Signs Last 24 Hrs  T(C): 36.6 (19 Dec 2018 08:07), Max: 37.2 (18 Dec 2018 15:00)  T(F): 97.8 (19 Dec 2018 08:07), Max: 98.9 (18 Dec 2018 15:00)  HR: 87 (19 Dec 2018 08:07) (79 - 89)  BP: 140/62 (19 Dec 2018 08:07) (127/56 - 140/62)  BP(mean): 79 (18 Dec 2018 15:00) (79 - 79)  RR: 16 (19 Dec 2018 08:07) (16 - 16)  SpO2: 96% (19 Dec 2018 08:07) (94% - 96%)      Physical Exam:   Mental Status - Patient is alert, awake, oriented X3. Speech is fluent. Mood and affect  normal.  No dyspnea during conversation.   Motor Exam -   4+/5 bilat shoulders, 5/5 elbow and hand grasps bilat, full AROM bilat UE  4/5 right hip flexor, 5/5 knee and ankle strengths   3/5 left hip flexor 2/2 pain, 4/5 left knee extender, 5/5 left ankle strengths   Sensory    Intact to light touch bilaterally.                            GENERAL Exam:     Nontoxic , No Acute Distress 	  HEENT:  normocephalic, atraumatic		  LUNGS:	Clear bilaterally	  HEART:	 Normal S1S2 	  GI/ ABDOMEN:  Soft  Non tender +BS  EXTREMITIES:   trace edema left LE  MUSCULOSKELETAL: as above   SKIN:     aquacel dressing to left hip c/d/i

## 2018-12-19 NOTE — DISCHARGE NOTE ADULT - MEDICATION SUMMARY - MEDICATIONS TO TAKE
I will START or STAY ON the medications listed below when I get home from the hospital:    acetaminophen 10 mg/mL intravenous solution  -- 100 milliliter(s) intravenous once  -- Indication: For Pain    acetaminophen 325 mg oral tablet  -- 2 tab(s) by mouth every 6 hours, As needed, Mild Pain (1 - 3)  -- Indication: For Pain    oxyCODONE 5 mg oral tablet  -- 1 tab(s) by mouth every 4 hours, As needed, Mild Pain (1 - 3)  -- Indication: For Pain    oxyCODONE 10 mg oral tablet  -- 1 tab(s) by mouth every 4 hours, As needed, Moderate Pain (4 - 6)  -- Indication: For Pain    valsartan 160 mg oral tablet  -- 1 tab(s) by mouth once a day  -- Indication: For HTN (hypertension)    apixaban 5 mg oral tablet  -- 2 tab(s) by mouth every 12 hours  -- Indication: For Pe/dvt    apixaban 5 mg oral tablet  -- 1 tab(s) by mouth every 12 hours  -- Indication: For PE DVT    metFORMIN 500 mg oral tablet, extended release  -- 1 tab(s) by mouth once a day  -- Indication: For DM    lovastatin 20 mg oral tablet  -- 1 tab(s) by mouth once a day (at bedtime)  -- Indication: For HLD    ALPRAZolam 0.25 mg oral tablet  -- orally once a day, As Needed  -- Indication: For Anxiety    Norvasc 5 mg oral tablet  -- 1 tab(s) by mouth once a day  -- Indication: For HTN (hypertension)    Protonix 40 mg oral delayed release tablet  -- 1 tab(s) by mouth once a day  -- Indication: For GERD

## 2018-12-19 NOTE — DISCHARGE NOTE ADULT - HOSPITAL COURSE
Pt is a 85 y/o Female from home with PMH HTN, DMII, CKD stage IV. HLD, presented with fall admitted for acute left hip fracture. Hospital course c/b bilat DVT , PE and post op anemia, patient s/p one unit prbc's and is now on therapeutic eliquis.      Closed fracture of left hip,   wbat LLE, thr precautions, pain management  physical therapy  Acute rehab        Anemia due to blood loss cbc stable  no signs of bleeding.     Deep vein thrombosis (DVT) of both lower extremities and  PE , unspecified vein.  continue eliquis.     Stage 4 chronic kidney disease. baseline creatinine 1.58 Pt is a 87 y/o Female from home with PMH HTN, DMII, CKD stage IV. HLD, presented with fall admitted for acute left hip fracture. Hospital course c/b bilat DVT , PE and post op anemia, patient s/p one unit prbc's and is now on therapeutic eliquis.        Closed fracture of left hip,   Underwent  left hip fx/left hemiarthroplasty w no complications.   Surgery followed daily.PT consult: wbat LLE, thr precautions.  Pain controlled.  Physical therapy rec acute rehab. Discharged to acute rehab on POD #7          Anemia due to blood loss :  Received 1 iu PRBC. Hgb has remained stable     Deep vein thrombosis (DVT) of both lower extremities and  PE :  Cont therapeutic eliquis     Stage 4 chronic kidney disease. baseline creatinine 1.58  Stable--creatnine at discharge 1.53    Dispo: Discharged to acute rrehab on POD #7

## 2018-12-19 NOTE — DISCHARGE NOTE ADULT - SECONDARY DIAGNOSIS.
Mediastinal mass Pure hypercholesterolemia Stage 4 chronic kidney disease Anemia due to blood loss Deep vein thrombosis (DVT) of both lower extremities, unspecified chronicity, unspecified vein Essential hypertension

## 2018-12-19 NOTE — DISCHARGE NOTE ADULT - PATIENT PORTAL LINK FT
You can access the [facility] Patient Portal, offered by [facility], by registering with the following website: [email or web address]

## 2018-12-19 NOTE — PROGRESS NOTE ADULT - SUBJECTIVE AND OBJECTIVE BOX
Surgery PA Note:  POD#6     S/P Left Hip Hemiarthroplasty     Patient was seen and examined by me.  Patient is feeling better today w/o complaints  her surgical pain is improved 2/10.    Vital Signs Last 24 Hrs  T(C): 36.6 (19 Dec 2018 08:07), Max: 37.2 (18 Dec 2018 15:00)  T(F): 97.8 (19 Dec 2018 08:07), Max: 98.9 (18 Dec 2018 15:00)  HR: 87 (19 Dec 2018 08:07) (79 - 89)  BP: 140/62 (19 Dec 2018 08:07) (127/56 - 140/62)  BP(mean): 79 (18 Dec 2018 15:00) (79 - 79)  RR: 16 (19 Dec 2018 08:07) (16 - 16)  SpO2: 96% (19 Dec 2018 08:07) (94% - 96%)    PE:    Alert and oriented x2  Lungs:  CTA   Cor: S1S2    Abd:  soft, non tender, +BS + BM yesterday           voiding  Ext:   Left Hip Aquacel dressing clean and dry,, thigh soft,           + neurovascular intact denies calf pain today     SCD,s bilateral when in bed abduction pillow for total hip precautions                          9.4    7.7   )-----------( 218      ( 19 Dec 2018 05:45 )             28.1   12-19    141  |  109<H>  |  30<H>  ----------------------------<  148<H>  4.1   |  21<L>  |  1.53<H>    Ca    8.4      19 Dec 2018 05:45    TPro  5.9<L>  /  Alb  2.3<L>  /  TBili  0.6  /  DBili  x   /  AST  14  /  ALT  18  /  AlkPhos  45  12-18

## 2018-12-19 NOTE — PROGRESS NOTE ADULT - PROBLEM SELECTOR PLAN 1
POD #5, ortho following  wbat LLE, thr precautions, pain management  pt following  Acute vs sub acute  PM&R eval

## 2018-12-19 NOTE — PROGRESS NOTE ADULT - ASSESSMENT
Pt is a 85 y/o Female from home with PMH HTN, DMII, CKD stage IV. HLD, presented with fall admitted for acute left hip fracture. POD #5. Hospital course c/b bilat DVT , PE and post op anemia, patient s/p one unit prbc's and is now on therapeutic eliquis.

## 2018-12-19 NOTE — PROGRESS NOTE ADULT - ASSESSMENT
IMP:  surgically stable           Bilateral DVT /PE           anxiety           Diabetes          renal insufficiency     Plan:  PT FWB Left leg            Total hip precautions           Eliquis for DVT s and PE            Acute rehab pending

## 2018-12-20 ENCOUNTER — INPATIENT (INPATIENT)
Facility: HOSPITAL | Age: 83
LOS: 14 days | Discharge: HOME HEALTH | DRG: 949 | End: 2019-01-04
Attending: PHYSICAL MEDICINE & REHABILITATION | Admitting: PHYSICAL MEDICINE & REHABILITATION
Payer: COMMERCIAL

## 2018-12-20 VITALS
DIASTOLIC BLOOD PRESSURE: 70 MMHG | OXYGEN SATURATION: 98 % | RESPIRATION RATE: 14 BRPM | HEART RATE: 84 BPM | SYSTOLIC BLOOD PRESSURE: 130 MMHG | TEMPERATURE: 98 F

## 2018-12-20 VITALS
HEART RATE: 83 BPM | TEMPERATURE: 98 F | OXYGEN SATURATION: 98 % | RESPIRATION RATE: 13 BRPM | DIASTOLIC BLOOD PRESSURE: 63 MMHG | HEIGHT: 60.2 IN | SYSTOLIC BLOOD PRESSURE: 114 MMHG | WEIGHT: 144.4 LBS

## 2018-12-20 DIAGNOSIS — I26.99 OTHER PULMONARY EMBOLISM WITHOUT ACUTE COR PULMONALE: ICD-10-CM

## 2018-12-20 DIAGNOSIS — I10 ESSENTIAL (PRIMARY) HYPERTENSION: ICD-10-CM

## 2018-12-20 DIAGNOSIS — S72.90XA UNSPECIFIED FRACTURE OF UNSPECIFIED FEMUR, INITIAL ENCOUNTER FOR CLOSED FRACTURE: ICD-10-CM

## 2018-12-20 DIAGNOSIS — S72.146A NONDISPLACED INTERTROCHANTERIC FRACTURE OF UNSPECIFIED FEMUR, INITIAL ENCOUNTER FOR CLOSED FRACTURE: ICD-10-CM

## 2018-12-20 DIAGNOSIS — I82.409 ACUTE EMBOLISM AND THROMBOSIS OF UNSPECIFIED DEEP VEINS OF UNSPECIFIED LOWER EXTREMITY: ICD-10-CM

## 2018-12-20 DIAGNOSIS — E11.9 TYPE 2 DIABETES MELLITUS WITHOUT COMPLICATIONS: ICD-10-CM

## 2018-12-20 DIAGNOSIS — Z90.49 ACQUIRED ABSENCE OF OTHER SPECIFIED PARTS OF DIGESTIVE TRACT: Chronic | ICD-10-CM

## 2018-12-20 DIAGNOSIS — F41.9 ANXIETY DISORDER, UNSPECIFIED: ICD-10-CM

## 2018-12-20 DIAGNOSIS — E78.00 PURE HYPERCHOLESTEROLEMIA, UNSPECIFIED: ICD-10-CM

## 2018-12-20 LAB
GLUCOSE BLDC GLUCOMTR-MCNC: 186 MG/DL — HIGH (ref 70–99)
GLUCOSE BLDC GLUCOMTR-MCNC: 204 MG/DL — HIGH (ref 70–99)
SURGICAL PATHOLOGY STUDY: SIGNIFICANT CHANGE UP

## 2018-12-20 PROCEDURE — A9567: CPT

## 2018-12-20 PROCEDURE — 76775 US EXAM ABDO BACK WALL LIM: CPT

## 2018-12-20 PROCEDURE — 88305 TISSUE EXAM BY PATHOLOGIST: CPT

## 2018-12-20 PROCEDURE — 82962 GLUCOSE BLOOD TEST: CPT

## 2018-12-20 PROCEDURE — 85610 PROTHROMBIN TIME: CPT

## 2018-12-20 PROCEDURE — 93970 EXTREMITY STUDY: CPT

## 2018-12-20 PROCEDURE — P9016: CPT

## 2018-12-20 PROCEDURE — C1889: CPT

## 2018-12-20 PROCEDURE — 97110 THERAPEUTIC EXERCISES: CPT

## 2018-12-20 PROCEDURE — 97535 SELF CARE MNGMENT TRAINING: CPT

## 2018-12-20 PROCEDURE — 76770 US EXAM ABDO BACK WALL COMP: CPT

## 2018-12-20 PROCEDURE — 86900 BLOOD TYPING SEROLOGIC ABO: CPT

## 2018-12-20 PROCEDURE — 97166 OT EVAL MOD COMPLEX 45 MIN: CPT

## 2018-12-20 PROCEDURE — 73502 X-RAY EXAM HIP UNI 2-3 VIEWS: CPT

## 2018-12-20 PROCEDURE — 71046 X-RAY EXAM CHEST 2 VIEWS: CPT

## 2018-12-20 PROCEDURE — 97162 PT EVAL MOD COMPLEX 30 MIN: CPT

## 2018-12-20 PROCEDURE — 71250 CT THORAX DX C-: CPT

## 2018-12-20 PROCEDURE — 86850 RBC ANTIBODY SCREEN: CPT

## 2018-12-20 PROCEDURE — 85027 COMPLETE CBC AUTOMATED: CPT

## 2018-12-20 PROCEDURE — 36430 TRANSFUSION BLD/BLD COMPNT: CPT

## 2018-12-20 PROCEDURE — C1713: CPT

## 2018-12-20 PROCEDURE — 80048 BASIC METABOLIC PNL TOTAL CA: CPT

## 2018-12-20 PROCEDURE — 85730 THROMBOPLASTIN TIME PARTIAL: CPT

## 2018-12-20 PROCEDURE — 99285 EMERGENCY DEPT VISIT HI MDM: CPT | Mod: 25

## 2018-12-20 PROCEDURE — 80053 COMPREHEN METABOLIC PANEL: CPT

## 2018-12-20 PROCEDURE — 88311 DECALCIFY TISSUE: CPT

## 2018-12-20 PROCEDURE — 72190 X-RAY EXAM OF PELVIS: CPT

## 2018-12-20 PROCEDURE — 71045 X-RAY EXAM CHEST 1 VIEW: CPT

## 2018-12-20 PROCEDURE — 97116 GAIT TRAINING THERAPY: CPT

## 2018-12-20 PROCEDURE — C1776: CPT

## 2018-12-20 PROCEDURE — A9540: CPT

## 2018-12-20 PROCEDURE — 36000 PLACE NEEDLE IN VEIN: CPT

## 2018-12-20 PROCEDURE — 78582 LUNG VENTILAT&PERFUS IMAGING: CPT

## 2018-12-20 PROCEDURE — 99233 SBSQ HOSP IP/OBS HIGH 50: CPT

## 2018-12-20 PROCEDURE — 86923 COMPATIBILITY TEST ELECTRIC: CPT

## 2018-12-20 PROCEDURE — 96374 THER/PROPH/DIAG INJ IV PUSH: CPT

## 2018-12-20 PROCEDURE — 86901 BLOOD TYPING SEROLOGIC RH(D): CPT

## 2018-12-20 PROCEDURE — 83036 HEMOGLOBIN GLYCOSYLATED A1C: CPT

## 2018-12-20 PROCEDURE — 93005 ELECTROCARDIOGRAM TRACING: CPT

## 2018-12-20 RX ORDER — ASCORBIC ACID 60 MG
500 TABLET,CHEWABLE ORAL
Qty: 0 | Refills: 0 | Status: DISCONTINUED | OUTPATIENT
Start: 2018-12-20 | End: 2019-01-04

## 2018-12-20 RX ORDER — SODIUM CHLORIDE 9 MG/ML
1000 INJECTION, SOLUTION INTRAVENOUS
Qty: 0 | Refills: 0 | Status: DISCONTINUED | OUTPATIENT
Start: 2018-12-20 | End: 2019-01-04

## 2018-12-20 RX ORDER — GLUCAGON INJECTION, SOLUTION 0.5 MG/.1ML
1 INJECTION, SOLUTION SUBCUTANEOUS ONCE
Qty: 0 | Refills: 0 | Status: DISCONTINUED | OUTPATIENT
Start: 2018-12-20 | End: 2019-01-04

## 2018-12-20 RX ORDER — PANTOPRAZOLE SODIUM 20 MG/1
40 TABLET, DELAYED RELEASE ORAL
Qty: 0 | Refills: 0 | Status: DISCONTINUED | OUTPATIENT
Start: 2018-12-21 | End: 2019-01-04

## 2018-12-20 RX ORDER — OXYCODONE HYDROCHLORIDE 5 MG/1
5 TABLET ORAL EVERY 4 HOURS
Qty: 0 | Refills: 0 | Status: DISCONTINUED | OUTPATIENT
Start: 2018-12-20 | End: 2018-12-26

## 2018-12-20 RX ORDER — INSULIN LISPRO 100/ML
VIAL (ML) SUBCUTANEOUS
Qty: 0 | Refills: 0 | Status: DISCONTINUED | OUTPATIENT
Start: 2018-12-20 | End: 2018-12-24

## 2018-12-20 RX ORDER — DEXTROSE 50 % IN WATER 50 %
25 SYRINGE (ML) INTRAVENOUS ONCE
Qty: 0 | Refills: 0 | Status: DISCONTINUED | OUTPATIENT
Start: 2018-12-20 | End: 2019-01-04

## 2018-12-20 RX ORDER — APIXABAN 2.5 MG/1
5 TABLET, FILM COATED ORAL EVERY 12 HOURS
Qty: 0 | Refills: 0 | Status: DISCONTINUED | OUTPATIENT
Start: 2018-12-23 | End: 2019-01-04

## 2018-12-20 RX ORDER — ALPRAZOLAM 0.25 MG
0.25 TABLET ORAL
Qty: 0 | Refills: 0 | Status: DISCONTINUED | OUTPATIENT
Start: 2018-12-21 | End: 2018-12-28

## 2018-12-20 RX ORDER — SENNA PLUS 8.6 MG/1
2 TABLET ORAL AT BEDTIME
Qty: 0 | Refills: 0 | Status: DISCONTINUED | OUTPATIENT
Start: 2018-12-20 | End: 2019-01-04

## 2018-12-20 RX ORDER — POLYETHYLENE GLYCOL 3350 17 G/17G
17 POWDER, FOR SOLUTION ORAL DAILY
Qty: 0 | Refills: 0 | Status: DISCONTINUED | OUTPATIENT
Start: 2018-12-21 | End: 2019-01-04

## 2018-12-20 RX ORDER — ATORVASTATIN CALCIUM 80 MG/1
10 TABLET, FILM COATED ORAL AT BEDTIME
Qty: 0 | Refills: 0 | Status: DISCONTINUED | OUTPATIENT
Start: 2018-12-20 | End: 2019-01-04

## 2018-12-20 RX ORDER — OXYCODONE HYDROCHLORIDE 5 MG/1
10 TABLET ORAL EVERY 4 HOURS
Qty: 0 | Refills: 0 | Status: DISCONTINUED | OUTPATIENT
Start: 2018-12-20 | End: 2018-12-27

## 2018-12-20 RX ORDER — ACETAMINOPHEN 500 MG
650 TABLET ORAL EVERY 6 HOURS
Qty: 0 | Refills: 0 | Status: DISCONTINUED | OUTPATIENT
Start: 2018-12-20 | End: 2019-01-04

## 2018-12-20 RX ORDER — AMLODIPINE BESYLATE 2.5 MG/1
5 TABLET ORAL DAILY
Qty: 0 | Refills: 0 | Status: DISCONTINUED | OUTPATIENT
Start: 2018-12-21 | End: 2018-12-28

## 2018-12-20 RX ORDER — DEXTROSE 50 % IN WATER 50 %
15 SYRINGE (ML) INTRAVENOUS ONCE
Qty: 0 | Refills: 0 | Status: DISCONTINUED | OUTPATIENT
Start: 2018-12-20 | End: 2019-01-04

## 2018-12-20 RX ORDER — DEXTROSE 50 % IN WATER 50 %
12.5 SYRINGE (ML) INTRAVENOUS ONCE
Qty: 0 | Refills: 0 | Status: DISCONTINUED | OUTPATIENT
Start: 2018-12-20 | End: 2019-01-04

## 2018-12-20 RX ORDER — ZINC SULFATE TAB 220 MG (50 MG ZINC EQUIVALENT) 220 (50 ZN) MG
220 TAB ORAL DAILY
Qty: 0 | Refills: 0 | Status: COMPLETED | OUTPATIENT
Start: 2018-12-21 | End: 2018-12-30

## 2018-12-20 RX ORDER — APIXABAN 2.5 MG/1
10 TABLET, FILM COATED ORAL EVERY 12 HOURS
Qty: 0 | Refills: 0 | Status: COMPLETED | OUTPATIENT
Start: 2018-12-20 | End: 2018-12-22

## 2018-12-20 RX ORDER — DOCUSATE SODIUM 100 MG
100 CAPSULE ORAL THREE TIMES A DAY
Qty: 0 | Refills: 0 | Status: DISCONTINUED | OUTPATIENT
Start: 2018-12-20 | End: 2018-12-26

## 2018-12-20 RX ORDER — LIDOCAINE 4 G/100G
2 CREAM TOPICAL
Qty: 0 | Refills: 0 | Status: DISCONTINUED | OUTPATIENT
Start: 2018-12-21 | End: 2019-01-04

## 2018-12-20 RX ADMIN — Medication 100 MILLIGRAM(S): at 14:05

## 2018-12-20 RX ADMIN — Medication 100 MILLIGRAM(S): at 06:14

## 2018-12-20 RX ADMIN — APIXABAN 10 MILLIGRAM(S): 2.5 TABLET, FILM COATED ORAL at 06:14

## 2018-12-20 RX ADMIN — Medication 2: at 11:41

## 2018-12-20 RX ADMIN — Medication 100 MILLIGRAM(S): at 21:22

## 2018-12-20 RX ADMIN — SENNA PLUS 2 TABLET(S): 8.6 TABLET ORAL at 21:22

## 2018-12-20 RX ADMIN — Medication 1: at 07:56

## 2018-12-20 RX ADMIN — ATORVASTATIN CALCIUM 10 MILLIGRAM(S): 80 TABLET, FILM COATED ORAL at 21:22

## 2018-12-20 RX ADMIN — Medication 500 MILLIGRAM(S): at 17:31

## 2018-12-20 RX ADMIN — APIXABAN 10 MILLIGRAM(S): 2.5 TABLET, FILM COATED ORAL at 17:31

## 2018-12-20 RX ADMIN — Medication 0.25 MILLIGRAM(S): at 11:19

## 2018-12-20 RX ADMIN — Medication 1: at 17:31

## 2018-12-20 RX ADMIN — PANTOPRAZOLE SODIUM 40 MILLIGRAM(S): 20 TABLET, DELAYED RELEASE ORAL at 06:15

## 2018-12-20 RX ADMIN — AMLODIPINE BESYLATE 5 MILLIGRAM(S): 2.5 TABLET ORAL at 06:14

## 2018-12-20 NOTE — H&P ADULT - PROBLEM SELECTOR PLAN 1
s/p hemiarthroplsty with functional deficits  Start comprehensive PT and OT program   MVI, zinc and vit c for healing  Oxycodone [id number] prn, tylenol prn, ice packs prn, and lidocaine patches for pain management s/p hemiarthroplasty with functional deficits  Start comprehensive PT and OT program   MVI, zinc and vit c for healing  Oxycodone [id number] prn, tylenol prn, ice packs prn, and lidocaine patches for pain management

## 2018-12-20 NOTE — PROGRESS NOTE ADULT - SUBJECTIVE AND OBJECTIVE BOX
STATUS POST: left hip fx/left hemiarthroplasty    POST OPERATIVE DAY: #7     Vital Signs Last 24 Hrs  T(C): 36.7 (20 Dec 2018 08:33), Max: 36.9 (19 Dec 2018 20:46)  T(F): 98 (20 Dec 2018 08:33), Max: 98.5 (20 Dec 2018 04:54)  HR: 82 (20 Dec 2018 08:33) (77 - 85)  BP: 139/66 (20 Dec 2018 08:33) (126/53 - 139/66)  BP(mean): --  RR: 14 (20 Dec 2018 08:33) (14 - 16)  SpO2: 97% (20 Dec 2018 08:33) (95% - 97%)      SUBJECTIVE: Pt seen sitting up in bed eating breakfast; offers no complaints; denies CP/SOB; awaiting Acute Rehab transfer    General Appearance: smiling w nasal O2 in place, cooperative in NAD  Chest: equal breath sounds, clear bilat  CV: regular S1S2 @ 80 presently  Abdomen: +BS, soft, non-tender  Extremities: LLE hip w Aquacell C&D; trace left thigh edema; no calf or thigh tenderness; pulses +2, sensation intact lt touch; DF/PF intact BLE    MEDICATIONS: noted    LABS: stable  Accuchecks 138-228                        9.4    7.7   )-----------( 218      ( 19 Dec 2018 05:45 )             28.1     12-19    141  |  109<H>  |  30<H>  ----------------------------<  148<H>  4.1   |  21<L>  |  1.53<H>    Ca    8.4      19 Dec 2018 05:45    A: s/p left hip fx/HA POD#7-stable      PE/bilat DVT on Eliquis (therapeutic)      HTN, HLD, CKD, DM II  P: Transfer Acute Rehab when bed available      WBAT on LLE, THR precautions      Medical f/u STATUS POST: left hip fx/left hemiarthroplasty    POST OPERATIVE DAY: #7     Vital Signs Last 24 Hrs  T(C): 36.7 (20 Dec 2018 08:33), Max: 36.9 (19 Dec 2018 20:46)  T(F): 98 (20 Dec 2018 08:33), Max: 98.5 (20 Dec 2018 04:54)  HR: 82 (20 Dec 2018 08:33) (77 - 85)  BP: 139/66 (20 Dec 2018 08:33) (126/53 - 139/66)  BP(mean): --  RR: 14 (20 Dec 2018 08:33) (14 - 16)  SpO2: 97% (20 Dec 2018 08:33) (95% - 97%)      SUBJECTIVE: Pt seen sitting up in bed eating breakfast; offers no complaints; denies CP/SOB; awaiting Acute Rehab transfer    General Appearance: smiling w nasal O2 in place, cooperative in NAD  Chest: equal breath sounds, clear bilat  CV: regular S1S2 @ 80 presently  Abdomen: +BS, soft, non-tender  Extremities: LLE hip w Aquacell C&D; trace left thigh edema; no calf or thigh tenderness; pulses +2, sensation intact lt touch; DF/PF intact BLE    MEDICATIONS: noted    LABS: stable  Accuchecks 138-228                        9.4    7.7   )-----------( 218      ( 19 Dec 2018 05:45 )             28.1     12-19    141  |  109<H>  |  30<H>  ----------------------------<  148<H>  4.1   |  21<L>  |  1.53<H>    Ca    8.4      19 Dec 2018 05:45    A: s/p left hip fx/HA POD#7-stable      PE/bilat DVT on Eliquis (therapeutic)      Anemia (acute blood loss s/p PRBC      HTN, HLD, CKD, DM II  P: Transfer Acute Rehab when bed available      WBAT on LLE, THR precautions      Medical f/u

## 2018-12-20 NOTE — PROGRESS NOTE ADULT - PROBLEM SELECTOR PLAN 6
PASTORA on CKD-Baseline creat 1.58-PASTORA improved--Creat at discharge 1.53  Renal sono done with right renal cyst.  ARB and Metformin on hold  Avoid nephrotoxins.

## 2018-12-20 NOTE — PROGRESS NOTE ADULT - PROBLEM SELECTOR PLAN 2
-POD #2  -Hgb today 8.3 (9.1 yesterday)-pt asymptomatic   Continue to closely monitor H/H--transfuse if Hgb < 8
Bilateral. On Eliquis for VQ scan today per Medicine. manage per medical team
-POD #3  -Hgb today 7.6 (8.3 yesterday)  -transfuse today 1iu PRBC  -monitor post tranfusion CBC
cbc stable  no signs of bleeding
s/p prbc's  cbc stable  no signs of bleeding
s/p prbc's  cbc stable  no signs of bleeding
on therapeutic eliquis-rec lifelong tx

## 2018-12-20 NOTE — H&P ADULT - NSHPSOCIALHISTORY_GEN_ALL_CORE
Lives in  on first floor, son and daughter-in-law on second floor with grandchildren. No stairs to enter her living quarters. Independent prior to fall. No use of AD.

## 2018-12-20 NOTE — PROGRESS NOTE ADULT - REASON FOR ADMISSION
Fall, left hip pain

## 2018-12-20 NOTE — H&P ADULT - PROBLEM SELECTOR PLAN 2
Continue eliquis 10mg q12 for total of 7 days (ending on 12/22)  Start maintenance dose of 5mg q12 starting on 12/23  Vascular consult placed to evaluate for any further recs and for future recs  will consider hematology consult

## 2018-12-20 NOTE — PROGRESS NOTE ADULT - PROBLEM SELECTOR PLAN 1
POD #6,   ortho following  wbat LLE, thr precautions, pain management  pt following  PT rec acute rehab POD #7,   ortho following  wbat LLE, thr precautions, pain management  pt following  PT rec acute rehab

## 2018-12-20 NOTE — PROGRESS NOTE ADULT - PROBLEM SELECTOR PLAN 8
Seen on CXR  - appears stable from 2016, consulted [facility], needs outpt f/u at discharge, not causing any acute issues currently.

## 2018-12-20 NOTE — PROGRESS NOTE ADULT - PROBLEM SELECTOR PROBLEM 9
Essential hypertension
Type 2 diabetes mellitus without complication, without long-term current use of insulin
Pure hypercholesterolemia

## 2018-12-20 NOTE — H&P ADULT - ASSESSMENT
86 woman s/p left hip fracture treated with hemiarthroplasty on 12/13 with functional deficits consisting of gait and ADL impairments with decreased endurance and strength, and newly diagnosed bilat DVTs and PE.       Precautions:                  falls                                                    Diet:  CC    DVT Prophylaxis:         Eliquis                                                                 Medical Prognosis:  good    Prescreen Comparison: I have reviewed the prescreen information and I found no relevant changes between the preadmission  screening and my post admission evaluation.     Expected Therapy:   P.T.     1.5   hrs/day           O. T.   1.5   hrs/day           S.L.P.     na   hrs/day                    P&O          na                                         Excpected Frequency: 5 days/7 day period    Rehab Potential:       good             Estimated Disposition:      home with home services            ELOS:    14-18      days      Rationale For Inpatient Rehab Admission- Patient demonstrates the following:     [X] Medically appropriate for rehabilitation admission   [X] Has attainable rehab goals with an approrpriate discharge plan  [X] Has rehabilitation potential (expected to make significant improvement within a reasonable period of time)  [X] Requires close medical management by a rehab physician, rehab nursing care and comprehensive interdisciplinary team (including         PT, OT, SLP and/or prosthetics and orthotics)

## 2018-12-20 NOTE — PROGRESS NOTE ADULT - PROBLEM SELECTOR PROBLEM 2
Anemia due to blood loss
DVT (deep venous thrombosis)
Anemia due to blood loss
Deep vein thrombosis (DVT) of both lower extremities, unspecified chronicity, unspecified vein

## 2018-12-20 NOTE — PROGRESS NOTE ADULT - PROBLEM SELECTOR PROBLEM 1
Hip fracture
Hip fracture
Closed fracture of left hip, initial encounter
Closed fracture of left hip, initial encounter
Hip fracture
Closed fracture of left hip, initial encounter
Closed fracture of left hip, initial encounter

## 2018-12-20 NOTE — H&P ADULT - ATTENDING COMMENTS
Agree with above.  Pt is a 86 woman s/p left hip fracture treated with hemiarthroplasty on 12/13 with functional deficits consisting of gait and ADL impairments with decreased endurance and strength, and newly diagnosed bilat DVTs and PE.   Pt requires comprehensive rehab with physician management of comorbidities

## 2018-12-20 NOTE — PROGRESS NOTE ADULT - PROBLEM SELECTOR PLAN 5
[facility] [place]   [facility]       Patient: Rock ALEXANDRA Rausch Date: 2017     : 1958 Attending: Russell Kam MD   59 year old male      Chief Complaint: Epistaxis, LVAD, anticoagulation    History of Present Illness: Rock ALEXANDRA Rausch is a 59 year old male with a past medical history significant for Ischemic Cardiomyopathy s/p Heartmate II LVAD as 1-B, Chronic Systolic HF, s/p x3 PCI to LAD & x1 PCI to RCA (), DMII, HLD, HTN, AFib, bipolar disorder, and epistaxis with cauterization to left nare (10/2017) who was admitted from Heart Failure Clinic on 17 after reporting bloody nose approximately 1 week ago that lasted ~1 day.  Patient notes that the bleeding was from from the right nare, opposite of the site that was cauterized 8 weeks ago.  He states that this bleeding was much less severe and lasted much shorter in duration as compared to is previous bleed which is why he did not seek medical attention.  He has not had any further bleeding since then.  He denies hematochezia and melena. He does note dizziness and lightheadedness when standing and with ambulation initially - he reports that these symptoms improve when he walks around a bit.  He also notes decreased PIs on LVAD as well as intermittent decreased speed to 8800 which is the lowest he has ever seen.  He also reports some BLE swelling that he started recently after Norvasc was started.  He denies CP, SOB at rest, PND, orthopnea, palpitations as well as fever/chills, cough and sinus congestion. Patient was seen in Heart Failure Clinic on day of admission and his hemoglobin was noted to be 8.6 (has been 12) therefore, he was admitted for further evaluation.  Admission INR therapeutic at 2.6 (goal 2-3).  LDH on admission 170. He reports compliance with all medications and states that his driveline dressing is changed daily, he denies tenderness or bleeding/drainage at driveline  site.    Interval Events:  12/13: Resting in bed. Feels tired. Hgb decreased 7.8. No BM since admit. No s/s of bleeding.  12/14: Sitting up at side of bed. No bleeding (nose, no BM since admit) but Hgb continues to drop. RHC postponed until today due to scheduling conflict.  12/15: Sitting up in chair. Has been walking in halls frequently.  Still no BM. No overt bleeding from nares but states he intermittently tastes blood in the back of his throat at night. Hgb continues to trend down. Denies dizziness and CP.  12/16: feeling well, no dizziness, no epistaxis, occult neg, resume ASA/Coumadin today per CHERELLE ADAME. No SOB.       Review of Systems: Pertinent items are listed in HPI (history of present illness):  A comprehensive review of systems is otherwise negative.    Nutrition: PO 2 gram Na, 2000 cc Fluid Restriction    Medications/Infusions:  Scheduled:   • mupirocin  1 application Each Nare 2 times per day   • levothyroxine  50 mcg Oral QAM AC   • iron sucrose (VENOFER) injection/IVPB  200 mg Intravenous Daily   • sodium chloride (PF)  2 mL Injection 2 times per day   • carvedilol  6.25 mg Oral BID WC   • potassium chloride  20 mEq Oral Daily   • famotidine  20 mg Oral BID   • lamoTRIgine  75 mg Oral Daily   • ferrous sulfate  325 mg Oral Daily with breakfast   • cholecalciferol  2,000 Units Oral Daily   • torsemide  10 mg Oral Daily   • amiodarone  200 mg Oral Daily   • allopurinol  100 mg Oral BID   • atorvastatin  40 mg Oral Nightly       Continuous Infusions:        Rhythm: Normal sinus    Arrythmias: None    UNOS Status:  IB      Vital Last Value 24 Hour Range   Temperature 97.8 °F (36.6 °C) Temp  Min: 97.8 °F (36.6 °C)  Max: 98.4 °F (36.9 °C)   Pulse 66 Pulse  Min: 65  Max: 72   Respiratory 20 Resp  Min: 18  Max: 20   Blood Pressure 91/61 No Data Recorded   Pulse Oximetry 98 % SpO2  Min: 97 %  Max: 98 %     Vital Today Admitted   Weight 105.6 kg Weight: 105.7 kg   Height N/A Height: 5' 8\" (172.7 cm)    BMI (body mass index) N/A BMI (Calculated): 35.51       Hemodynamics:         Weight over the past 48 Hours:  Patient Vitals for the past 48 hrs:   Weight   12/15/17 0505 105.7 kg   12/16/17 0421 105.6 kg          Intake/Output:  I/O this shift:  In: 480 [P.O.:480]  Out: 450 [Urine:450]  I/O last 3 completed shifts:  In: 830 [P.O.:830]  Out: 1725 [Urine:1725]    Intake/Output Summary (Last 24 hours) at 12/16/17 1448  Last data filed at 12/16/17 1100   Gross per 24 hour   Intake              830 ml   Output             1175 ml   Net             -345 ml         Physical Assessment:    General:    No Acute distress and Obese, pleasant, appears stated age, well nourished   Incision:    n/a   EENT:    Normal PERRL   Oral Mucosa:    Moist   Neck:   Trachea midline   Lungs:     diminished breath sounds  anterior - bilateral   Cardiovascular:   VAD sounds, no JVD/HJR, nonpitting BLE edema   Abdomen:     Soft, nontender, distended   Pulses:   Nonpulsitile   Skin:   Driveline dressing: CDI, no tenderness or oozing   Neurologic:   Alert and oriented to person, place and time       Laboratory Results:  Lab Results   Component Value Date    WBC 6.4 12/16/2017    HGB 7.1 (L) 12/16/2017    HCT 23.5 (L) 12/16/2017     12/16/2017    BUN 23 (H) 12/16/2017    CREATININE 1.35 (H) 12/16/2017    SODIUM 144 12/16/2017    POTASSIUM 4.4 12/16/2017    CO2 28 12/16/2017    MG 2.1 04/11/2017    BILIRUBIN 0.8 04/10/2017    INR 1.9 12/16/2017    PTT 42 (H) 04/10/2017    LACTA 1.1 11/14/2016    ALKPT 81 04/10/2017    AST 22 04/10/2017    GPT 42 04/10/2017    ALBUMIN 3.6 04/10/2017    GLUCOSE 111 (H) 12/16/2017    TSH 17.760 (H) 12/12/2017     RHC 12/14/17 @ 105.6 kg  RA 12/14/10   RV 38/6/14   PA 35/16/23   PCWP 14/16/12   PVR 2.06 lenz  SVR 1687.2  CO/CI (Eleanor) 5.8/2.66  PA sat 50.4%  AO sat 95%    ECHO 12/13/17  S/p Heartmate II LVAD (11/16/2016) @ 9200 RPMS  Inflow velocity of 0.8 m/s; Outflow velocity of 1.0 m/s  Septum in  -Hgb A1C 5.6 Sept  -Cont ISS  -Metformin on hold. midline.  Aortic valve does not opens with each cardiac cycle. Mild aortic valve regurgitation.  Mildly increased right ventricular size. Decreased longitudianl and normal radial systolic function.  Compared to prior study dated 09/12/2017, aortic valve does not open.    ECHO 9/12/17  Heartmate II LVAD (11/16/2016).  Severely decreased left ventricular systolic function.  Inflow and outflow cannulae visualized; 0.8 m/s and 1.1 m/s respectively.  Septum is somewhat neutral-to-rightward positioned.  Aortic valve opens with each cardiac cycle. Mild aortic valve regurgitation.  Normal right ventricular systolic function.  No significant change since the prior study dated 08/08/2017.    RHC 9/12/17  RA 11/11/5 mmhg.  RV 36/0/8 mmhg.  PA 34/15/24 mmhg.   PCWP 15/18/15 mmhg.  Systemic BP  mmhg  HR 66 bpm  Cardiac output/cardiac index (thermodilution) 5.30 L/min/2.48 L/min/m2.  Cardiac output/cardiac index ( Assumed Eleanor) = 5.31  L/min//2.48 L/min/m2 .  Calculated PVR 1.69 lenz  Calculated SVR 19.77 lenz.  RVSWI 10 gm-m / m2  Sats.:   Ao 96%   PA 61.6%      Diagnosis/Plan:   S/P Heartmate II LVAD as BTT (RHETT Status 1-B) due to Ischemic Cardiomyopathy. Chronic Systolic HF. ACC/AHA Stage D, NYHA Function Class III.   Volume Status: Suspect Elevated Perfusion Status: Adequate with LVAD   -Continue current VAD speed 9200 RPM   -Recent Alarms: None per patient   -ECHO as aforementioned    -[facility] 12/14 as aforementioned    -Continue Coreg 6.25 mg BID   -Continue Torsemide 10 mg daily   -Resume ASA 81 mg daily    -Resume Coumadin daily dosing.  Warfarin Dosing per AHF team; goal INR 2.0-3.0     -Managed by [facility] as outpatient ([phone])    -Driveline dressing changes per protocol, daily   -Daily standing weights and strict I&O   -Low sodium & fluid restricted diet    Mild Pulmonary Hypertension   -[facility] 12/14/17: mPA 23 with wedge 12   -Stop Sildenafil 20 mg TID - no plans to restart     Anemia  / Epistaxis   -CBC daily   -Venofer 200 mg x5 days   -Occult stool -    -Continue ASA / AC as aforementioned   -Consider transfusion only if HGB <7 and symptomatic, avoid    -ENT following, recs noted.  Nasal ointment/saline spray    Hypertension, controlled   -Goal MAP 60-85   -Stop Norvasc as BLE edema noted shortly after started - swelling improved since stopped, no plans to restart   -Continue BB as aforementioned   -PRN Hydralazine for MAP >90 mmHg    Atrial Fibrillation   -Currently in SR   -Amio 200mg daily   -AC as aforementioned    CKD II   GFR Estimate, Non  (no units)   Date Value   12/16/2017 57    -Monitor   -Avoid nephrotoxic agents    Hypothyroidism   -Synthroid 50 daily, started this admission    Constipation   -PRN stool softeners / suppository    Bipolar   -Continue Lamictal    H/O Nicotine Abuse   -Nicotine and cotinine 12/13/17 nagative    DVT Prophylaxis: Holding for anemia / SCDs    Alexandra SWAIN  [phone]    I have interviewed and examined the patient, reviewed the pertinent diagnostic studies and medical data, and have participated in the development of the treatment plan with Alexandra SWAIN    My recommendation for 12/16/2017    CV exam: RAP= 8, + Periferal edema, pallor    LVAD: HM2 as BTT listed staus 1B   MAP 60-90   INR 2-3   ASA 81   Drive line appears clean and dry    ECHO noted  RHC noted (off revatio)    Thyroid function: start synthroid 50ug  TSH (mcUnits/mL)   Date Value   12/12/2017 17.760 (H)    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   Restart ASA and coumadin    CKD: Stage 2 (baseline)  GFR Estimate, Non  (no units)   Date Value   12/16/2017 57      Edema likely related to norvasc and thyroid  state Clyde Li MD

## 2018-12-20 NOTE — PROGRESS NOTE ADULT - PROBLEM SELECTOR PLAN 9
cont norvasc  stable
: -Hgb A1C 5.6 Sept  -Cont ISS  -Metformin on hold.
HGBA1C 5.6  resume metformin upon discharge  continue accuchecks
aic 5.6  metformin on hold  continue accuchecks with correction insulin
aic 5.6  metformin on hold  continue accuchecks with correction insulin
Cont statin    Dispo:  DVT ppx: Lovenox (renally dosed)  [facility] vs acute rehab

## 2018-12-20 NOTE — PROGRESS NOTE ADULT - PROBLEM SELECTOR PLAN 10
resolved--most likely dilutional    DVT ppx: eliquis  Dispo: discharged to acute rehab on 12/20/18
-Seen on CXR  - appears stable from 2016, consulted [facility], needs outpt f/u at discharge, not causing any acute issues currently.    DVT ppx: apixiban  Dispo: [facility] vs acute rehab pending medical stability
outpatient follow up with pulm
outpatient follow up with pulmonary
outpatient follow up with pulm

## 2018-12-20 NOTE — PROGRESS NOTE ADULT - ASSESSMENT
Pt is a 85 y/o Female from home with PMHx of HTN, DMII, CKD stage IV. HLD, presented with fall admitted for acute left hip fracture. POD #6. Hospital course c/b bilat DVT , PE -now on therapeutic eliquid.  Hospital course furher c/b post op anemia, patient s/p one unit prbc's. Hgb stable.  Patient medically stable for discharge to acute rehab facility. Pt is a 87 y/o Female from home with PMHx of HTN, DMII, CKD stage IV. HLD, presented with fall admitted for acute left hip fracture. POD #7. Hospital course c/b bilat DVT , PE -now on therapeutic eliquid.  Hospital course furher c/b post op anemia, patient s/p one unit prbc's. Hgb stable.  Patient medically stable for discharge to acute rehab facility.

## 2018-12-20 NOTE — H&P ADULT - PROBLEM SELECTOR PLAN 9
Stage 3 chronic kidney disease.  Plan: Baseline Cr is 1.5, continue to encourage hydration, hold ARB.

## 2018-12-20 NOTE — H&P ADULT - PROBLEM SELECTOR PLAN 8
Anterior mediastinal rim enhancing mass: appears stable from 2016, consulted [facility], needs outpt f/u at discharge, not causing any acute issues currently.

## 2018-12-20 NOTE — H&P ADULT - I WAS PHYSICALLY PRESENT FOR THE KEY PORTIONS OF THE EVALUATION AND MANAGEMENT (E/M) SERVICE PROVIDED.  I AGREE WITH THE ABOVE HISTORY, PHYSICAL, AND PLAN WHICH I HAVE REVIEWED AND EDITED WHERE APPROPRIATE
Testing  Blood work for allergy testing (especially mold) today       Check portal in one week for results or call [phone]       Contact me with questions or concerns       I will contact you if anything needs immediate attention.    [facility]    Treatment    Flonase (= fluticasone) nasal spray:  2 squirts each nostril twice a day   Remember to aim out toward your ear.   Needs to be used regularly 5-14 days for full effect.    Continue Motrin and Neti pot as needed.      Return 1-3 weeks           Statement Selected

## 2018-12-20 NOTE — H&P ADULT - NSHPREVIEWOFSYSTEMS_GEN_ALL_CORE
TODAY'S SUBJECTIVE & REVIEW OF SYMPTOMS:  Patient denies SOB at rest. MILD SOB with activity. Denies HA, dizziness,, CP, palpitations. Reports pain to left hip with movement, no pain at rest. Pain with movement tolerable.   Daughter-in-law at bedside     No ETOH, illicit drug use or tobacco use.    Any major surgery within past 100 days?    YES    Two or more falls within past one year?      NO    One fall with injury within past one year?   YES

## 2018-12-20 NOTE — H&P ADULT - PMH
Anxiety    DVT (deep venous thrombosis)    HTN (hypertension)    Pure hypercholesterolemia    Type 2 diabetes mellitus without complication, without long-term current use of insulin

## 2018-12-20 NOTE — PROGRESS NOTE ADULT - PROBLEM SELECTOR PROBLEM 6
Problem: Patient Care Overview  Goal: Plan of Care/Patient Progress Review  Outcome: No Change  Ax. Temps ranging from 99.2-98.2 this shift; other VSS.  Weight is down 10 gms tonight and Karen is tolerating her feedings at 8 mls/hr.  She had 3 hours this shift of calm alert while in chair sucking on pacifer  Rectal irrigation  provided 8 gms of stool out.  Voiding in good amounts.  Parents in better spirits tonight, laughing and less demanding.       Stage 4 chronic kidney disease

## 2018-12-20 NOTE — PROGRESS NOTE ADULT - ATTENDING COMMENTS
I have personally seen and examined patient on the above date.  I discussed the case with Mr. Jiang and I agree with findings and plan as detailed per note above, which I have amended where appropriate.    V/Q scan showed intermediate probability of pulmonary embolus.  Patient family has informed.  There is no change in medication as she is already on treatment dose of eliquis.  Patient does not complain from chest pain or increasing shortness of breath.  Patient with some baseline anxiety.    Recommend life time anticoagulation due to second event prior to this admission.
I have personally seen and examined patient on the above date.  I discussed the case with Ms. Acevedo  and I agree with findings and plan as detailed per note above, which I have amended where appropriate.    Eliquis 7 days treatment dose of 10mg bid and then 5mg bid.  Recommend life time anticoagulation.
I have personally seen and examined patient on the above date.  I discussed the case with Ms. Acevedo and I agree with findings and plan as detailed per note above, which I have amended where appropriate.      Recommend lifetime anticoagulation after 2 separate clotting episodes
I have personally seen and examined patient on the above date.  I discussed the case with MsSam Patty and I agree with findings and plan as detailed per note above, which I have amended where appropriate.      On examination patient RR was 24/minute.  Risk for PE but a PE would not .  Plan V/Q scan in the morning.
I have personally seen and examined patient on the above date.  I discussed the case with Ms. Brambila and I agree with findings and plan as detailed per note above, which I have amended where appropriate.
I have personally seen and examined patient on the above date.  I discussed the case with LOUISA Buck and I agree with findings and plan as detailed per note above, which I have amended where appropriate.      S/p acute left hemiarthroplasty for hip fracture  POD #2  OOB to chair  pain control  likely AVE placement

## 2018-12-20 NOTE — H&P ADULT - HISTORY OF PRESENT ILLNESS
Mrs. More is an 86 year old woman who fell on 12/13, noting left hip pain and presenting to [facility]. She was found to have a left hip fracture for which she underwent a hemiarthroplasty by Dr. Brenner. Post operatively, patient found to have bilat lower extremity DVTs and VQ scan Over the weekend, patient found to have bilat DVTs and VQ scan revealed intermediate probability of pulmonary embolus. Patient started on treatement dose Eliquis 10mg BID x7 days, then to decreased to 5mg BID there after.  Also s/p 1 unit PRBCs for anemia and Hen of 7.6. H/h improved and stable.   Patient still requiring min to mod A for bed mobility and min A for transfers and ambulation with RW. Requiring max  assist for LE dressing.   Acute IPR program recommended.   Patient cleared medically for admission to acute IPR here at [facility] on 12/20/18.

## 2018-12-20 NOTE — PROGRESS NOTE ADULT - PROVIDER SPECIALTY LIST ADULT
Hospitalist
Orthopedics
Rehab Medicine
Rehab Medicine
Surgery
Surgery
Hospitalist

## 2018-12-20 NOTE — H&P ADULT - NSHPPHYSICALEXAM_GEN_ALL_CORE
Mental Status - Patient is alert, awake, oriented X3. Speech is fluent. Mood and affect  normal.  No dyspnea during conversation.   Motor Exam -   4+/5 bilat shoulders, 5/5 elbow and hand grasps bilat, full AROM bilat UE  4/5 right hip flexor, 5/5 knee and ankle strengths   3/5 left hip flexor 2/2 pain, 4/5 left knee extender, 5/5 left ankle strengths   Sensory    Intact to light touch bilaterally.                            GENERAL Exam:     Nontoxic , No Acute Distress 	  HEENT:  normocephalic, atraumatic		  LUNGS:	Clear bilaterally	  HEART:	 Normal S1S2 	  GI/ ABDOMEN:  Soft  Non tender +BS  EXTREMITIES:   trace edema bilat LE  MUSCULOSKELETAL: as above   SKIN:     aquacel dressing to left hip c/d/i

## 2018-12-20 NOTE — H&P ADULT - PROBLEM SELECTOR PLAN 4
Contnue SSI and monitor accuchecks  metformin used at home, will consider restarting if kindey function improves

## 2018-12-20 NOTE — H&P ADULT - NSHPLABSRESULTS_GEN_ALL_CORE
12-19    141  |  109<H>  |  30<H>  ----------------------------<  148<H>  4.1   |  21<L>  |  1.53<H>    Ca    8.4      19 Dec 2018 05:45                          9.4    7.7   )-----------( 218      ( 19 Dec 2018 05:45 )             28.1   < from: NM Pulmonary Ventilation/Perfusion Scan (12.17.18 @ 12:28) >      EXAM:  NM PULM VENTILATION PERFUS IMG      PROCEDURE DATE:  12/17/2018        INTERPRETATION:  RADIOPHARMACEUTICAL: 1.0 mCi Tc-99m-DTPA via inhalation;   6.2 mCi Tc-99m-MAA, I.V.    CLINICAL INFORMATION: 86 year old female status post left hip   hemiarthroplasty presents with DVT and hypoxia; referred to evaluate for   pulmonary embolism.    TECHNIQUE:  Ventilation and perfusion images of the lungs were obtained   following administration of Tc-99m-DTPA and Tc-99m-MAA. Images were   obtained inthe anterior, posterior, both lateral, and all 4 oblique   projections. The study was interpreted in conjunction with a chest   radiograph of 12/17/2018.    COMPARISON: Lung scan performed on 11/23/2016 was reviewed.    FINDINGS: There is a large mismatched ventilation/perfusion defect in the   anterior segment of the right upper lobe and a moderate-sized mismatched   ventilation/perfusion defect in the posterior segment of the right upper   lobe. There are no corresponding chest x-ray opacities.These   abnormalities are new since the previous study of 11/23/2016. There is   heterogeneous distribution of radiopharmaceutical in the remainder of   both lungs on the ventilation and perfusion images.    IMPRESSION: Abnormal ventilation/perfusionlung scan. Intermediate   probability of pulmonary embolus.      < end of copied text > 12-19    141  |  109<H>  |  30<H>  ----------------------------<  148<H>  4.1   |  21<L>  |  1.53<H>    Ca    8.4      19 Dec 2018 05:45                          9.4    7.7   )-----------( 218      ( 19 Dec 2018 05:45 )             28.1                  < from: NM Pulmonary Ventilation/Perfusion Scan (12.17.18 @ 12:28) >      EXAM:  NM PULM VENTILATION PERFUS IMG      PROCEDURE DATE:  12/17/2018        INTERPRETATION:  RADIOPHARMACEUTICAL: 1.0 mCi Tc-99m-DTPA via inhalation;   6.2 mCi Tc-99m-MAA, I.V.    CLINICAL INFORMATION: 86 year old female status post left hip   hemiarthroplasty presents with DVT and hypoxia; referred to evaluate for   pulmonary embolism.    TECHNIQUE:  Ventilation and perfusion images of the lungs were obtained   following administration of Tc-99m-DTPA and Tc-99m-MAA. Images were   obtained inthe anterior, posterior, both lateral, and all 4 oblique   projections. The study was interpreted in conjunction with a chest   radiograph of 12/17/2018.    COMPARISON: Lung scan performed on 11/23/2016 was reviewed.    FINDINGS: There is a large mismatched ventilation/perfusion defect in the   anterior segment of the right upper lobe and a moderate-sized mismatched   ventilation/perfusion defect in the posterior segment of the right upper   lobe. There are no corresponding chest x-ray opacities.These   abnormalities are new since the previous study of 11/23/2016. There is   heterogeneous distribution of radiopharmaceutical in the remainder of   both lungs on the ventilation and perfusion images.    IMPRESSION: Abnormal ventilation/perfusion lung scan. Intermediate   probability of pulmonary embolus.      < end of copied text >

## 2018-12-20 NOTE — PROGRESS NOTE ADULT - SUBJECTIVE AND OBJECTIVE BOX
Patient is a 86y old  Female who presents with a chief complaint of Fall, left hip pain (20 Dec 2018 09:26). No acute events overnight.  Accepted to acute rehab.      Patient seen and examined at bedside.    ALLERGIES:  penicillin (Rash; Urticaria)    MEDICATIONS  (STANDING):  acetaminophen  IVPB .. 1000 milliGRAM(s) IV Intermittent once  ALPRAZolam 0.25 milliGRAM(s) Oral daily  amLODIPine   Tablet 5 milliGRAM(s) Oral daily  apixaban 10 milliGRAM(s) Oral every 12 hours  atorvastatin 10 milliGRAM(s) Oral at bedtime  dextrose 5%. 1000 milliLiter(s) (50 mL/Hr) IV Continuous <Continuous>  dextrose 50% Injectable 12.5 Gram(s) IV Push once  dextrose 50% Injectable 25 Gram(s) IV Push once  dextrose 50% Injectable 25 Gram(s) IV Push once  docusate sodium 100 milliGRAM(s) Oral three times a day  insulin lispro (HumaLOG) corrective regimen sliding scale   SubCutaneous three times a day before meals  pantoprazole    Tablet 40 milliGRAM(s) Oral before breakfast  polyethylene glycol 3350 17 Gram(s) Oral daily    MEDICATIONS  (PRN):  acetaminophen   Tablet .. 650 milliGRAM(s) Oral every 6 hours PRN Mild Pain (1 - 3)  aluminum hydroxide/magnesium hydroxide/simethicone Suspension 30 milliLiter(s) Oral four times a day PRN Indigestion  bisacodyl Suppository 10 milliGRAM(s) Rectal daily PRN If no bowel movement by POD#2  dextrose 40% Gel 15 Gram(s) Oral once PRN Blood Glucose LESS THAN 70 milliGRAM(s)/deciliter  glucagon  Injectable 1 milliGRAM(s) IntraMuscular once PRN Glucose LESS THAN 70 milligrams/deciliter  HYDROmorphone  Injectable 0.5 milliGRAM(s) IV Push every 3 hours PRN Severe Pain (7 - 10)  magnesium hydroxide Suspension 30 milliLiter(s) Oral at bedtime PRN Constipation  ondansetron Injectable 4 milliGRAM(s) IV Push every 6 hours PRN Nausea and/or Vomiting  oxyCODONE    IR 5 milliGRAM(s) Oral every 4 hours PRN Mild Pain (1 - 3)  oxyCODONE    IR 10 milliGRAM(s) Oral every 4 hours PRN Moderate Pain (4 - 6)  senna 2 Tablet(s) Oral at bedtime PRN Constipation    Vital Signs Last 24 Hrs  T(F): 98 (20 Dec 2018 08:33), Max: 98.5 (20 Dec 2018 04:54)  HR: 82 (20 Dec 2018 08:33) (77 - 85)  BP: 139/66 (20 Dec 2018 08:33) (126/53 - 139/66)  RR: 14 (20 Dec 2018 08:33) (14 - 16)  SpO2: 97% (20 Dec 2018 08:33) (95% - 97%)  I&O's Summary    19 Dec 2018 07:01  -  20 Dec 2018 07:00  --------------------------------------------------------  IN: 420 mL / OUT: 0 mL / NET: 420 mL    20 Dec 2018 07:01  -  20 Dec 2018 11:15  --------------------------------------------------------  IN: 300 mL / OUT: 0 mL / NET: 300 mL        PHYSICAL EXAM:  General: NAD, A/O x 3  ENT: MMM  Neck: Supple, No JVD  Lungs: Clear to auscultation bilaterally  Cardio: RRR, S1/S2,   Abdomen: Soft, Nontender, Nondistended; Bowel sounds present  Extremities: No calf tenderness, No pitting edema, sp L hip ORIF    LABS:                        9.4    7.7   )-----------( 218      ( 19 Dec 2018 05:45 )             28.1       12-19    141  |  109  |  30  ----------------------------<  148  4.1   |  21  |  1.53    Ca    8.4      19 Dec 2018 05:45    TPro  5.9  /  Alb  2.3  /  TBili  0.6  /  DBili  x   /  AST  14  /  ALT  18  /  AlkPhos  45  12-18     eGFR if Non African American: 31 mL/min/1.73M2 (12-19-18 @ 05:45)  eGFR if African American: 35 mL/min/1.73M2 (12-19-18 @ 05:45)                     CAPILLARY BLOOD GLUCOSE      POCT Blood Glucose.: 186 mg/dL (20 Dec 2018 07:42)  POCT Blood Glucose.: 194 mg/dL (19 Dec 2018 20:52)  POCT Blood Glucose.: 149 mg/dL (19 Dec 2018 16:51)  POCT Blood Glucose.: 228 mg/dL (19 Dec 2018 12:34)    12-15 [id number] 6.1  12-14 LazggosujiL1N 6.0          RADIOLOGY & ADDITIONAL TESTS:    Care Discussed with Consultants/Other Providers:

## 2018-12-21 DIAGNOSIS — N18.3 CHRONIC KIDNEY DISEASE, STAGE 3 (MODERATE): ICD-10-CM

## 2018-12-21 DIAGNOSIS — J98.59 OTHER DISEASES OF MEDIASTINUM, NOT ELSEWHERE CLASSIFIED: ICD-10-CM

## 2018-12-21 LAB
ALBUMIN SERPL ELPH-MCNC: 2.3 G/DL — LOW (ref 3.3–5)
ALP SERPL-CCNC: 46 U/L — SIGNIFICANT CHANGE UP (ref 40–120)
ALT FLD-CCNC: 20 U/L DA — SIGNIFICANT CHANGE UP (ref 10–45)
ANION GAP SERPL CALC-SCNC: 9 MMOL/L — SIGNIFICANT CHANGE UP (ref 5–17)
AST SERPL-CCNC: 17 U/L — SIGNIFICANT CHANGE UP (ref 10–40)
BASOPHILS # BLD AUTO: 0.1 K/UL — SIGNIFICANT CHANGE UP (ref 0–0.2)
BASOPHILS NFR BLD AUTO: 1.1 % — SIGNIFICANT CHANGE UP (ref 0–2)
BILIRUB SERPL-MCNC: 0.5 MG/DL — SIGNIFICANT CHANGE UP (ref 0.2–1.2)
BUN SERPL-MCNC: 26 MG/DL — HIGH (ref 7–23)
CALCIUM SERPL-MCNC: 9 MG/DL — SIGNIFICANT CHANGE UP (ref 8.4–10.5)
CHLORIDE SERPL-SCNC: 108 MMOL/L — SIGNIFICANT CHANGE UP (ref 96–108)
CO2 SERPL-SCNC: 26 MMOL/L — SIGNIFICANT CHANGE UP (ref 22–31)
CREAT SERPL-MCNC: 1.42 MG/DL — HIGH (ref 0.5–1.3)
EOSINOPHIL # BLD AUTO: 0.5 K/UL — SIGNIFICANT CHANGE UP (ref 0–0.5)
EOSINOPHIL NFR BLD AUTO: 6.4 % — HIGH (ref 0–6)
GLUCOSE SERPL-MCNC: 152 MG/DL — HIGH (ref 70–99)
HCT VFR BLD CALC: 28.8 % — LOW (ref 34.5–45)
HGB BLD-MCNC: 9.5 G/DL — LOW (ref 11.5–15.5)
LYMPHOCYTES # BLD AUTO: 1.5 K/UL — SIGNIFICANT CHANGE UP (ref 1–3.3)
LYMPHOCYTES # BLD AUTO: 19.4 % — SIGNIFICANT CHANGE UP (ref 13–44)
MCHC RBC-ENTMCNC: 30.7 PG — SIGNIFICANT CHANGE UP (ref 27–34)
MCHC RBC-ENTMCNC: 33 GM/DL — SIGNIFICANT CHANGE UP (ref 32–36)
MCV RBC AUTO: 92.9 FL — SIGNIFICANT CHANGE UP (ref 80–100)
MONOCYTES # BLD AUTO: 0.8 K/UL — SIGNIFICANT CHANGE UP (ref 0–0.9)
MONOCYTES NFR BLD AUTO: 10.2 % — SIGNIFICANT CHANGE UP (ref 2–14)
NEUTROPHILS # BLD AUTO: 4.8 K/UL — SIGNIFICANT CHANGE UP (ref 1.8–7.4)
NEUTROPHILS NFR BLD AUTO: 63 % — SIGNIFICANT CHANGE UP (ref 43–77)
PLATELET # BLD AUTO: 252 K/UL — SIGNIFICANT CHANGE UP (ref 150–400)
POTASSIUM SERPL-MCNC: 4.1 MMOL/L — SIGNIFICANT CHANGE UP (ref 3.5–5.3)
POTASSIUM SERPL-SCNC: 4.1 MMOL/L — SIGNIFICANT CHANGE UP (ref 3.5–5.3)
PROT SERPL-MCNC: 6 G/DL — SIGNIFICANT CHANGE UP (ref 6–8.3)
RBC # BLD: 3.1 M/UL — LOW (ref 3.8–5.2)
RBC # FLD: 12.6 % — SIGNIFICANT CHANGE UP (ref 10.3–14.5)
SODIUM SERPL-SCNC: 143 MMOL/L — SIGNIFICANT CHANGE UP (ref 135–145)
WBC # BLD: 7.7 K/UL — SIGNIFICANT CHANGE UP (ref 3.8–10.5)
WBC # FLD AUTO: 7.7 K/UL — SIGNIFICANT CHANGE UP (ref 3.8–10.5)

## 2018-12-21 PROCEDURE — 99223 1ST HOSP IP/OBS HIGH 75: CPT

## 2018-12-21 PROCEDURE — 99222 1ST HOSP IP/OBS MODERATE 55: CPT | Mod: AI

## 2018-12-21 RX ORDER — ALPRAZOLAM 0.25 MG
0.25 TABLET ORAL DAILY
Qty: 0 | Refills: 0 | Status: DISCONTINUED | OUTPATIENT
Start: 2018-12-21 | End: 2018-12-21

## 2018-12-21 RX ADMIN — Medication 1 TABLET(S): at 12:05

## 2018-12-21 RX ADMIN — Medication 500 MILLIGRAM(S): at 17:11

## 2018-12-21 RX ADMIN — ZINC SULFATE TAB 220 MG (50 MG ZINC EQUIVALENT) 220 MILLIGRAM(S): 220 (50 ZN) TAB at 12:05

## 2018-12-21 RX ADMIN — Medication 2: at 17:11

## 2018-12-21 RX ADMIN — OXYCODONE HYDROCHLORIDE 5 MILLIGRAM(S): 5 TABLET ORAL at 07:59

## 2018-12-21 RX ADMIN — APIXABAN 10 MILLIGRAM(S): 2.5 TABLET, FILM COATED ORAL at 05:26

## 2018-12-21 RX ADMIN — Medication 500 MILLIGRAM(S): at 05:24

## 2018-12-21 RX ADMIN — Medication 0.25 MILLIGRAM(S): at 07:59

## 2018-12-21 RX ADMIN — Medication 650 MILLIGRAM(S): at 17:11

## 2018-12-21 RX ADMIN — Medication 650 MILLIGRAM(S): at 18:16

## 2018-12-21 RX ADMIN — Medication 1: at 07:59

## 2018-12-21 RX ADMIN — LIDOCAINE 2 PATCH: 4 CREAM TOPICAL at 07:52

## 2018-12-21 RX ADMIN — OXYCODONE HYDROCHLORIDE 10 MILLIGRAM(S): 5 TABLET ORAL at 15:03

## 2018-12-21 RX ADMIN — APIXABAN 10 MILLIGRAM(S): 2.5 TABLET, FILM COATED ORAL at 17:11

## 2018-12-21 RX ADMIN — LIDOCAINE 2 PATCH: 4 CREAM TOPICAL at 17:13

## 2018-12-21 RX ADMIN — Medication 100 MILLIGRAM(S): at 21:17

## 2018-12-21 RX ADMIN — AMLODIPINE BESYLATE 5 MILLIGRAM(S): 2.5 TABLET ORAL at 05:25

## 2018-12-21 RX ADMIN — ATORVASTATIN CALCIUM 10 MILLIGRAM(S): 80 TABLET, FILM COATED ORAL at 21:17

## 2018-12-21 RX ADMIN — Medication 100 MILLIGRAM(S): at 05:25

## 2018-12-21 RX ADMIN — OXYCODONE HYDROCHLORIDE 10 MILLIGRAM(S): 5 TABLET ORAL at 15:44

## 2018-12-21 RX ADMIN — PANTOPRAZOLE SODIUM 40 MILLIGRAM(S): 20 TABLET, DELAYED RELEASE ORAL at 05:25

## 2018-12-21 RX ADMIN — Medication 100 MILLIGRAM(S): at 13:29

## 2018-12-21 RX ADMIN — LIDOCAINE 2 PATCH: 4 CREAM TOPICAL at 05:24

## 2018-12-21 RX ADMIN — SENNA PLUS 2 TABLET(S): 8.6 TABLET ORAL at 21:17

## 2018-12-21 RX ADMIN — OXYCODONE HYDROCHLORIDE 5 MILLIGRAM(S): 5 TABLET ORAL at 08:29

## 2018-12-21 NOTE — PROGRESS NOTE ADULT - SUBJECTIVE AND OBJECTIVE BOX
HISTORY OF PRESENT ILLNESS  Mrs. More is an 86 year old woman who fell on 12/13, noting left hip pain and presenting to [facility]. She was found to have a left hip fracture for which she underwent a hemiarthroplasty by Dr. Brenner. Post operatively, patient found to have bilat lower extremity DVTs and VQ scan Over the weekend, patient found to have bilat DVTs and VQ scan revealed intermediate probability of pulmonary embolus. Patient started on treatement dose Eliquis 10mg BID x7 days, then to decreased to 5mg BID there after.  Also s/p 1 unit PRBCs for anemia and Hen of 7.6. H/h improved and stable.   Patient still requiring min to mod A for bed mobility and min A for transfers and ambulation with RW. Requiring max  assist for LE dressing.   Acute IPR program recommended. Patient cleared medically for admission to acute IPR here at [facility] on 12/20/18      TODAY'S SUBJECTIVE & REVIEW OF SYMPTOMS  Seen and evaluated bedside.  Slept well overnight.  Having some mild left hip pain.  Has anxiety in the mornings chronically with some "heaviness" in the chest but denies new or different chest pain, SOB.  Denies dysuria.      [X] Constiutional WNL        [X] Cardio WNL              [X] Resp WNL  [X] GI WNL                            [X]  WNL                    [X] Heme WNL  [X] Endo WNL                       [X] Skin WNL                  [X] MSK WNL  [X] Neuro WNL                     [X] Cognitive WNL         [X] Psych WNL        VITALS  T(C): 36.8 (12-20-18 @ 20:20)  T(F): 98.2 (12-20-18 @ 20:20), Max: 98.2 (12-20-18 @ 14:15)  HR: 71 (12-21-18 @ 05:22) (71 - 84)  BP: 113/69 (12-21-18 @ 05:22) (99/57 - 130/70)  RR:  (13 - 14)  SpO2:  (95% - 98%)  Wt(kg): --    PHYSICAL EXAM  Constitutional - NAD, Comfortable  Chest - CTA bilaterally, No wheeze, No rhonchi, No crackles  Cardiovascular - RRR, S1S2, No murmurs  Abdomen - BS+, Soft, NTND  Extremities - No C/C/E, No calf tenderness   Neurologic Exam -                    Cognitive - Awake, Alert, AAO to self, place, date, year, situation     Communication - Fluent, No dysarthria     Motor - No focal deficits                    LEFT    UE - ShAB 5/5, EF 5/5, EE 5/5, WE 5/5,  5/5                    RIGHT UE - ShAB 5/5, EF 5/5, EE 5/5, WE 5/5,  5/5                    LEFT    LE - HF 5/5, KE 5/5, DF 5/5, PF 5/5                    RIGHT LE - HF 5/5, KE 5/5, DF 5/5, PF 5/5        Sensory - Intact to LT  Psychiatric - Mood stable, Affect WNL    FUNCTIONAL STATUS  Gait -   Transfers -   ADL's -   Functional Transfers -    RECENT LABS/IMAGING                        9.5    7.7   )-----------( 252      ( 21 Dec 2018 05:59 )             28.8     12-21    143  |  108  |  26<H>  ----------------------------<  152<H>  4.1   |  26  |  1.42<H>    Ca    9.0      21 Dec 2018 05:59    TPro  6.0  /  Alb  2.3<L>  /  TBili  0.5  /  DBili  x   /  AST  17  /  ALT  20  /  AlkPhos  46  12-21          RADIOLOGY/OTHER RESULTS  -----    MEDICATIONS   MEDICATIONS  (STANDING):  ALPRAZolam 0.25 milliGRAM(s) Oral <User Schedule>  amLODIPine   Tablet 5 milliGRAM(s) Oral daily  apixaban 10 milliGRAM(s) Oral every 12 hours  ascorbic acid 500 milliGRAM(s) Oral two times a day  atorvastatin 10 milliGRAM(s) Oral at bedtime  dextrose 5%. 1000 milliLiter(s) (50 mL/Hr) IV Continuous <Continuous>  dextrose 50% Injectable 12.5 Gram(s) IV Push once  dextrose 50% Injectable 25 Gram(s) IV Push once  dextrose 50% Injectable 25 Gram(s) IV Push once  docusate sodium 100 milliGRAM(s) Oral three times a day  insulin lispro (HumaLOG) corrective regimen sliding scale   SubCutaneous three times a day before meals  lidocaine   Patch 2 Patch Transdermal <User Schedule>  multivitamin 1 Tablet(s) Oral daily  pantoprazole    Tablet 40 milliGRAM(s) Oral before breakfast  senna 2 Tablet(s) Oral at bedtime  zinc sulfate 220 milliGRAM(s) Oral daily    MEDICATIONS  (PRN):  acetaminophen   Tablet .. 650 milliGRAM(s) Oral every 6 hours PRN Mild Pain (1 - 3)  dextrose 40% Gel 15 Gram(s) Oral once PRN Blood Glucose LESS THAN 70 milliGRAM(s)/deciliter  glucagon  Injectable 1 milliGRAM(s) IntraMuscular once PRN Glucose LESS THAN 70 milligrams/deciliter  oxyCODONE    IR 5 milliGRAM(s) Oral every 4 hours PRN Moderate Pain (4 - 6)  oxyCODONE    IR 10 milliGRAM(s) Oral every 4 hours PRN Severe Pain (7 - 10)  polyethylene glycol 3350 17 Gram(s) Oral daily PRN Constipation      ASSESSMENT/PLAN HISTORY OF PRESENT ILLNESS  Mrs. More is an 86 year old woman who fell on 12/13, noting left hip pain and presenting to [facility]. She was found to have a left hip fracture for which she underwent a hemiarthroplasty by Dr. Brenner. Post operatively, patient found to have bilat lower extremity DVTs and VQ scan Over the weekend, patient found to have bilat DVTs and VQ scan revealed intermediate probability of pulmonary embolus. Patient started on treatement dose Eliquis 10mg BID x7 days, then to decreased to 5mg BID there after.  Also s/p 1 unit PRBCs for anemia and Hen of 7.6. H/h improved and stable.   Patient still requiring min to mod A for bed mobility and min A for transfers and ambulation with RW. Requiring max  assist for LE dressing.   Acute IPR program recommended. Patient cleared medically for admission to acute IPR here at [facility] on 12/20/18      TODAY'S SUBJECTIVE & REVIEW OF SYMPTOMS  Seen and evaluated bedside.  Slept well overnight.  Having some mild left hip pain.  Has anxiety in the mornings chronically with some "heaviness" in the chest but denies new or different chest pain, SOB.  Denies dysuria.      [X] Constiutional WNL        [X] Cardio WNL              [X] Resp WNL  [X] GI WNL                            [X]  WNL                    [X] Heme WNL  [X] Endo WNL                       [X] Skin WNL                  [X] MSK WNL  [X] Neuro WNL                     [X] Cognitive WNL         [X] Psych WNL      VITALS  T(C): 36.8 (12-20-18 @ 20:20)  T(F): 98.2 (12-20-18 @ 20:20), Max: 98.2 (12-20-18 @ 14:15)  HR: 71 (12-21-18 @ 05:22) (71 - 84)  BP: 113/69 (12-21-18 @ 05:22) (99/57 - 130/70)  RR:  (13 - 14)  SpO2:  (95% - 98%)  Wt(kg): --    Physical Exam: Mental Status - Patient is alert, awake, oriented X3. Speech is fluent. Mood and affect  normal.  	No SOB during exam  	Motor Exam -   	4+/5 bilat shoulders, 5/5 elbow and hand grasps bilat, full AROM bilat UE  	4/5 right hip flexor, 5/5 knee and ankle strengths   	3/5 left hip flexor 2/2 pain, 4/5 left knee extender, 5/5 left ankle strengths   	Sensory    Intact to light touch bilaterally.                            	GENERAL Exam:  NAD 	  	HEENT:  normocephalic, atraumatic		  	LUNGS:	Clear bilaterally	  	HEART:	 Normal S1S2 	  	GI/ ABDOMEN:  Soft  Non tender +BS  	EXTREMITIES:   trace edema bilat LE  	MUSCULOSKELETAL: as above   SKIN:     Aquacel dressing to left hip c/d/i    FUNCTIONAL STATUS - to have functional assessment today; previously max assist bed to chair transfer, mod assist sit to stand   Gait -   Transfers -   ADL's -   Functional Transfers -    RECENT LABS/IMAGING                        9.5    7.7   )-----------( 252      ( 21 Dec 2018 05:59 )             28.8     12-21    143  |  108  |  26<H>  ----------------------------<  152<H>  4.1   |  26  |  1.42<H>    Ca    9.0      21 Dec 2018 05:59    TPro  6.0  /  Alb  2.3<L>  /  TBili  0.5  /  DBili  x   /  AST  17  /  ALT  20  /  AlkPhos  46  12-21          RADIOLOGY/OTHER RESULTS  -----    MEDICATIONS   MEDICATIONS  (STANDING):  ALPRAZolam 0.25 milliGRAM(s) Oral <User Schedule>  amLODIPine   Tablet 5 milliGRAM(s) Oral daily  apixaban 10 milliGRAM(s) Oral every 12 hours  ascorbic acid 500 milliGRAM(s) Oral two times a day  atorvastatin 10 milliGRAM(s) Oral at bedtime  dextrose 5%. 1000 milliLiter(s) (50 mL/Hr) IV Continuous <Continuous>  dextrose 50% Injectable 12.5 Gram(s) IV Push once  dextrose 50% Injectable 25 Gram(s) IV Push once  dextrose 50% Injectable 25 Gram(s) IV Push once  docusate sodium 100 milliGRAM(s) Oral three times a day  insulin lispro (HumaLOG) corrective regimen sliding scale   SubCutaneous three times a day before meals  lidocaine   Patch 2 Patch Transdermal <User Schedule>  multivitamin 1 Tablet(s) Oral daily  pantoprazole    Tablet 40 milliGRAM(s) Oral before breakfast  senna 2 Tablet(s) Oral at bedtime  zinc sulfate 220 milliGRAM(s) Oral daily    MEDICATIONS  (PRN):  acetaminophen   Tablet .. 650 milliGRAM(s) Oral every 6 hours PRN Mild Pain (1 - 3)  dextrose 40% Gel 15 Gram(s) Oral once PRN Blood Glucose LESS THAN 70 milliGRAM(s)/deciliter  glucagon  Injectable 1 milliGRAM(s) IntraMuscular once PRN Glucose LESS THAN 70 milligrams/deciliter  oxyCODONE    IR 5 milliGRAM(s) Oral every 4 hours PRN Moderate Pain (4 - 6)  oxyCODONE    IR 10 milliGRAM(s) Oral every 4 hours PRN Severe Pain (7 - 10)  polyethylene glycol 3350 17 Gram(s) Oral daily PRN Constipation      ASSESSMENT/PLAN HISTORY OF PRESENT ILLNESS  Mrs. More is an 86 year old woman who fell on 12/13, noting left hip pain and presenting to [facility]. She was found to have a left hip fracture for which she underwent a hemiarthroplasty by Dr. Brenner. Post operatively, patient found to have bilat lower extremity DVTs and VQ scan Over the weekend, patient found to have bilat DVTs and VQ scan revealed intermediate probability of pulmonary embolus. Patient started on treatement dose Eliquis 10mg BID x7 days, then to decreased to 5mg BID there after.  Also s/p 1 unit PRBCs for anemia and Hen of 7.6. H/h improved and stable.   Patient was still requiring min to mod A for bed mobility and min A for transfers and ambulation with RW. Requiring max  assist for LE dressing.   Acute IPR program recommended. Patient cleared medically for admission to acute IPR at [facility] on 12/20/18      TODAY'S SUBJECTIVE & REVIEW OF SYMPTOMS  Seen and evaluated bedside.  Slept well overnight.  Having some mild left hip pain.  Has anxiety in the mornings chronically with some "heaviness" in the chest but denies new or different chest pain, SOB.  Denies dysuria.      [X] Constitutional WNL        [X] Cardio WNL              [] Resp WNL  [X] GI WNL                            [X]  WNL                    [] Heme WNL  [X] Endo WNL                       [] Skin WNL                  [] MSK WNL  [X] Neuro WNL                     [X] Cognitive WNL         [X] Psych WNL      Vital Signs Last 24 Hrs  T(C): 37.1 (21 Dec 2018 09:29), Max: 37.1 (21 Dec 2018 09:29)  T(F): 98.7 (21 Dec 2018 09:29), Max: 98.7 (21 Dec 2018 09:29)  HR: 85 (21 Dec 2018 13:31) (71 - 87)  BP: 127/69 (21 Dec 2018 09:29) (99/57 - 127/69)  BP(mean): --  RR: 14 (21 Dec 2018 13:31) (14 - 16)  SpO2: 97% (21 Dec 2018 13:31) (93% - 97%)    Physical Exam: Mental Status - Patient is alert, awake, oriented X3. Speech is fluent. Mood and affect  normal.  	No SOB during exam  	Motor Exam -   	4+/5 bilat shoulders, 5/5 elbow and hand grasps bilat, full AROM bilat UE  	4/5 right hip flexor, 5/5 knee and ankle strengths   	3/5 left hip flexor 2/2 pain, 4/5 left knee extender, 5/5 left ankle strengths   	Sensory    Intact to light touch bilaterally.                            	GENERAL Exam:  NAD 	  	HEENT:  normocephalic, atraumatic		  	LUNGS:	Clear bilaterally	  	HEART:	 Normal S1S2 	  	GI/ ABDOMEN:  Soft  Non tender +BS  	EXTREMITIES:   trace edema bilat LE  	MUSCULOSKELETAL: as above   SKIN:     Aquacel dressing to left hip c/d/i  No erythema noted    FUNCTIONAL STATUS - to have functional assessment today; previously max assist bed to chair transfer, mod assist sit to stand   Gait - TBA  Transfers - TBA  ADL's - Eating mod I, grooming sup, UE dress min A, LE dress total A  Functional Transfers - Toilet total A    RECENT LABS/IMAGING                        9.5    7.7   )-----------( 252      ( 21 Dec 2018 05:59 )             28.8     12-21    143  |  108  |  26<H>  ----------------------------<  152<H>  4.1   |  26  |  1.42<H>    Ca    9.0      21 Dec 2018 05:59    TPro  6.0  /  Alb  2.3<L>  /  TBili  0.5  /  DBili  x   /  AST  17  /  ALT  20  /  AlkPhos  46  12-21          RADIOLOGY/OTHER RESULTS  -----    MEDICATIONS   MEDICATIONS  (STANDING):  ALPRAZolam 0.25 milliGRAM(s) Oral <User Schedule>  amLODIPine   Tablet 5 milliGRAM(s) Oral daily  apixaban 10 milliGRAM(s) Oral every 12 hours  ascorbic acid 500 milliGRAM(s) Oral two times a day  atorvastatin 10 milliGRAM(s) Oral at bedtime  dextrose 5%. 1000 milliLiter(s) (50 mL/Hr) IV Continuous <Continuous>  dextrose 50% Injectable 12.5 Gram(s) IV Push once  dextrose 50% Injectable 25 Gram(s) IV Push once  dextrose 50% Injectable 25 Gram(s) IV Push once  docusate sodium 100 milliGRAM(s) Oral three times a day  insulin lispro (HumaLOG) corrective regimen sliding scale   SubCutaneous three times a day before meals  lidocaine   Patch 2 Patch Transdermal <User Schedule>  multivitamin 1 Tablet(s) Oral daily  pantoprazole    Tablet 40 milliGRAM(s) Oral before breakfast  senna 2 Tablet(s) Oral at bedtime  zinc sulfate 220 milliGRAM(s) Oral daily    MEDICATIONS  (PRN):  acetaminophen   Tablet .. 650 milliGRAM(s) Oral every 6 hours PRN Mild Pain (1 - 3)  dextrose 40% Gel 15 Gram(s) Oral once PRN Blood Glucose LESS THAN 70 milliGRAM(s)/deciliter  glucagon  Injectable 1 milliGRAM(s) IntraMuscular once PRN Glucose LESS THAN 70 milligrams/deciliter  oxyCODONE    IR 5 milliGRAM(s) Oral every 4 hours PRN Moderate Pain (4 - 6)  oxyCODONE    IR 10 milliGRAM(s) Oral every 4 hours PRN Severe Pain (7 - 10)  polyethylene glycol 3350 17 Gram(s) Oral daily PRN Constipation      ASSESSMENT/PLAN

## 2018-12-21 NOTE — PROGRESS NOTE ADULT - PROBLEM SELECTOR PLAN 1
s/p hemiarthroplsty with functional deficits  PT/OT   MVI, zinc and vit c for healing  Oxycodone [id number] prn, tylenol prn, ice packs prn, and lidocaine patches for pain management s/p hemiarthroplasty with functional deficits  PT/OT   MVI, zinc and vit c for healing  Oxycodone [id number] prn, tylenol prn, ice packs prn, and lidocaine patches for pain management

## 2018-12-21 NOTE — CONSULT NOTE ADULT - ASSESSMENT
86 year old female from home PMH HTN, DMII, HLD, presented with fall admitted for acute left hip fracture.    >Acute left hip fracture: underwent a hemiarthroplasty by Dr. Brenner, here for comprehensive PT and OT program   pain control, MVI, zinc and vit c for healing, f/u orthopedics as outpatient    >Left hip pain secondary to above: pain control, Tylenol prn, Oxycodone [id number] prn, ice packs prn, and lidocaine patches for pain management.     >DVT (deep venous thrombosis).  Plan: Continue eliquis 10mg q12 for total of 7 days (ending on 12/22), start maintenance dose of Eliquis 5mg q12 starting on 12/23  F/u Vascular consult     >Pulmonary emboli.  Plan: Continue eliquis, Oxygen prn.     >CKD III: baseline Cr is 1.5, continue to encourage hydration, hold ARB    >Anterior mediastinal rim enhancing mass: appears stable from 2016, consulted [facility], needs outpt f/u at discharge, not causing any acute issues currently     >DMII: continue SSI, check HcA1C - 6.1%, monitor accuchecks, currently at goal, ISS, diabetic diet    >HLD: continue lipitor.    >HTN: BP controlled, continue Norvasc, hold ARB     >Anxiety.  Plan: Continue xanax once daily.    >DVT PPX: patient is on Eliquis

## 2018-12-21 NOTE — PROGRESS NOTE ADULT - ASSESSMENT
86 woman s/p left hip fracture treated with hemiarthroplasty on 12/13 with functional deficits consisting of gait and ADL impairments with decreased endurance and strength, and newly diagnosed bilat DVTs and PE.

## 2018-12-21 NOTE — CHART NOTE - NSCHARTNOTEFT_GEN_A_CORE
[facility] Interdisciplinary Plan of Care    REHABILITATION DIAGNOSIS:  Closed nondisplaced intertrochanteric fracture of femur        COMORBIDITIES/COMPLICATING CONDITIONS IMPACTING REHABILITATION:  HEALTH ISSUES - PROBLEM Dx:  Mediastinal mass: Mediastinal mass  Stage 3 chronic kidney disease: Stage 3 chronic kidney disease  Pulmonary thromboembolism  Anxiety: Anxiety  Pure hypercholesterolemia: Pure hypercholesterolemia  HTN (hypertension): HTN (hypertension)  Type 2 diabetes mellitus without complication, without long-term current use of insulin: Type 2 diabetes mellitus without complication, without long-term current use of insulin  Pulmonary emboli: Pulmonary emboli  DVT (deep venous thrombosis): DVT (deep venous thrombosis)  Femoral fracture: Femoral fracture        PAST MEDICAL & SURGICAL HISTORY:  Anxiety  DVT (deep venous thrombosis)  Pure hypercholesterolemia  Type 2 diabetes mellitus without complication, without long-term current use of insulin  HTN (hypertension)  History of cholecystectomy      Based upon consideration of the patient's impairments, functional status, complicating conditions and any other contributing factors and after information garnered from the assessments of all therapy disciplines involved in treating the patient and other pertinent clinicians:    INTERDISCIPLINARY REHABILITATION INTERVENTIONS:    [ x  ] Transfer Training  [ x  ] Bed Mobility  [ x  ] Therapeutic Exercise  [ x  ] Balance/Coordination Exercises  [  x ] Locomotion retraining  [ x  ] Stairs  [ x  ] Functional Transfer Training  [   ] Bowel/Bladder program  [ x  ] Pain Management  [  x ] Skin/Wound Care  [   ] Visual/Perceptual Training  [ x  ] Therapeutic Recreation Activities  [   ] Neuromuscular Re-education  [x   ] Activities of Daily Living  [   ] Speech Exercise  [   ] Swallowing Exercises  [   ] Vital Stim  [ x  ] Dietary Supplements  [   ] Calorie Count  [   ] Cognitive Exercises  [   ] Cognitive/Linguistic Treatment  [   ] Behavior Program  [   ] Neuropsych Therapy  [x  ] Patient/Family Counseling  [ x  ] Family Training  [ x  ] Community Re-entry  [   ] Orthotic Evaluation  [   ] Prosthetic Eval/Training    MEDICAL PROGNOSIS:  Good    REHAB POTENTIAL:  Good  EXPECTED DAILY THERAPY:         PT:1.5hrs       OT:1.5hrs       ST:0       P&O:0    EXPECTED INTENSITY OF PROGRAM:  3 hrs / Day    EXPECTED FREQUENCY OF PROGRAM: 5 Days/ Week    ESTIMATED LOS:  [  ] 5-7 Days  [  ] 7-10 Days  [  ] 10- 14 Days  [ x ] 14- 18 Days  [  ] 18- 21 Days    ESTIMATED DISPOSITION:  [  ] Home   [  ] Home with Outpatient Therapies  [ x ] Home with Home Therapies  [  ] Assisted Living  [  ] Nursing Home  [  ] Long Term Acute Care    INTERDISCIPLINARY FUNCTIONAL OUTCOMES/GOALS:         Gait/Mobility:6       Transfers:6       ADLs:6       Functional Transfers:6       Medication Management:7       Communication:na       Cognitive:na       Dysphagia:na       Bladder:7       Bowel:     Functional Independent Measures:   7 = Independent  6 = Modified Independent  5 = Supervision  4 = Minimal Assist/ Contact Guard  3 = Moderate Assistance  2 = Maximum Assistance  1 = Total Assistance  0 = Unable to assess

## 2018-12-21 NOTE — PROGRESS NOTE ADULT - PROBLEM SELECTOR PLAN 4
Per Endo- continue off metformin 2/2 kidney function  Contnue SSI and monitor accuchecks BID HbA1C 6.1.  Per Endo- continue off metformin 2/2 kidney function  Continue SSI and monitor accuchecks BID

## 2018-12-21 NOTE — CONSULT NOTE ADULT - SUBJECTIVE AND OBJECTIVE BOX
Patient is a 86 year old  Female who presents with a chief complaint of fall resulting in left hip frature requiring hemiarthroplasty, functional deficits (20 Dec 2018 15:21)    HPI: 86 year old woman who fell on 12/13/2018, noting left hip pain and presenting to [facility]. She was found to have a left hip fracture for which she underwent a hemiarthroplasty by Dr. Brenner. Post operatively, patient found to have bilateral lower extremity DVTs and VQ scan revealed intermediate probability of pulmonary embolus. Patient started on treatement dose Eliquis 10mg BID x7 days, then to decreased to 5mg BID there after.  Also s/p 1 unit PRBCs for anemia for Hb of 7.6. H/H improved and stable. Patient cleared medically for admission to acute rehab here at [facility] on 12/20/18. Patient reports dull intermittent moderate left hip pain, worse with movement and better with pain medication. Patient denies any other complaints at time of assessment.     Past Medical History:  Anxiety    DVT (deep venous thrombosis)    HTN (hypertension)    Pure hypercholesterolemia    Type 2 diabetes mellitus without complication, without long-term current use of insulin.    Past Surgical History:  History of cholecystectomy.    Family History:  No pertinent family history in first degree relatives.    Social History:  Patient is a former smoker, quit smoking 50 years ago, denies alcohol or  illicit drug use	    Allergies: Penicillin    MEDICATIONS  (STANDING):  ALPRAZolam 0.25 milliGRAM(s) Oral <User Schedule>  amLODIPine   Tablet 5 milliGRAM(s) Oral daily  apixaban 10 milliGRAM(s) Oral every 12 hours  ascorbic acid 500 milliGRAM(s) Oral two times a day  atorvastatin 10 milliGRAM(s) Oral at bedtime  dextrose 5%. 1000 milliLiter(s) (50 mL/Hr) IV Continuous <Continuous>  dextrose 50% Injectable 12.5 Gram(s) IV Push once  dextrose 50% Injectable 25 Gram(s) IV Push once  dextrose 50% Injectable 25 Gram(s) IV Push once  docusate sodium 100 milliGRAM(s) Oral three times a day  insulin lispro (HumaLOG) corrective regimen sliding scale   SubCutaneous three times a day before meals  lidocaine   Patch 2 Patch Transdermal <User Schedule>  multivitamin 1 Tablet(s) Oral daily  pantoprazole    Tablet 40 milliGRAM(s) Oral before breakfast  senna 2 Tablet(s) Oral at bedtime  zinc sulfate 220 milliGRAM(s) Oral daily    MEDICATIONS  (PRN):  acetaminophen   Tablet .. 650 milliGRAM(s) Oral every 6 hours PRN Mild Pain (1 - 3)  dextrose 40% Gel 15 Gram(s) Oral once PRN Blood Glucose LESS THAN 70 milliGRAM(s)/deciliter  glucagon  Injectable 1 milliGRAM(s) IntraMuscular once PRN Glucose LESS THAN 70 milligrams/deciliter  oxyCODONE    IR 5 milliGRAM(s) Oral every 4 hours PRN Moderate Pain (4 - 6)  oxyCODONE    IR 10 milliGRAM(s) Oral every 4 hours PRN Severe Pain (7 - 10)  polyethylene glycol 3350 17 Gram(s) Oral daily PRN Constipation      REVIEW OF SYSTEMS:  CONSTITUTIONAL: No fever, weight loss; has mild fatigue  EYES: No eye pain, visual disturbances, or discharge  ENMT:  No difficulty hearing, tinnitus, vertigo; No sinus or throat pain  NECK: No pain or stiffness  RESPIRATORY: No cough, wheezing, chills or hemoptysis; No shortness of breath  CARDIOVASCULAR: No chest pain, palpitations, dizziness, or leg swelling  GASTROINTESTINAL: No abdominal or epigastric pain. No nausea, vomiting, or hematemesis; No diarrhea or constipation. No melena or hematochezia.  GENITOURINARY: No dysuria, frequency, hematuria, or incontinence  NEUROLOGICAL: No headaches, memory loss, loss of strength, numbness, or tremors  SKIN: No itching, burning, rashes, or lesions   ENDOCRINE: No heat or cold intolerance; No hair loss  MUSCULOSKELETAL: Left hip pain, No other joint pain or swelling; No muscle, back, or extremity pain  PSYCHIATRIC: No depression, anxiety, mood swings, or difficulty sleeping  HEME/LYMPH: No easy bruising, or bleeding gums  ALLERGY AND IMMUNOLOGIC: No hives or eczema      PHYSICAL EXAM:  T(C): 36.8 (12-20-18 @ 20:20), Max: 36.8 (12-20-18 @ 14:15)  HR: 71 (12-21-18 @ 05:22) (71 - 84)  BP: 113/69 (12-21-18 @ 05:22) (99/57 - 139/66)  RR: 14 (12-20-18 @ 20:20) (13 - 14)  SpO2: 95% (12-20-18 @ 20:20) (95% - 98%)      GENERAL: NAD, well-groomed, well-developed  HEAD:  Atraumatic, Normocephalic  EYES: EOMI, PERRL, conjunctiva and sclera clear  ENMT: Moist mucous membranes  NECK: Supple, No JVD, Normal thyroid  NERVOUS SYSTEM:  Alert & Oriented X3, no focal deficit  CHEST/LUNG: Clear to ascultation bilaterally; No rales, rhonchi, wheezing, or rubs  HEART: Regular rate and rhythm; No murmurs, rubs, or gallops  ABDOMEN: Soft, Nontender, Nondistended; Bowel sounds present  EXTREMITIES:  2+ Peripheral Pulses, No clubbing, cyanosis, or edema  SKIN: left hip incision healing well, No rash    LABS:                        9.5    7.7   )-----------( 252      ( 21 Dec 2018 05:59 )             28.8               CAPILLARY BLOOD GLUCOSE  POCT Blood Glucose.: 157 mg/dL (21 Dec 2018 07:50)  POCT Blood Glucose.: 204 mg/dL (20 Dec 2018 21:21)  POCT Blood Glucose.: 173 mg/dL (20 Dec 2018 16:32)  POCT Blood Glucose.: 204 mg/dL (20 Dec 2018 11:19)            RADIOLOGY & ADDITIONAL TESTS:    Imaging Personally Reviewed:  [x] YES  [ ] NO    Consultant(s) Notes Reviewed:  [x] YES  [ ] NO    Care Discussed with Consultants/Other Providers [x] YES  [ ] NO

## 2018-12-22 PROCEDURE — 99232 SBSQ HOSP IP/OBS MODERATE 35: CPT | Mod: GC

## 2018-12-22 RX ADMIN — Medication 0.25 MILLIGRAM(S): at 07:44

## 2018-12-22 RX ADMIN — ZINC SULFATE TAB 220 MG (50 MG ZINC EQUIVALENT) 220 MILLIGRAM(S): 220 (50 ZN) TAB at 11:34

## 2018-12-22 RX ADMIN — APIXABAN 10 MILLIGRAM(S): 2.5 TABLET, FILM COATED ORAL at 05:33

## 2018-12-22 RX ADMIN — APIXABAN 10 MILLIGRAM(S): 2.5 TABLET, FILM COATED ORAL at 17:45

## 2018-12-22 RX ADMIN — Medication 100 MILLIGRAM(S): at 05:33

## 2018-12-22 RX ADMIN — LIDOCAINE 2 PATCH: 4 CREAM TOPICAL at 05:33

## 2018-12-22 RX ADMIN — Medication 500 MILLIGRAM(S): at 05:33

## 2018-12-22 RX ADMIN — PANTOPRAZOLE SODIUM 40 MILLIGRAM(S): 20 TABLET, DELAYED RELEASE ORAL at 05:32

## 2018-12-22 RX ADMIN — Medication 500 MILLIGRAM(S): at 17:45

## 2018-12-22 RX ADMIN — Medication 1 TABLET(S): at 11:34

## 2018-12-22 RX ADMIN — Medication 1: at 07:48

## 2018-12-22 RX ADMIN — Medication 650 MILLIGRAM(S): at 13:52

## 2018-12-22 RX ADMIN — ATORVASTATIN CALCIUM 10 MILLIGRAM(S): 80 TABLET, FILM COATED ORAL at 21:57

## 2018-12-22 RX ADMIN — Medication 100 MILLIGRAM(S): at 13:52

## 2018-12-22 RX ADMIN — LIDOCAINE 2 PATCH: 4 CREAM TOPICAL at 07:46

## 2018-12-22 RX ADMIN — Medication 2: at 16:52

## 2018-12-22 NOTE — CHART NOTE - NSCHARTNOTEFT_GEN_A_CORE
Upon Nutritional Assessment by the Registered Dietitian your patient was determined to meet criteria / has evidence of the following diagnosis/diagnoses:          [ ]  Mild Protein Calorie Malnutrition        [X]  Moderate Protein Calorie Malnutrition        [ ] Severe Protein Calorie Malnutrition        [ ] Unspecified Protein Calorie Malnutrition    Findings as based on:  [X] Comprehensive Nutrition Assessment    [X] Nutrition Focused Physical Exam - Orbital, Clavicle & Hand Wasting Observed  [X] Other: Poor PO Intake/Appetite Over Last Week Plus    Nutrition Plan/Recommendations:    1) Continue on Glucerna 8oz PO BID    PROVIDER Section:   By signing this assessment you are acknowledging and agree with the diagnosis/diagnoses assigned by the Registered Dietitian    Aris Izaguirre RD

## 2018-12-22 NOTE — DIETITIAN INITIAL EVALUATION ADULT. - PROBLEM SELECTOR PLAN 1
s/p hemiarthroplasty with functional deficits  Start comprehensive PT and OT program   MVI, zinc and vit c for healing  Oxycodone [id number] prn, tylenol prn, ice packs prn, and lidocaine patches for pain management

## 2018-12-22 NOTE — PROGRESS NOTE ADULT - SUBJECTIVE AND OBJECTIVE BOX
Cc: Gait dysfunction, Closed nondisplaced intertrochanteric fracture of femur    HPI: Patient with no new medical issues today.  Pain controlled, no chest pain, no N/V, no Fevers/Chills. No other new ROS  Has been tolerating rehabilitation program.    acetaminophen   Tablet .. 650 milliGRAM(s) Oral every 6 hours PRN  ALPRAZolam 0.25 milliGRAM(s) Oral <User Schedule>  ALPRAZolam 0.25 milliGRAM(s) Oral daily PRN  amLODIPine   Tablet 5 milliGRAM(s) Oral daily  apixaban 10 milliGRAM(s) Oral every 12 hours  ascorbic acid 500 milliGRAM(s) Oral two times a day  atorvastatin 10 milliGRAM(s) Oral at bedtime  dextrose 40% Gel 15 Gram(s) Oral once PRN  dextrose 5%. 1000 milliLiter(s) IV Continuous <Continuous>  dextrose 50% Injectable 12.5 Gram(s) IV Push once  dextrose 50% Injectable 25 Gram(s) IV Push once  dextrose 50% Injectable 25 Gram(s) IV Push once  docusate sodium 100 milliGRAM(s) Oral three times a day  glucagon  Injectable 1 milliGRAM(s) IntraMuscular once PRN  insulin lispro (HumaLOG) corrective regimen sliding scale   SubCutaneous three times a day before meals  lidocaine   Patch 2 Patch Transdermal <User Schedule>  multivitamin 1 Tablet(s) Oral daily  oxyCODONE    IR 5 milliGRAM(s) Oral every 4 hours PRN  oxyCODONE    IR 10 milliGRAM(s) Oral every 4 hours PRN  pantoprazole    Tablet 40 milliGRAM(s) Oral before breakfast  polyethylene glycol 3350 17 Gram(s) Oral daily PRN  senna 2 Tablet(s) Oral at bedtime  zinc sulfate 220 milliGRAM(s) Oral daily                          9.5    7.7   )-----------( 252      ( 21 Dec 2018 05:59 )             28.8       T(C): 36.8 (12-22-18 @ 08:55), Max: 37.1 (12-21-18 @ 09:29)  HR: 84 (12-22-18 @ 08:55) (82 - 87)  BP: 105/65 (12-22-18 @ 08:55) (104/66 - 127/69)  RR: 14 (12-22-18 @ 08:55) (14 - 16)  SpO2: 97% (12-22-18 @ 08:55) (93% - 97%)    In NAD  HEENT- EOMI  Heart- RRR, S1S2  Lungs- CTA bl.  Abd- + BS, NT  Ext- No calf pain  Neuro- Exam unchanged  incision with dressing c/d/i         Imp: Patient with diagnosis of          admitted for comprehensive acute rehabilitation.    Plan:  - Continue PT/OT/SLP  - DVT prophylaxis  - Skin- Turn q2h, check skin daily  - Continue current medications; patient medically stable.   -Active issues-   - Patient is stable to continue current rehabilitation program. Cc: Gait dysfunction, Closed nondisplaced intertrochanteric fracture of femur    HPI: Patient with no new medical issues today.  Pain controlled, no chest pain, no N/V, no Fevers/Chills. No other new ROS  Has been tolerating rehabilitation program.    acetaminophen   Tablet .. 650 milliGRAM(s) Oral every 6 hours PRN  ALPRAZolam 0.25 milliGRAM(s) Oral <User Schedule>  ALPRAZolam 0.25 milliGRAM(s) Oral daily PRN  amLODIPine   Tablet 5 milliGRAM(s) Oral daily  apixaban 10 milliGRAM(s) Oral every 12 hours  ascorbic acid 500 milliGRAM(s) Oral two times a day  atorvastatin 10 milliGRAM(s) Oral at bedtime  dextrose 40% Gel 15 Gram(s) Oral once PRN  dextrose 5%. 1000 milliLiter(s) IV Continuous <Continuous>  dextrose 50% Injectable 12.5 Gram(s) IV Push once  dextrose 50% Injectable 25 Gram(s) IV Push once  dextrose 50% Injectable 25 Gram(s) IV Push once  docusate sodium 100 milliGRAM(s) Oral three times a day  glucagon  Injectable 1 milliGRAM(s) IntraMuscular once PRN  insulin lispro (HumaLOG) corrective regimen sliding scale   SubCutaneous three times a day before meals  lidocaine   Patch 2 Patch Transdermal <User Schedule>  multivitamin 1 Tablet(s) Oral daily  oxyCODONE    IR 5 milliGRAM(s) Oral every 4 hours PRN  oxyCODONE    IR 10 milliGRAM(s) Oral every 4 hours PRN  pantoprazole    Tablet 40 milliGRAM(s) Oral before breakfast  polyethylene glycol 3350 17 Gram(s) Oral daily PRN  senna 2 Tablet(s) Oral at bedtime  zinc sulfate 220 milliGRAM(s) Oral daily                          9.5    7.7   )-----------( 252      ( 21 Dec 2018 05:59 )             28.8       T(C): 36.8 (12-22-18 @ 08:55), Max: 37.1 (12-21-18 @ 09:29)  HR: 84 (12-22-18 @ 08:55) (82 - 87)  BP: 105/65 (12-22-18 @ 08:55) (104/66 - 127/69)  RR: 14 (12-22-18 @ 08:55) (14 - 16)  SpO2: 97% (12-22-18 @ 08:55) (93% - 97%)    In NAD  HEENT- EOMI  Heart- RRR, S1S2  Lungs- CTA bl.  Abd- + BS, NT  Ext- No calf pain  Neuro- Exam unchanged  incision with dressing c/d/i         Imp: Patient with diagnosis of          admitted for comprehensive acute rehabilitation.    Plan:  - Continue PT/OT/SLP  - DVT prophylaxis  - Skin- Turn q2h, check skin daily  - Continue current medications; patient medically stable.   -Active issues- none  - Patient is stable to continue current rehabilitation program.

## 2018-12-22 NOTE — DIETITIAN INITIAL EVALUATION ADULT. - OTHER INFO
86yr Old Female - Dx: Femur Fx - Initial Assessment - Consistent Carbohydrate Diet w/ Thin Liquids, Denies Food Allergy, Tolerates Diet, No Chewing/Swallowing Complications (Per Pt), Consumes 100% of Meal Since Admission, No Pressure Ulcers, No Edema, No Recent N/V/D/C

## 2018-12-23 PROCEDURE — 99232 SBSQ HOSP IP/OBS MODERATE 35: CPT

## 2018-12-23 PROCEDURE — 99232 SBSQ HOSP IP/OBS MODERATE 35: CPT | Mod: GC

## 2018-12-23 RX ORDER — OXYCODONE HYDROCHLORIDE 5 MG/1
5 TABLET ORAL
Qty: 0 | Refills: 0 | Status: DISCONTINUED | OUTPATIENT
Start: 2018-12-23 | End: 2018-12-30

## 2018-12-23 RX ORDER — APIXABAN 2.5 MG/1
1 TABLET, FILM COATED ORAL
Qty: 0 | Refills: 0 | COMMUNITY
Start: 2018-12-23

## 2018-12-23 RX ADMIN — Medication 100 MILLIGRAM(S): at 12:37

## 2018-12-23 RX ADMIN — Medication 1 TABLET(S): at 12:37

## 2018-12-23 RX ADMIN — APIXABAN 5 MILLIGRAM(S): 2.5 TABLET, FILM COATED ORAL at 17:30

## 2018-12-23 RX ADMIN — Medication 650 MILLIGRAM(S): at 12:38

## 2018-12-23 RX ADMIN — Medication 650 MILLIGRAM(S): at 17:36

## 2018-12-23 RX ADMIN — OXYCODONE HYDROCHLORIDE 5 MILLIGRAM(S): 5 TABLET ORAL at 10:08

## 2018-12-23 RX ADMIN — ATORVASTATIN CALCIUM 10 MILLIGRAM(S): 80 TABLET, FILM COATED ORAL at 21:06

## 2018-12-23 RX ADMIN — OXYCODONE HYDROCHLORIDE 10 MILLIGRAM(S): 5 TABLET ORAL at 21:54

## 2018-12-23 RX ADMIN — SENNA PLUS 2 TABLET(S): 8.6 TABLET ORAL at 21:06

## 2018-12-23 RX ADMIN — ZINC SULFATE TAB 220 MG (50 MG ZINC EQUIVALENT) 220 MILLIGRAM(S): 220 (50 ZN) TAB at 12:37

## 2018-12-23 RX ADMIN — LIDOCAINE 2 PATCH: 4 CREAM TOPICAL at 18:30

## 2018-12-23 RX ADMIN — Medication 100 MILLIGRAM(S): at 21:06

## 2018-12-23 RX ADMIN — OXYCODONE HYDROCHLORIDE 10 MILLIGRAM(S): 5 TABLET ORAL at 21:06

## 2018-12-23 RX ADMIN — Medication 1: at 08:08

## 2018-12-23 RX ADMIN — Medication 0.25 MILLIGRAM(S): at 08:08

## 2018-12-23 RX ADMIN — APIXABAN 5 MILLIGRAM(S): 2.5 TABLET, FILM COATED ORAL at 05:35

## 2018-12-23 RX ADMIN — Medication 100 MILLIGRAM(S): at 05:35

## 2018-12-23 RX ADMIN — Medication 500 MILLIGRAM(S): at 17:31

## 2018-12-23 RX ADMIN — OXYCODONE HYDROCHLORIDE 5 MILLIGRAM(S): 5 TABLET ORAL at 10:45

## 2018-12-23 RX ADMIN — PANTOPRAZOLE SODIUM 40 MILLIGRAM(S): 20 TABLET, DELAYED RELEASE ORAL at 05:35

## 2018-12-23 RX ADMIN — LIDOCAINE 2 PATCH: 4 CREAM TOPICAL at 07:12

## 2018-12-23 RX ADMIN — Medication 650 MILLIGRAM(S): at 13:15

## 2018-12-23 RX ADMIN — Medication 2: at 16:47

## 2018-12-23 RX ADMIN — AMLODIPINE BESYLATE 5 MILLIGRAM(S): 2.5 TABLET ORAL at 05:35

## 2018-12-23 RX ADMIN — Medication 500 MILLIGRAM(S): at 05:35

## 2018-12-23 RX ADMIN — Medication 650 MILLIGRAM(S): at 18:31

## 2018-12-23 RX ADMIN — LIDOCAINE 2 PATCH: 4 CREAM TOPICAL at 17:28

## 2018-12-23 NOTE — CONSULT NOTE ADULT - SUBJECTIVE AND OBJECTIVE BOX
History of Present Illness:  86 y.o.  Female with a history of recent left hip fracture and s/p ORIF presents with left >>right calf edema. Venous doppler is significant for bilateral DVT and V/Q scan is moderate probability for PE. The patient is on therapeutic Eliquis. I was requested to evaluate her LE circulation.    PAST MEDICAL & SURGICAL HISTORY:  Anxiety  DVT (deep venous thrombosis)  Pure hypercholesterolemia  Type 2 diabetes mellitus without complication, without long-term current use of insulin  HTN (hypertension)  History of cholecystectomy      Allergies    penicillin (Rash; Urticaria)    Intolerances        MEDICATIONS  (STANDING):  ALPRAZolam 0.25 milliGRAM(s) Oral <User Schedule>  amLODIPine   Tablet 5 milliGRAM(s) Oral daily  apixaban 5 milliGRAM(s) Oral every 12 hours  ascorbic acid 500 milliGRAM(s) Oral two times a day  atorvastatin 10 milliGRAM(s) Oral at bedtime  dextrose 5%. 1000 milliLiter(s) (50 mL/Hr) IV Continuous <Continuous>  dextrose 50% Injectable 12.5 Gram(s) IV Push once  dextrose 50% Injectable 25 Gram(s) IV Push once  dextrose 50% Injectable 25 Gram(s) IV Push once  docusate sodium 100 milliGRAM(s) Oral three times a day  insulin lispro (HumaLOG) corrective regimen sliding scale   SubCutaneous three times a day before meals  lidocaine   Patch 2 Patch Transdermal <User Schedule>  multivitamin 1 Tablet(s) Oral daily  oxyCODONE    IR 5 milliGRAM(s) Oral <User Schedule>  pantoprazole    Tablet 40 milliGRAM(s) Oral before breakfast  senna 2 Tablet(s) Oral at bedtime  zinc sulfate 220 milliGRAM(s) Oral daily    MEDICATIONS  (PRN):  acetaminophen   Tablet .. 650 milliGRAM(s) Oral every 6 hours PRN Mild Pain (1 - 3)  ALPRAZolam 0.25 milliGRAM(s) Oral daily PRN anxiety  dextrose 40% Gel 15 Gram(s) Oral once PRN Blood Glucose LESS THAN 70 milliGRAM(s)/deciliter  glucagon  Injectable 1 milliGRAM(s) IntraMuscular once PRN Glucose LESS THAN 70 milligrams/deciliter  oxyCODONE    IR 5 milliGRAM(s) Oral every 4 hours PRN Moderate Pain (4 - 6)  oxyCODONE    IR 10 milliGRAM(s) Oral every 4 hours PRN Severe Pain (7 - 10)  polyethylene glycol 3350 17 Gram(s) Oral daily PRN Constipation      Social History:  Smoking History: denies  Alcohol Use: denies    REVIEW OF SYSTEMS:  CONSTITUTIONAL: No weakness, fevers or chills  EYES/ENT: No visual changes;  No vertigo or throat pain   NECK: No pain or stiffness  RESPIRATORY: No cough, wheezing, hemoptysis; No shortness of breath  CARDIOVASCULAR: No chest pain or palpitations  GASTROINTESTINAL: No abdominal or epigastric pain. No nausea, vomiting, or hematemesis; No diarrhea or constipation. No melena or hematochezia.  GENITOURINARY: No dysuria, frequency or hematuria  NEUROLOGICAL: No numbness or weakness  SKIN: No itching, burning, rashes, or lesions   Vascular:  No lower extremity claudication, pedal rest pain or digital ulcers. +++ edema of both legs and pain in left hip.  All other review of systems is negative unless indicated above.    PHYSICAL EXAM:  General:  On exam, the patient is alert nontoxic appearing Female in no acute distress.  Vital Signs Last 24 Hrs  T(C): 36.7 (23 Dec 2018 08:20), Max: 36.7 (23 Dec 2018 08:20)  T(F): 98 (23 Dec 2018 08:20), Max: 98 (23 Dec 2018 08:20)  HR: 88 (23 Dec 2018 08:20) (80 - 88)  BP: 115/68 (23 Dec 2018 08:20) (100/63 - 133/75)  BP(mean): --  RR: 15 (23 Dec 2018 08:20) (14 - 15)  SpO2: 94% (23 Dec 2018 08:20) (94% - 97%)    Neck:  4+/4+ bilateral carotid pulses; no carotid bruit, no palpable cervical masses.  Heart:  Regular, no murmurs, rubs or gallops.    Lungs:  Clear to auscultation.    Chest:  No chest wall deformities  Symmetrical chest expansion.   Abdomen: Soft and nontender.  No palpable masses.  No rebound, guarding or rigidity.  Extremities: There is 2+ edema of left>> right calves.  Feet are warm, pink with normal capillary refill times.  There are no digital ulcers or heel decubiti.  The calf and thigh muscles are soft and nontender.  There are no palpable cords or limb cellulitis.  Melonie's sign  is negative bilaterally.  There is no lower extremity cyanosis, or rubor.  On examination of the peripheral pulses:  Left leg femoral pulse is  4/4  , popliteal pulse is  4/4  ,PT Pulse is 2/4   , DP Pulse is 2/4   Right leg femoral pulse is 4/4   ,popliteal pulse is 4/4   , PT Pulse is 2/4  , DP Pulse is 2/4   Neurological:  There are no motor or sensory deficits in either lower extremity.                    Radiology:

## 2018-12-23 NOTE — PROGRESS NOTE ADULT - PROBLEM SELECTOR PLAN 1
s/p hemiarthroplasty with functional deficits  PT/OT   MVI, zinc and vit c for healing  Add Scheduled Oxycodone prior to therapy (0600 and 1200)   Oxycodone [id number] prn, tylenol prn, ice packs prn, and lidocaine patches for pain management

## 2018-12-23 NOTE — CONSULT NOTE ADULT - ASSESSMENT
86.y.o. female s/p ORIF of left hip has bilateral DVT and PE. The DVT  are provoked. I agree with therapeutic Eliquis and recommend at least 6 months.  No further vascular interventions are needed. The patient is stable to continue PT therapy. The patient was given informed consent.

## 2018-12-23 NOTE — PROGRESS NOTE ADULT - SUBJECTIVE AND OBJECTIVE BOX
HISTORY OF PRESENT ILLNESS  Mrs. More is an 86 year old woman who fell on 12/13, noting left hip pain and presenting to [facility]. She was found to have a left hip fracture for which she underwent a hemiarthroplasty by Dr. Brenner. Post operatively, patient found to have bilat lower extremity DVTs and VQ scan Over the weekend, patient found to have bilat DVTs and VQ scan revealed intermediate probability of pulmonary embolus. Patient started on treatement dose Eliquis 10mg BID x7 days, then to decreased to 5mg BID there after.  Also s/p 1 unit PRBCs for anemia and Hen of 7.6. H/h improved and stable.   Patient was still requiring min to mod A for bed mobility and min A for transfers and ambulation with RW. Requiring max  assist for LE dressing.   Acute IPR program recommended. Patient cleared medically for admission to acute IPR at [facility] on 12/20/18      TODAY'S SUBJECTIVE & REVIEW OF SYMPTOMS  Seen and evaluated during PT.  Slept well overnight.  Notes left hip pain 8/10 in severity now after walking during therapy.  Pain improves with rest and is improving overall.  She is requesting medication prior to therapies.  Denies new or different chest pain, SOB.  Denies dysuria.      [X] Constitutional WNL        [X] Cardio WNL              [] Resp WNL  [X] GI WNL                            [X]  WNL                    [] Heme WNL  [X] Endo WNL                       [] Skin WNL                  [] MSK left hip pain   [X] Neuro WNL                     [X] Cognitive WNL         [X] Psych anxiety at times      Vital Signs Last 24 Hrs  T(C): 36.7 (23 Dec 2018 08:20), Max: 36.7 (23 Dec 2018 08:20)  T(F): 98 (23 Dec 2018 08:20), Max: 98 (23 Dec 2018 08:20)  HR: 88 (23 Dec 2018 08:20) (80 - 88)  BP: 115/68 (23 Dec 2018 08:20) (100/63 - 133/75)  BP(mean): --  RR: 15 (23 Dec 2018 08:20) (14 - 15)  SpO2: 94% (23 Dec 2018 08:20) (94% - 97%)    Physical Exam: Mental Status - Patient is alert, awake, oriented X3. Speech is fluent. Mood and affect  normal.  	No SOB during exam  	Motor Exam -   	4+/5 bilat shoulders, 5/5 elbow and hand grasps bilat, full AROM bilat UE  	4/5 right hip flexor, 5/5 knee and ankle strengths   	3/5 left hip flexor 2/2 pain, 4/5 left knee extender, 5/5 left ankle strengths   	Sensory    Intact to light touch bilaterally.                            	GENERAL Exam:  NAD 	  	HEENT:  normocephalic, atraumatic		  	LUNGS:	Clear bilaterally	  	HEART:	 Normal S1S2 	  	GI/ ABDOMEN:  Soft  Non tender +BS  	EXTREMITIES:   trace edema bilat LE  	MUSCULOSKELETAL: as above   SKIN:     Aquacel dressing to left hip c/d/i  No erythema noted    FUNCTIONAL STATUS - to have functional assessment today; previously max assist bed to chair transfer, mod assist sit to stand   Gait - TBA  Transfers - TBA  ADL's - Eating mod I, grooming sup, UE dress min A, LE dress total A  Functional Transfers - Toilet total A    RECENT LABS/IMAGING                        9.5    7.7   )-----------( 252      ( 21 Dec 2018 05:59 )             28.8     12-21    143  |  108  |  26<H>  ----------------------------<  152<H>  4.1   |  26  |  1.42<H>    Ca    9.0      21 Dec 2018 05:59    TPro  6.0  /  Alb  2.3<L>  /  TBili  0.5  /  DBili  x   /  AST  17  /  ALT  20  /  AlkPhos  46  12-21      CAPILLARY BLOOD GLUCOSE  POCT Blood Glucose.: 159 mg/dL (23 Dec 2018 07:43)  POCT Blood Glucose.: 245 mg/dL (22 Dec 2018 16:51)        RADIOLOGY/OTHER RESULTS  -----  MEDICATIONS  (STANDING):  ALPRAZolam 0.25 milliGRAM(s) Oral <User Schedule>  amLODIPine   Tablet 5 milliGRAM(s) Oral daily  apixaban 5 milliGRAM(s) Oral every 12 hours  ascorbic acid 500 milliGRAM(s) Oral two times a day  atorvastatin 10 milliGRAM(s) Oral at bedtime  dextrose 5%. 1000 milliLiter(s) (50 mL/Hr) IV Continuous <Continuous>  dextrose 50% Injectable 12.5 Gram(s) IV Push once  dextrose 50% Injectable 25 Gram(s) IV Push once  dextrose 50% Injectable 25 Gram(s) IV Push once  docusate sodium 100 milliGRAM(s) Oral three times a day  insulin lispro (HumaLOG) corrective regimen sliding scale   SubCutaneous three times a day before meals  lidocaine   Patch 2 Patch Transdermal <User Schedule>  multivitamin 1 Tablet(s) Oral daily  oxyCODONE    IR 5 milliGRAM(s) Oral <User Schedule>  pantoprazole    Tablet 40 milliGRAM(s) Oral before breakfast  senna 2 Tablet(s) Oral at bedtime  zinc sulfate 220 milliGRAM(s) Oral daily    MEDICATIONS  (PRN):  acetaminophen   Tablet .. 650 milliGRAM(s) Oral every 6 hours PRN Mild Pain (1 - 3)  ALPRAZolam 0.25 milliGRAM(s) Oral daily PRN anxiety  dextrose 40% Gel 15 Gram(s) Oral once PRN Blood Glucose LESS THAN 70 milliGRAM(s)/deciliter  glucagon  Injectable 1 milliGRAM(s) IntraMuscular once PRN Glucose LESS THAN 70 milligrams/deciliter  oxyCODONE    IR 5 milliGRAM(s) Oral every 4 hours PRN Moderate Pain (4 - 6)  oxyCODONE    IR 10 milliGRAM(s) Oral every 4 hours PRN Severe Pain (7 - 10)  polyethylene glycol 3350 17 Gram(s) Oral daily PRN Constipation      ASSESSMENT/PLAN

## 2018-12-23 NOTE — PROGRESS NOTE ADULT - PROBLEM SELECTOR PLAN 4
HbA1C 6.1.  Per Endo- continue off metformin 2/2 kidney function  Continue SSI and monitor Accu-Cheks BID

## 2018-12-23 NOTE — CONSULT NOTE ADULT - REASON FOR ADMISSION
fall resulting in left hip fx requiring hemiarthroplasty, functional deficits
fall resulting in left hip fracture requiring hemiarthroplasty, functional deficits

## 2018-12-23 NOTE — PROGRESS NOTE ADULT - SUBJECTIVE AND OBJECTIVE BOX
This is an 86 year old female with h/o HTN, DM2(a1c 6.1% Dec 2018), HLD, CKD3(baseline 1.3~1.6) who fell on 12/13, noting left hip pain and presenting to [facility]. She was found to have a left hip fracture for which she underwent a hemiarthroplasty by Dr. Brenner. Post operatively, patient found to have bilat lower extremity DVTs and VQ scan Over the weekend, patient found to have bilat DVTs and VQ scan revealed intermediate probability of pulmonary embolus. Patient started on treatement dose Eliquis 10mg BID x7 days, then to decreased to 5mg BID there after.  Also s/p 1 unit PRBCs for anemia and Hen of 7.6. H/h improved and stable.   Patient still requiring min to mod A for bed mobility and min A for transfers and ambulation with RW. Requiring max  assist for LE dressing.   Acute IPR program recommended.   Patient cleared medically for admission to acute IPR here at [facility] on 12/20/18. (20 Dec 2018 15:21)      Subjective  No complaints         PAST MEDICAL & SURGICAL HISTORY:  Anxiety  DVT (deep venous thrombosis)  Pure hypercholesterolemia  Type 2 diabetes mellitus without complication, without long-term current use of insulin  HTN (hypertension)  History of cholecystectomy      MedsMEDICATIONS  (STANDING):  ALPRAZolam 0.25 milliGRAM(s) Oral <User Schedule>  amLODIPine   Tablet 5 milliGRAM(s) Oral daily  apixaban 5 milliGRAM(s) Oral every 12 hours  ascorbic acid 500 milliGRAM(s) Oral two times a day  atorvastatin 10 milliGRAM(s) Oral at bedtime  dextrose 5%. 1000 milliLiter(s) (50 mL/Hr) IV Continuous <Continuous>  dextrose 50% Injectable 12.5 Gram(s) IV Push once  dextrose 50% Injectable 25 Gram(s) IV Push once  dextrose 50% Injectable 25 Gram(s) IV Push once  docusate sodium 100 milliGRAM(s) Oral three times a day  insulin lispro (HumaLOG) corrective regimen sliding scale   SubCutaneous three times a day before meals  lidocaine   Patch 2 Patch Transdermal <User Schedule>  multivitamin 1 Tablet(s) Oral daily  oxyCODONE    IR 5 milliGRAM(s) Oral <User Schedule>  pantoprazole    Tablet 40 milliGRAM(s) Oral before breakfast  senna 2 Tablet(s) Oral at bedtime  zinc sulfate 220 milliGRAM(s) Oral daily    MEDICATIONS  (PRN):  acetaminophen   Tablet .. 650 milliGRAM(s) Oral every 6 hours PRN Mild Pain (1 - 3)  ALPRAZolam 0.25 milliGRAM(s) Oral daily PRN anxiety  dextrose 40% Gel 15 Gram(s) Oral once PRN Blood Glucose LESS THAN 70 milliGRAM(s)/deciliter  glucagon  Injectable 1 milliGRAM(s) IntraMuscular once PRN Glucose LESS THAN 70 milligrams/deciliter  oxyCODONE    IR 5 milliGRAM(s) Oral every 4 hours PRN Moderate Pain (4 - 6)  oxyCODONE    IR 10 milliGRAM(s) Oral every 4 hours PRN Severe Pain (7 - 10)  polyethylene glycol 3350 17 Gram(s) Oral daily PRN Constipation      Vital Signs Last 24 Hrs  T(C): 36.7 (23 Dec 2018 08:20), Max: 36.7 (23 Dec 2018 08:20)  T(F): 98 (23 Dec 2018 08:20), Max: 98 (23 Dec 2018 08:20)  HR: 88 (23 Dec 2018 08:20) (80 - 88)  BP: 115/68 (23 Dec 2018 08:20) (100/63 - 133/75)  BP(mean): --  RR: 15 (23 Dec 2018 08:20) (14 - 15)  SpO2: 94% (23 Dec 2018 08:20) (94% - 97%)  I&O's Summary      PHYSICAL EXAM:  GENERAL: NAD  NECK: Supple  NERVOUS SYSTEM:  awake and alert  HEART: S1s2 NL , RRR  CHEST/LUNG: Clear to percussion bilaterally  ABDOMEN: Soft, Nontender, Nondistended; Bowel sounds present  EXTREMITIES:  trace edema      LABS:  |12-21-18 @ 05:59            9.5<L>  7.7>--------------<252            28.8<L>      143  |  108  |  26<H>  --------------------------<152<H>  4.1  |  9.0  |  1.42<H>    Mg -- / Phos--    PT -- / INR --    PTT --    Lipase --  12-21-18 @ 05:59  CAPILLARY BLOOD GLUCOSE      POCT Blood Glucose.: 159 mg/dL (23 Dec 2018 07:43)  POCT Blood Glucose.: 245 mg/dL (22 Dec 2018 16:51)    Imaging Personally Reviewed:  [ ] YES  [ ] NO      HEALTH ISSUES - PROBLEM Dx:  Hip fracture s/p hemiarthroplasty   PT/OT per rehab  Pain control  IS  Bowel regimen    b/l DVT  Cont Eliquis    PASTORA on CKD3-Cr at baseline  Avoid nephrotoxins    DM2  Cont humalog    HTN  Cont norvasc    Postop anemia  h/h stable        Care Discussed with Consultants/Other Providers [ x] YES  [ ] NO

## 2018-12-24 DIAGNOSIS — S72.145A NONDISPLACED INTERTROCHANTERIC FRACTURE OF LEFT FEMUR, INITIAL ENCOUNTER FOR CLOSED FRACTURE: ICD-10-CM

## 2018-12-24 PROCEDURE — 99233 SBSQ HOSP IP/OBS HIGH 50: CPT

## 2018-12-24 PROCEDURE — 99232 SBSQ HOSP IP/OBS MODERATE 35: CPT

## 2018-12-24 RX ORDER — INSULIN LISPRO 100/ML
VIAL (ML) SUBCUTANEOUS
Qty: 0 | Refills: 0 | Status: DISCONTINUED | OUTPATIENT
Start: 2018-12-24 | End: 2018-12-27

## 2018-12-24 RX ORDER — BENZOCAINE AND MENTHOL 5; 1 G/100ML; G/100ML
1 LIQUID ORAL EVERY 4 HOURS
Qty: 0 | Refills: 0 | Status: DISCONTINUED | OUTPATIENT
Start: 2018-12-24 | End: 2019-01-04

## 2018-12-24 RX ADMIN — LIDOCAINE 2 PATCH: 4 CREAM TOPICAL at 19:35

## 2018-12-24 RX ADMIN — Medication 650 MILLIGRAM(S): at 18:00

## 2018-12-24 RX ADMIN — Medication 100 MILLIGRAM(S): at 05:54

## 2018-12-24 RX ADMIN — APIXABAN 5 MILLIGRAM(S): 2.5 TABLET, FILM COATED ORAL at 05:54

## 2018-12-24 RX ADMIN — ZINC SULFATE TAB 220 MG (50 MG ZINC EQUIVALENT) 220 MILLIGRAM(S): 220 (50 ZN) TAB at 11:44

## 2018-12-24 RX ADMIN — LIDOCAINE 2 PATCH: 4 CREAM TOPICAL at 07:45

## 2018-12-24 RX ADMIN — OXYCODONE HYDROCHLORIDE 5 MILLIGRAM(S): 5 TABLET ORAL at 08:00

## 2018-12-24 RX ADMIN — OXYCODONE HYDROCHLORIDE 5 MILLIGRAM(S): 5 TABLET ORAL at 11:43

## 2018-12-24 RX ADMIN — Medication 650 MILLIGRAM(S): at 17:57

## 2018-12-24 RX ADMIN — Medication 0.25 MILLIGRAM(S): at 07:44

## 2018-12-24 RX ADMIN — Medication 500 MILLIGRAM(S): at 17:20

## 2018-12-24 RX ADMIN — Medication 100 MILLIGRAM(S): at 21:32

## 2018-12-24 RX ADMIN — Medication 1 TABLET(S): at 11:43

## 2018-12-24 RX ADMIN — OXYCODONE HYDROCHLORIDE 5 MILLIGRAM(S): 5 TABLET ORAL at 05:55

## 2018-12-24 RX ADMIN — ATORVASTATIN CALCIUM 10 MILLIGRAM(S): 80 TABLET, FILM COATED ORAL at 21:31

## 2018-12-24 RX ADMIN — AMLODIPINE BESYLATE 5 MILLIGRAM(S): 2.5 TABLET ORAL at 05:54

## 2018-12-24 RX ADMIN — Medication 2: at 17:20

## 2018-12-24 RX ADMIN — PANTOPRAZOLE SODIUM 40 MILLIGRAM(S): 20 TABLET, DELAYED RELEASE ORAL at 05:55

## 2018-12-24 RX ADMIN — Medication 500 MILLIGRAM(S): at 05:55

## 2018-12-24 RX ADMIN — OXYCODONE HYDROCHLORIDE 5 MILLIGRAM(S): 5 TABLET ORAL at 12:07

## 2018-12-24 RX ADMIN — APIXABAN 5 MILLIGRAM(S): 2.5 TABLET, FILM COATED ORAL at 17:20

## 2018-12-24 RX ADMIN — SENNA PLUS 2 TABLET(S): 8.6 TABLET ORAL at 21:31

## 2018-12-24 NOTE — PROGRESS NOTE ADULT - PROBLEM SELECTOR PLAN 4
HbA1C 6.1.  Per Endo- continue off metformin due to kidney function  Continue SSI and monitor Accu-Cheks BID

## 2018-12-24 NOTE — PROGRESS NOTE ADULT - SUBJECTIVE AND OBJECTIVE BOX
HISTORY OF PRESENT ILLNESS  Mrs. More is an 86 year old woman who fell on 12/13, noting left hip pain and presenting to [facility]. She was found to have a left hip fracture for which she underwent a hemiarthroplasty by Dr. Brenner. Post operatively, patient found to have bilat lower extremity DVTs and VQ scan Over the weekend, patient found to have bilat DVTs and VQ scan revealed intermediate probability of pulmonary embolus. Patient started on treatement dose Eliquis 10mg BID x7 days, then to decreased to 5mg BID there after.  Also s/p 1 unit PRBCs for anemia and Hen of 7.6. H/h improved and stable.   Patient was still requiring min to mod A for bed mobility and min A for transfers and ambulation with RW. Requiring max  assist for LE dressing.   Acute IPR program recommended. Patient cleared medically for admission to acute IPR at [facility] on 12/20/18      TODAY'S SUBJECTIVE & REVIEW OF SYMPTOMS  Pt seen and examined.  Slept well overnight only after receiving analgesics.  Notes intermittent left hip pain.  Pain improves with rest and oxycodone and is improving overall.  +BM yesterday.   Denies new or different chest pain, SOB., dyspnea, cough or dysuria.      [X] Constitutional WNL        [X] Cardio WNL              [] Resp WNL  [X] GI WNL                            [X]  WNL                    [] Heme WNL  [X] Endo WNL                       [] Skin WNL                  [] MSK left hip pain   [X] Neuro WNL                     [X] Cognitive WNL         [X] Psych anxiety at times      Vital Signs Last 24 Hrs  T(C): 36.7 (23 Dec 2018 21:00), Max: 36.7 (23 Dec 2018 21:00)  T(F): 98.1 (23 Dec 2018 21:00), Max: 98.1 (23 Dec 2018 21:00)  HR: 93 (24 Dec 2018 07:49) (89 - 93)  BP: 121/72 (24 Dec 2018 07:49) (121/72 - 126/68)  BP(mean): --  RR: 14 (24 Dec 2018 07:49) (14 - 14)  SpO2: 96% (24 Dec 2018 07:49) (96% - 96%)    Physical Exam: Mental Status - Patient is alert, awake, oriented X3. Speech is fluent. Mood and affect  normal.  	No SOB during exam  	Motor Exam -  5/5 with exception of left hip and knee=3-/5  	Sensory    Intact to light touch bilaterally.                            	GENERAL Exam:  NAD 	  	HEENT:  normocephalic, atraumatic		  	LUNGS:	Clear bilaterally	  	HEART:	 Normal S1S2 	  	GI/ ABDOMEN:  Soft  Non tender +BS  	EXTREMITIES:   trace edema bilat LE  	MUSCULOSKELETAL: as above   SKIN:     Aquacel dressing to left hip c/d/i  No erythema noted    FUNCTIONAL STATUS -   Bed mobility:   Transfers: Mod A  Gait - Amb 40' RW min/mod A  ADL's - Eating mod I, grooming sup, UE dress min A, LE dress mod/max A  Functional Transfers - Max A    RECENT LABS/IMAGING                        9.5    7.7   )-----------( 252      ( 21 Dec 2018 05:59 )             28.8     12-21    143  |  108  |  26<H>  ----------------------------<  152<H>  4.1   |  26  |  1.42<H>    Ca    9.0      21 Dec 2018 05:59    TPro  6.0  /  Alb  2.3<L>  /  TBili  0.5  /  DBili  x   /  AST  17  /  ALT  20  /  AlkPhos  46  12-21      CAPILLARY BLOOD GLUCOSE      POCT Blood Glucose.: 148 mg/dL (24 Dec 2018 07:40)  POCT Blood Glucose.: 237 mg/dL (23 Dec 2018 16:46)      MEDICATIONS  (STANDING):  ALPRAZolam 0.25 milliGRAM(s) Oral <User Schedule>  amLODIPine   Tablet 5 milliGRAM(s) Oral daily  apixaban 5 milliGRAM(s) Oral every 12 hours  ascorbic acid 500 milliGRAM(s) Oral two times a day  atorvastatin 10 milliGRAM(s) Oral at bedtime  dextrose 5%. 1000 milliLiter(s) (50 mL/Hr) IV Continuous <Continuous>  dextrose 50% Injectable 12.5 Gram(s) IV Push once  dextrose 50% Injectable 25 Gram(s) IV Push once  dextrose 50% Injectable 25 Gram(s) IV Push once  docusate sodium 100 milliGRAM(s) Oral three times a day  insulin lispro (HumaLOG) corrective regimen sliding scale   SubCutaneous three times a day before meals  lidocaine   Patch 2 Patch Transdermal <User Schedule>  multivitamin 1 Tablet(s) Oral daily  oxyCODONE    IR 5 milliGRAM(s) Oral <User Schedule>  pantoprazole    Tablet 40 milliGRAM(s) Oral before breakfast  senna 2 Tablet(s) Oral at bedtime  zinc sulfate 220 milliGRAM(s) Oral daily    MEDICATIONS  (PRN):  acetaminophen   Tablet .. 650 milliGRAM(s) Oral every 6 hours PRN Mild Pain (1 - 3)  ALPRAZolam 0.25 milliGRAM(s) Oral daily PRN anxiety  dextrose 40% Gel 15 Gram(s) Oral once PRN Blood Glucose LESS THAN 70 milliGRAM(s)/deciliter  glucagon  Injectable 1 milliGRAM(s) IntraMuscular once PRN Glucose LESS THAN 70 milligrams/deciliter  oxyCODONE    IR 5 milliGRAM(s) Oral every 4 hours PRN Moderate Pain (4 - 6)  oxyCODONE    IR 10 milliGRAM(s) Oral every 4 hours PRN Severe Pain (7 - 10)  polyethylene glycol 3350 17 Gram(s) Oral daily PRN Constipation      ASSESSMENT/PLAN

## 2018-12-24 NOTE — PROGRESS NOTE ADULT - SUBJECTIVE AND OBJECTIVE BOX
Patient is a 86 year old  Female who presents with a chief complaint of fall resulting in left hip fracture requiring hemiarthroplasty, functional deficits (23 Dec 2018 11:30)    Patient reports dull intermittent mild to moderate left hip pain, worse with movement and better with pain medication. Patient denies any other complaints at time of assessment.       MEDICATIONS  (STANDING):  ALPRAZolam 0.25 milliGRAM(s) Oral <User Schedule>  amLODIPine   Tablet 5 milliGRAM(s) Oral daily  apixaban 5 milliGRAM(s) Oral every 12 hours  ascorbic acid 500 milliGRAM(s) Oral two times a day  atorvastatin 10 milliGRAM(s) Oral at bedtime  dextrose 5%. 1000 milliLiter(s) (50 mL/Hr) IV Continuous <Continuous>  dextrose 50% Injectable 12.5 Gram(s) IV Push once  dextrose 50% Injectable 25 Gram(s) IV Push once  dextrose 50% Injectable 25 Gram(s) IV Push once  docusate sodium 100 milliGRAM(s) Oral three times a day  insulin lispro (HumaLOG) corrective regimen sliding scale   SubCutaneous three times a day before meals  lidocaine   Patch 2 Patch Transdermal <User Schedule>  multivitamin 1 Tablet(s) Oral daily  oxyCODONE    IR 5 milliGRAM(s) Oral <User Schedule>  pantoprazole    Tablet 40 milliGRAM(s) Oral before breakfast  senna 2 Tablet(s) Oral at bedtime  zinc sulfate 220 milliGRAM(s) Oral daily    MEDICATIONS  (PRN):  acetaminophen   Tablet .. 650 milliGRAM(s) Oral every 6 hours PRN Mild Pain (1 - 3)  ALPRAZolam 0.25 milliGRAM(s) Oral daily PRN anxiety  dextrose 40% Gel 15 Gram(s) Oral once PRN Blood Glucose LESS THAN 70 milliGRAM(s)/deciliter  glucagon  Injectable 1 milliGRAM(s) IntraMuscular once PRN Glucose LESS THAN 70 milligrams/deciliter  oxyCODONE    IR 5 milliGRAM(s) Oral every 4 hours PRN Moderate Pain (4 - 6)  oxyCODONE    IR 10 milliGRAM(s) Oral every 4 hours PRN Severe Pain (7 - 10)  polyethylene glycol 3350 17 Gram(s) Oral daily PRN Constipation      REVIEW OF SYSTEMS:  CONSTITUTIONAL: No fever, weight loss; has mild fatigue  EYES: No eye pain, visual disturbances, or discharge  ENMT:  No difficulty hearing, tinnitus, vertigo; No sinus or throat pain  NECK: No pain or stiffness  RESPIRATORY: No cough, wheezing, chills or hemoptysis; No shortness of breath  CARDIOVASCULAR: No chest pain, palpitations, dizziness, or leg swelling  GASTROINTESTINAL: No abdominal or epigastric pain. No nausea, vomiting, or hematemesis; No diarrhea or constipation. No melena or hematochezia.  GENITOURINARY: No dysuria, frequency, hematuria, or incontinence  NEUROLOGICAL: No headaches, memory loss, loss of strength, numbness, or tremors  SKIN: No itching, burning, rashes, or lesions   ENDOCRINE: No heat or cold intolerance; No hair loss  MUSCULOSKELETAL: intermittent left hip pain, No other joint pain or swelling; No muscle, back, or extremity pain  PSYCHIATRIC: No depression, anxiety, mood swings, or difficulty sleeping  HEME/LYMPH: No easy bruising, or bleeding gums  ALLERGY AND IMMUNOLOGIC: No hives or eczema      PHYSICAL EXAM:  T(C): 36.7 (12-23-18 @ 21:00), Max: 36.7 (12-23-18 @ 21:00)  HR: 93 (12-24-18 @ 07:49) (89 - 93)  BP: 121/72 (12-24-18 @ 07:49) (121/72 - 126/68)  RR: 14 (12-24-18 @ 07:49) (14 - 14)  SpO2: 96% (12-24-18 @ 07:49) (96% - 96%)      GENERAL: NAD, well-groomed, well-developed  HEAD:  Atraumatic, Normocephalic  EYES: EOMI, PERRL, conjunctiva and sclera clear  ENMT: Moist mucous membranes  NECK: Supple, No JVD, Normal thyroid  NERVOUS SYSTEM:  Alert & Oriented X3, no focal deficit  CHEST/LUNG: Clear to ascultation bilaterally; No rales, rhonchi, wheezing, or rubs  HEART: Regular rate and rhythm; No murmurs, rubs, or gallops  ABDOMEN: Soft, Nontender, Nondistended; Bowel sounds present  EXTREMITIES:  2+ Peripheral Pulses, No clubbing, cyanosis, or edema  SKIN: left hip incision healing well, No rash      LABS: none today      CAPILLARY BLOOD GLUCOSE  POCT Blood Glucose.: 148 mg/dL (24 Dec 2018 07:40)  POCT Blood Glucose.: 237 mg/dL (23 Dec 2018 16:46)            RADIOLOGY & ADDITIONAL TESTS:    Imaging Personally Reviewed:  [x] YES  [ ] NO    Consultant(s) Notes Reviewed:  [x] YES  [ ] NO    Care Discussed with Consultants/Other Providers [x] YES  [ ] NO

## 2018-12-24 NOTE — PROGRESS NOTE ADULT - PROBLEM SELECTOR PLAN 1
s/p hemiarthroplasty with functional deficits  Continue PT/OT   MVI, zinc and vit c for healing  Add Scheduled Oxycodone prior to therapy (0600 and 1200)   Oxycodone [id number] prn, tylenol prn, ice packs prn, and lidocaine patches for pain management

## 2018-12-24 NOTE — PROGRESS NOTE ADULT - ASSESSMENT
86 year old female from home PMH HTN, DMII, HLD, presented with fall admitted for acute left hip fracture.    >Acute left hip fracture: underwent a hemiarthroplasty by Dr. Brenner, here for comprehensive PT and OT program   pain control, MVI, zinc and vit c for healing, f/u orthopedics as outpatient    >Left hip pain secondary to above: pain control, Tylenol prn, Oxycodone [id number] prn, ice packs prn, and lidocaine patches for pain management.     >DVT (deep venous thrombosis).  Plan: Continue eliquis 10mg q12 for total of 7 days (ending on 12/22), start maintenance dose of Eliquis 5mg q12 starting on 12/23  F/u Vascular consult     >Pulmonary emboli.  Plan: Continue eliquis, Oxygen prn.     >CKD III: baseline Cr is 1.5, continue to encourage hydration, hold ARB    >Anterior mediastinal rim enhancing mass: appears stable from 2016, consulted [facility], needs outpt f/u at discharge, not causing any acute issues currently     >DMII: continue SSI, check HcA1C - 6.1%, monitor accuchecks, currently at goal, ISS, diabetic diet    >HLD: continue lipitor.    >HTN: BP controlled, continue Norvasc, hold ARB     >Anxiety.  Plan: Continue xanax once daily.    >DVT PPX: patient is on Eliquis

## 2018-12-25 PROCEDURE — 99233 SBSQ HOSP IP/OBS HIGH 50: CPT

## 2018-12-25 PROCEDURE — 99232 SBSQ HOSP IP/OBS MODERATE 35: CPT | Mod: GC

## 2018-12-25 RX ADMIN — Medication 100 MILLIGRAM(S): at 05:38

## 2018-12-25 RX ADMIN — LIDOCAINE 2 PATCH: 4 CREAM TOPICAL at 17:54

## 2018-12-25 RX ADMIN — LIDOCAINE 2 PATCH: 4 CREAM TOPICAL at 09:44

## 2018-12-25 RX ADMIN — ATORVASTATIN CALCIUM 10 MILLIGRAM(S): 80 TABLET, FILM COATED ORAL at 21:30

## 2018-12-25 RX ADMIN — OXYCODONE HYDROCHLORIDE 5 MILLIGRAM(S): 5 TABLET ORAL at 12:32

## 2018-12-25 RX ADMIN — Medication 1 TABLET(S): at 11:46

## 2018-12-25 RX ADMIN — Medication 650 MILLIGRAM(S): at 07:46

## 2018-12-25 RX ADMIN — OXYCODONE HYDROCHLORIDE 10 MILLIGRAM(S): 5 TABLET ORAL at 21:30

## 2018-12-25 RX ADMIN — Medication 1: at 17:07

## 2018-12-25 RX ADMIN — Medication 100 MILLIGRAM(S): at 14:16

## 2018-12-25 RX ADMIN — Medication 500 MILLIGRAM(S): at 17:06

## 2018-12-25 RX ADMIN — ZINC SULFATE TAB 220 MG (50 MG ZINC EQUIVALENT) 220 MILLIGRAM(S): 220 (50 ZN) TAB at 11:46

## 2018-12-25 RX ADMIN — OXYCODONE HYDROCHLORIDE 5 MILLIGRAM(S): 5 TABLET ORAL at 11:45

## 2018-12-25 RX ADMIN — LIDOCAINE 2 PATCH: 4 CREAM TOPICAL at 05:37

## 2018-12-25 RX ADMIN — OXYCODONE HYDROCHLORIDE 10 MILLIGRAM(S): 5 TABLET ORAL at 20:24

## 2018-12-25 RX ADMIN — OXYCODONE HYDROCHLORIDE 5 MILLIGRAM(S): 5 TABLET ORAL at 05:39

## 2018-12-25 RX ADMIN — PANTOPRAZOLE SODIUM 40 MILLIGRAM(S): 20 TABLET, DELAYED RELEASE ORAL at 05:38

## 2018-12-25 RX ADMIN — APIXABAN 5 MILLIGRAM(S): 2.5 TABLET, FILM COATED ORAL at 17:07

## 2018-12-25 RX ADMIN — Medication 650 MILLIGRAM(S): at 08:30

## 2018-12-25 RX ADMIN — OXYCODONE HYDROCHLORIDE 5 MILLIGRAM(S): 5 TABLET ORAL at 06:39

## 2018-12-25 RX ADMIN — AMLODIPINE BESYLATE 5 MILLIGRAM(S): 2.5 TABLET ORAL at 05:38

## 2018-12-25 RX ADMIN — APIXABAN 5 MILLIGRAM(S): 2.5 TABLET, FILM COATED ORAL at 05:38

## 2018-12-25 RX ADMIN — Medication 0.25 MILLIGRAM(S): at 07:49

## 2018-12-25 RX ADMIN — Medication 500 MILLIGRAM(S): at 05:38

## 2018-12-25 NOTE — PROGRESS NOTE ADULT - ASSESSMENT
86 year old female from home PMH HTN, DMII, HLD, presented with fall admitted for acute left hip fracture.    >Acute left hip fracture: underwent a hemiarthroplasty by Dr. Brenner, here for comprehensive PT and OT program   pain control, MVI, zinc and vit c for healing, f/u orthopedics as outpatient    >Left hip pain secondary to above: pain control, Tylenol prn, Oxycodone [id number] prn, ice packs prn, and lidocaine patches for pain management.     >DVT (deep venous thrombosis): Continue eliquis 10mg q12 for total of 7 days (ending on 12/22), start maintenance dose of Eliquis 5mg q12 starting on 12/23  Vascular consult recommends to continue Eliquis    >Pulmonary emboli: Continue eliquis, Oxygen prn.     >CKD III: baseline Cr is 1.5, continue to encourage hydration, hold ARB    >Anterior mediastinal rim enhancing mass: appears stable from 2016, consulted [facility], needs outpt f/u at discharge, not causing any acute issues currently     >DMII: continue SSI, check HcA1C - 6.1%, monitor accuchecks, currently at goal, ISS, diabetic diet    >HLD: continue lipitor.    >HTN: BP controlled, continue Norvasc, hold ARB     >Anxiety: stable, Continue xanax once daily, f/u psychiatry.    >DVT PPX: patient is on Eliquis

## 2018-12-25 NOTE — PROGRESS NOTE ADULT - SUBJECTIVE AND OBJECTIVE BOX
Patient is a 86 year old  Female who presents with a chief complaint of fall resulting in left hip fracture requiring hemiarthroplasty, functional deficits (25 Dec 2018 08:53)    Patient reports dull intermittent mild left hip pain, worse with movement and better with pain medication. Patient denies any other complaints at time of assessment.     MEDICATIONS  (STANDING):  ALPRAZolam 0.25 milliGRAM(s) Oral <User Schedule>  amLODIPine   Tablet 5 milliGRAM(s) Oral daily  apixaban 5 milliGRAM(s) Oral every 12 hours  ascorbic acid 500 milliGRAM(s) Oral two times a day  atorvastatin 10 milliGRAM(s) Oral at bedtime  dextrose 5%. 1000 milliLiter(s) (50 mL/Hr) IV Continuous <Continuous>  dextrose 50% Injectable 12.5 Gram(s) IV Push once  dextrose 50% Injectable 25 Gram(s) IV Push once  dextrose 50% Injectable 25 Gram(s) IV Push once  docusate sodium 100 milliGRAM(s) Oral three times a day  insulin lispro (HumaLOG) corrective regimen sliding scale   SubCutaneous two times a day before meals  lidocaine   Patch 2 Patch Transdermal <User Schedule>  multivitamin 1 Tablet(s) Oral daily  oxyCODONE    IR 5 milliGRAM(s) Oral <User Schedule>  pantoprazole    Tablet 40 milliGRAM(s) Oral before breakfast  senna 2 Tablet(s) Oral at bedtime  zinc sulfate 220 milliGRAM(s) Oral daily    MEDICATIONS  (PRN):  acetaminophen   Tablet .. 650 milliGRAM(s) Oral every 6 hours PRN Mild Pain (1 - 3)  ALPRAZolam 0.25 milliGRAM(s) Oral daily PRN anxiety  benzocaine 15 mG/menthol 3.6 mG Lozenge 1 Lozenge Oral every 4 hours PRN Sore Throat  dextrose 40% Gel 15 Gram(s) Oral once PRN Blood Glucose LESS THAN 70 milliGRAM(s)/deciliter  glucagon  Injectable 1 milliGRAM(s) IntraMuscular once PRN Glucose LESS THAN 70 milligrams/deciliter  oxyCODONE    IR 5 milliGRAM(s) Oral every 4 hours PRN Moderate Pain (4 - 6)  oxyCODONE    IR 10 milliGRAM(s) Oral every 4 hours PRN Severe Pain (7 - 10)  polyethylene glycol 3350 17 Gram(s) Oral daily PRN Constipation      REVIEW OF SYSTEMS:  CONSTITUTIONAL: No fever, weight loss; has mild fatigue  EYES: No eye pain, visual disturbances, or discharge  ENMT:  No difficulty hearing, tinnitus, vertigo; No sinus or throat pain  NECK: No pain or stiffness  RESPIRATORY: No cough, wheezing, chills or hemoptysis; No shortness of breath  CARDIOVASCULAR: No chest pain, palpitations, dizziness, or leg swelling  GASTROINTESTINAL: No abdominal or epigastric pain. No nausea, vomiting, or hematemesis; No diarrhea or constipation. No melena or hematochezia.  GENITOURINARY: No dysuria, frequency, hematuria, or incontinence  NEUROLOGICAL: No headaches, memory loss, loss of strength, numbness, or tremors  SKIN: No itching, burning, rashes, or lesions   ENDOCRINE: No heat or cold intolerance; No hair loss  MUSCULOSKELETAL: intermittent left hip pain, No other joint pain or swelling; No muscle, back, or extremity pain  PSYCHIATRIC: No depression, anxiety, mood swings, or difficulty sleeping  HEME/LYMPH: No easy bruising, or bleeding gums  ALLERGY AND IMMUNOLOGIC: No hives or eczema        PHYSICAL EXAM:  T(C): 36.4 (12-25-18 @ 07:25), Max: 36.8 (12-24-18 @ 20:14)  HR: 92 (12-25-18 @ 07:25) (86 - 92)  BP: 128/76 (12-25-18 @ 07:25) (113/72 - 128/76)  RR: 14 (12-25-18 @ 07:25) (14 - 14)  SpO2: 94% (12-25-18 @ 07:25) (94% - 95%)    GENERAL: NAD, well-groomed, well-developed  HEAD:  Atraumatic, Normocephalic  EYES: EOMI, PERRL, conjunctiva and sclera clear  ENMT: Moist mucous membranes  NECK: Supple, No JVD, Normal thyroid  NERVOUS SYSTEM:  Alert & Oriented X3, no focal deficit  CHEST/LUNG: Clear to ascultation bilaterally; No rales, rhonchi, wheezing, or rubs  HEART: Regular rate and rhythm; No murmurs, rubs, or gallops  ABDOMEN: Soft, Nontender, Nondistended; Bowel sounds present  EXTREMITIES:  2+ Peripheral Pulses, No clubbing, cyanosis, or edema  SKIN: left hip incision healing well, No rash      LABS: none today        CAPILLARY BLOOD GLUCOSE  POCT Blood Glucose.: 146 mg/dL (25 Dec 2018 07:39)  POCT Blood Glucose.: 207 mg/dL (24 Dec 2018 16:28)            RADIOLOGY & ADDITIONAL TESTS:    Imaging Personally Reviewed:  [x] YES  [ ] NO    Consultant(s) Notes Reviewed:  [x] YES  [ ] NO    Care Discussed with Consultants/Other Providers [x] YES  [ ] NO

## 2018-12-25 NOTE — PROGRESS NOTE ADULT - SUBJECTIVE AND OBJECTIVE BOX
Cc: Gait dysfunction, Closed nondisplaced intertrochanteric fracture of femur    HPI: Patient with no new medical issues today.  Pain controlled, no chest pain, no N/V, no Fevers/Chills. No other new ROS  Has been tolerating rehabilitation program. Slept well     MEDICATIONS  (STANDING):  ALPRAZolam 0.25 milliGRAM(s) Oral <User Schedule>  amLODIPine   Tablet 5 milliGRAM(s) Oral daily  apixaban 5 milliGRAM(s) Oral every 12 hours  ascorbic acid 500 milliGRAM(s) Oral two times a day  atorvastatin 10 milliGRAM(s) Oral at bedtime  dextrose 5%. 1000 milliLiter(s) (50 mL/Hr) IV Continuous <Continuous>  dextrose 50% Injectable 12.5 Gram(s) IV Push once  dextrose 50% Injectable 25 Gram(s) IV Push once  dextrose 50% Injectable 25 Gram(s) IV Push once  docusate sodium 100 milliGRAM(s) Oral three times a day  insulin lispro (HumaLOG) corrective regimen sliding scale   SubCutaneous two times a day before meals  lidocaine   Patch 2 Patch Transdermal <User Schedule>  multivitamin 1 Tablet(s) Oral daily  oxyCODONE    IR 5 milliGRAM(s) Oral <User Schedule>  pantoprazole    Tablet 40 milliGRAM(s) Oral before breakfast  senna 2 Tablet(s) Oral at bedtime  zinc sulfate 220 milliGRAM(s) Oral daily    MEDICATIONS  (PRN):  acetaminophen   Tablet .. 650 milliGRAM(s) Oral every 6 hours PRN Mild Pain (1 - 3)  ALPRAZolam 0.25 milliGRAM(s) Oral daily PRN anxiety  benzocaine 15 mG/menthol 3.6 mG Lozenge 1 Lozenge Oral every 4 hours PRN Sore Throat  dextrose 40% Gel 15 Gram(s) Oral once PRN Blood Glucose LESS THAN 70 milliGRAM(s)/deciliter  glucagon  Injectable 1 milliGRAM(s) IntraMuscular once PRN Glucose LESS THAN 70 milligrams/deciliter  oxyCODONE    IR 5 milliGRAM(s) Oral every 4 hours PRN Moderate Pain (4 - 6)  oxyCODONE    IR 10 milliGRAM(s) Oral every 4 hours PRN Severe Pain (7 - 10)  polyethylene glycol 3350 17 Gram(s) Oral daily PRN Constipation    Vital Signs Last 24 Hrs  T(C): 36.4 (25 Dec 2018 07:25), Max: 36.8 (24 Dec 2018 20:14)  T(F): 97.6 (25 Dec 2018 07:25), Max: 98.2 (24 Dec 2018 20:14)  HR: 92 (25 Dec 2018 07:25) (86 - 92)  BP: 128/76 (25 Dec 2018 07:25) (113/72 - 128/76)  BP(mean): --  RR: 14 (25 Dec 2018 07:25) (14 - 14)  SpO2: 94% (25 Dec 2018 07:25) (94% - 95%)    In NAD  HEENT- EOMI  Heart- RRR, S1S2  Lungs- CTA bl.  Abd- + BS, NT  Ext- No calf pain  Neuro- Exam unchanged  incision with dressing c/d/i         Imp: Patient with diagnosis of          admitted for comprehensive acute rehabilitation.    Plan:  - Continue PT/OT/SLP  - DVT prophylaxis  - Skin- Turn q2h, check skin daily  - Continue current medications; patient medically stable.   -Active issues- none  - Patient is stable to continue current rehabilitation program.

## 2018-12-26 LAB
ANION GAP SERPL CALC-SCNC: 10 MMOL/L — SIGNIFICANT CHANGE UP (ref 5–17)
BUN SERPL-MCNC: 28 MG/DL — HIGH (ref 7–23)
CALCIUM SERPL-MCNC: 8.8 MG/DL — SIGNIFICANT CHANGE UP (ref 8.4–10.5)
CHLORIDE SERPL-SCNC: 106 MMOL/L — SIGNIFICANT CHANGE UP (ref 96–108)
CO2 SERPL-SCNC: 24 MMOL/L — SIGNIFICANT CHANGE UP (ref 22–31)
CREAT SERPL-MCNC: 1.55 MG/DL — HIGH (ref 0.5–1.3)
GLUCOSE SERPL-MCNC: 132 MG/DL — HIGH (ref 70–99)
HCT VFR BLD CALC: 26 % — LOW (ref 34.5–45)
HGB BLD-MCNC: 8.6 G/DL — LOW (ref 11.5–15.5)
MCHC RBC-ENTMCNC: 30.8 PG — SIGNIFICANT CHANGE UP (ref 27–34)
MCHC RBC-ENTMCNC: 33.3 GM/DL — SIGNIFICANT CHANGE UP (ref 32–36)
MCV RBC AUTO: 92.5 FL — SIGNIFICANT CHANGE UP (ref 80–100)
PLATELET # BLD AUTO: 341 K/UL — SIGNIFICANT CHANGE UP (ref 150–400)
POTASSIUM SERPL-MCNC: 4.3 MMOL/L — SIGNIFICANT CHANGE UP (ref 3.5–5.3)
POTASSIUM SERPL-SCNC: 4.3 MMOL/L — SIGNIFICANT CHANGE UP (ref 3.5–5.3)
RBC # BLD: 2.8 M/UL — LOW (ref 3.8–5.2)
RBC # FLD: 12.6 % — SIGNIFICANT CHANGE UP (ref 10.3–14.5)
SODIUM SERPL-SCNC: 140 MMOL/L — SIGNIFICANT CHANGE UP (ref 135–145)
WBC # BLD: 8.6 K/UL — SIGNIFICANT CHANGE UP (ref 3.8–10.5)
WBC # FLD AUTO: 8.6 K/UL — SIGNIFICANT CHANGE UP (ref 3.8–10.5)

## 2018-12-26 PROCEDURE — 99232 SBSQ HOSP IP/OBS MODERATE 35: CPT

## 2018-12-26 RX ORDER — DOCUSATE SODIUM 100 MG
100 CAPSULE ORAL
Qty: 0 | Refills: 0 | Status: DISCONTINUED | OUTPATIENT
Start: 2018-12-26 | End: 2019-01-04

## 2018-12-26 RX ADMIN — LIDOCAINE 2 PATCH: 4 CREAM TOPICAL at 20:00

## 2018-12-26 RX ADMIN — Medication 0.25 MILLIGRAM(S): at 07:54

## 2018-12-26 RX ADMIN — OXYCODONE HYDROCHLORIDE 5 MILLIGRAM(S): 5 TABLET ORAL at 07:15

## 2018-12-26 RX ADMIN — LIDOCAINE 2 PATCH: 4 CREAM TOPICAL at 18:22

## 2018-12-26 RX ADMIN — ZINC SULFATE TAB 220 MG (50 MG ZINC EQUIVALENT) 220 MILLIGRAM(S): 220 (50 ZN) TAB at 11:16

## 2018-12-26 RX ADMIN — APIXABAN 5 MILLIGRAM(S): 2.5 TABLET, FILM COATED ORAL at 06:30

## 2018-12-26 RX ADMIN — LIDOCAINE 2 PATCH: 4 CREAM TOPICAL at 07:06

## 2018-12-26 RX ADMIN — ATORVASTATIN CALCIUM 10 MILLIGRAM(S): 80 TABLET, FILM COATED ORAL at 21:14

## 2018-12-26 RX ADMIN — OXYCODONE HYDROCHLORIDE 5 MILLIGRAM(S): 5 TABLET ORAL at 06:31

## 2018-12-26 RX ADMIN — APIXABAN 5 MILLIGRAM(S): 2.5 TABLET, FILM COATED ORAL at 17:28

## 2018-12-26 RX ADMIN — Medication 500 MILLIGRAM(S): at 06:30

## 2018-12-26 RX ADMIN — OXYCODONE HYDROCHLORIDE 10 MILLIGRAM(S): 5 TABLET ORAL at 21:14

## 2018-12-26 RX ADMIN — OXYCODONE HYDROCHLORIDE 10 MILLIGRAM(S): 5 TABLET ORAL at 22:00

## 2018-12-26 RX ADMIN — AMLODIPINE BESYLATE 5 MILLIGRAM(S): 2.5 TABLET ORAL at 06:30

## 2018-12-26 RX ADMIN — PANTOPRAZOLE SODIUM 40 MILLIGRAM(S): 20 TABLET, DELAYED RELEASE ORAL at 06:30

## 2018-12-26 RX ADMIN — Medication 100 MILLIGRAM(S): at 17:29

## 2018-12-26 RX ADMIN — Medication 500 MILLIGRAM(S): at 17:28

## 2018-12-26 RX ADMIN — OXYCODONE HYDROCHLORIDE 5 MILLIGRAM(S): 5 TABLET ORAL at 17:25

## 2018-12-26 RX ADMIN — Medication 1 TABLET(S): at 11:17

## 2018-12-26 RX ADMIN — OXYCODONE HYDROCHLORIDE 5 MILLIGRAM(S): 5 TABLET ORAL at 14:11

## 2018-12-26 NOTE — PROGRESS NOTE ADULT - PROBLEM SELECTOR PLAN 1
s/p hemiarthroplasty with functional deficits  Continue PT/OT   MVI, zinc and vit c for healing  Add Scheduled Oxycodone prior to therapy (0600 and 1200)   Oxycodone [id number] prn, tylenol prn, ice packs prn, and lidocaine patches for pain management

## 2018-12-26 NOTE — PROGRESS NOTE ADULT - SUBJECTIVE AND OBJECTIVE BOX
HISTORY OF PRESENT ILLNESS  Mrs. More is an 86 year old woman who fell on 12/13, noting left hip pain and presenting to [facility]. She was found to have a left hip fracture for which she underwent a hemiarthroplasty by Dr. Brenner. Post operatively, patient found to have bilat lower extremity DVTs and VQ scan Over the weekend, patient found to have bilat DVTs and VQ scan revealed intermediate probability of pulmonary embolus. Patient started on treatement dose Eliquis 10mg BID x7 days, then to decreased to 5mg BID there after.  Also s/p 1 unit PRBCs for anemia and Hen of 7.6. H/h improved and stable.   Patient was still requiring min to mod A for bed mobility and min A for transfers and ambulation with RW. Requiring max  assist for LE dressing.   Acute IPR program recommended. Patient cleared medically for admission to acute IPR at [facility] on 12/20/18      TODAY'S SUBJECTIVE & REVIEW OF SYMPTOMS  Pt seen and examined.  Pt slept well after receiving oxycodone.  Notes intermittent left hip pain.  +BM yesterday.   Denies chest pain, SOB., dyspnea, cough or dysuria.      [X] Constitutional WNL        [X] Cardio WNL              [] Resp WNL  [X] GI WNL                            [X]  WNL                    [] Heme WNL  [X] Endo WNL                       [] Skin WNL                  [] MSK left hip pain   [X] Neuro WNL                     [X] Cognitive WNL         [X] Psych anxiety at times      Vital Signs Last 24 Hrs  T(C): 36.5 (26 Dec 2018 07:46), Max: 36.7 (25 Dec 2018 23:00)  T(F): 97.7 (26 Dec 2018 07:46), Max: 98.1 (25 Dec 2018 23:00)  HR: 92 (26 Dec 2018 07:46) (86 - 93)  BP: 117/70 (26 Dec 2018 07:46) (115/60 - 123/72)  BP(mean): --  RR: 15 (26 Dec 2018 07:46) (14 - 15)  SpO2: 94% (26 Dec 2018 07:46) (94% - 95%)    Physical Exam: Mental Status - Patient is alert, awake, oriented X3. Speech is fluent. Mood and affect  normal.  	No SOB during exam  	Motor Exam -  5/5 with exception of left hip and knee=3-/5  	Sensory    Intact to light touch bilaterally.                            	GENERAL Exam:  NAD 	  	HEENT:  normocephalic, atraumatic		  	LUNGS:	Clear bilaterally	  	HEART:	 Normal S1S2 	  	GI/ ABDOMEN:  Soft  Non tender +BS  	EXTREMITIES:   trace edema bilat LE  	MUSCULOSKELETAL: as above   SKIN:     Aquacel dressing to left hip removed; incision with staples c/d/i  No erythema noted    FUNCTIONAL STATUS -   Bed mobility: Mod A  Transfers: Min A  Gait - Amb 50' RW min A  Stairs-Negotiated 4 steps 2 HR mod A  ADL's - Eating mod I, grooming sup, UE dress min A, LE dress mod/max A  Functional Transfers - Max A    RECENT LABS/IMAGING                          8.6    8.6   )-----------( 341      ( 26 Dec 2018 07:17 )             26.0                           9.5    7.7   )-----------( 252      ( 21 Dec 2018 05:59 )             28.8     12-26    140  |  106  |  28<H>  ----------------------------<  132<H>  4.3   |  24  |  1.55<H>    Ca    8.8      26 Dec 2018 07:17        12-21    143  |  108  |  26<H>  ----------------------------<  152<H>  4.1   |  26  |  1.42<H>    Ca    9.0      21 Dec 2018 05:59    TPro  6.0  /  Alb  2.3<L>  /  TBili  0.5  /  DBili  x   /  AST  17  /  ALT  20  /  AlkPhos  46  12-21      CAPILLARY BLOOD GLUCOSE      POCT Blood Glucose.: 149 mg/dL (26 Dec 2018 07:50)  POCT Blood Glucose.: 162 mg/dL (25 Dec 2018 16:52)        MEDICATIONS  (STANDING):  ALPRAZolam 0.25 milliGRAM(s) Oral <User Schedule>  amLODIPine   Tablet 5 milliGRAM(s) Oral daily  apixaban 5 milliGRAM(s) Oral every 12 hours  ascorbic acid 500 milliGRAM(s) Oral two times a day  atorvastatin 10 milliGRAM(s) Oral at bedtime  dextrose 5%. 1000 milliLiter(s) (50 mL/Hr) IV Continuous <Continuous>  dextrose 50% Injectable 12.5 Gram(s) IV Push once  dextrose 50% Injectable 25 Gram(s) IV Push once  dextrose 50% Injectable 25 Gram(s) IV Push once  docusate sodium 100 milliGRAM(s) Oral two times a day  insulin lispro (HumaLOG) corrective regimen sliding scale   SubCutaneous two times a day before meals  lidocaine   Patch 2 Patch Transdermal <User Schedule>  multivitamin 1 Tablet(s) Oral daily  oxyCODONE    IR 5 milliGRAM(s) Oral <User Schedule>  pantoprazole    Tablet 40 milliGRAM(s) Oral before breakfast  senna 2 Tablet(s) Oral at bedtime  zinc sulfate 220 milliGRAM(s) Oral daily    MEDICATIONS  (PRN):  acetaminophen   Tablet .. 650 milliGRAM(s) Oral every 6 hours PRN Mild Pain (1 - 3)  ALPRAZolam 0.25 milliGRAM(s) Oral daily PRN anxiety  benzocaine 15 mG/menthol 3.6 mG Lozenge 1 Lozenge Oral every 4 hours PRN Sore Throat  dextrose 40% Gel 15 Gram(s) Oral once PRN Blood Glucose LESS THAN 70 milliGRAM(s)/deciliter  glucagon  Injectable 1 milliGRAM(s) IntraMuscular once PRN Glucose LESS THAN 70 milligrams/deciliter  oxyCODONE    IR 5 milliGRAM(s) Oral every 4 hours PRN Moderate Pain (4 - 6)  oxyCODONE    IR 10 milliGRAM(s) Oral every 4 hours PRN Severe Pain (7 - 10)  polyethylene glycol 3350 17 Gram(s) Oral daily PRN Constipation      ASSESSMENT/PLAN

## 2018-12-26 NOTE — PROGRESS NOTE ADULT - PROBLEM SELECTOR PLAN 4
HbA1C 6.1.  Per Endo- continue off metformin due to kidney function  Continue SSI and monitor Accu-Cheks qam

## 2018-12-26 NOTE — CHART NOTE - NSCHARTNOTEFT_GEN_A_CORE
Nutrition Follow Up Note  Hospital Course (Per Electronic Medical Record):   Source: Medical Record [X] Patient [X] Family [ ]         Diet:  Consistent Carbohydrate Diet w/ Thin Liquids   Tolerates Diet Well   No Chewing/Swallowing Difficulties   No Recent Nausea, Vomiting, Diarrhea or Constipation   on Glucerna 8oz PO BID - Takes Well  Educated on Need for Supplementation & PO Intake  Consumes % of Meals (as Per Documentation) - States Fair Intake    Enteral/Parenteral Nutrition: N/A    Current Weight: 144.4lb on 12/20  Obtain New Weight   Obtain Weights Weekly     Pertinent Medications: MEDICATIONS  (STANDING):  ALPRAZolam 0.25 milliGRAM(s) Oral <User Schedule>  amLODIPine   Tablet 5 milliGRAM(s) Oral daily  apixaban 5 milliGRAM(s) Oral every 12 hours  ascorbic acid 500 milliGRAM(s) Oral two times a day  atorvastatin 10 milliGRAM(s) Oral at bedtime  dextrose 5%. 1000 milliLiter(s) (50 mL/Hr) IV Continuous <Continuous>  dextrose 50% Injectable 12.5 Gram(s) IV Push once  dextrose 50% Injectable 25 Gram(s) IV Push once  dextrose 50% Injectable 25 Gram(s) IV Push once  docusate sodium 100 milliGRAM(s) Oral three times a day  insulin lispro (HumaLOG) corrective regimen sliding scale   SubCutaneous two times a day before meals  lidocaine   Patch 2 Patch Transdermal <User Schedule>  multivitamin 1 Tablet(s) Oral daily  oxyCODONE    IR 5 milliGRAM(s) Oral <User Schedule>  pantoprazole    Tablet 40 milliGRAM(s) Oral before breakfast  senna 2 Tablet(s) Oral at bedtime  zinc sulfate 220 milliGRAM(s) Oral daily    MEDICATIONS  (PRN):  acetaminophen   Tablet .. 650 milliGRAM(s) Oral every 6 hours PRN Mild Pain (1 - 3)  ALPRAZolam 0.25 milliGRAM(s) Oral daily PRN anxiety  benzocaine 15 mG/menthol 3.6 mG Lozenge 1 Lozenge Oral every 4 hours PRN Sore Throat  dextrose 40% Gel 15 Gram(s) Oral once PRN Blood Glucose LESS THAN 70 milliGRAM(s)/deciliter  glucagon  Injectable 1 milliGRAM(s) IntraMuscular once PRN Glucose LESS THAN 70 milligrams/deciliter  oxyCODONE    IR 5 milliGRAM(s) Oral every 4 hours PRN Moderate Pain (4 - 6)  oxyCODONE    IR 10 milliGRAM(s) Oral every 4 hours PRN Severe Pain (7 - 10)  polyethylene glycol 3350 17 Gram(s) Oral daily PRN Constipation      Pertinent Labs:  12-26 Na140 mmol/L Glu 132 mg/dL<H> K+ 4.3 mmol/L Cr  1.55 mg/dL<H> BUN 28 mg/dL<H> 12-21 Alb 2.3 g/dL<L> 12-15 [id number] 6.1 %<H>    PCOT (over Last 3 Days) - Ranging from 146-207     Skin: No Pressure Ulcers     Edema: None Noted     Last BM: on 12/25    Estimated Needs:   [X] No Change since Previous Assessment    Previous Nutrition Diagnosis:   Moderate Malnutrition    Nutrition Diagnosis is [X] Ongoing - Continues on Supplement     New Nutrition Diagnosis: [X] Not Applicable    Interventions:   1. Recommend Continue Nutrition Plan of Care   2. Educated on Need for Supplementation & PO Intake    Monitoring & Evaluation:   [X] Weights   [X] PO Intake   [X] Follow Up (Per Protocol)  [X] Tolerance to Diet Prescription   [X] Other: Labs & PCOT    RD Remains Available.  Aris Izaguirre RD

## 2018-12-27 PROCEDURE — 99232 SBSQ HOSP IP/OBS MODERATE 35: CPT

## 2018-12-27 PROCEDURE — 99233 SBSQ HOSP IP/OBS HIGH 50: CPT

## 2018-12-27 RX ORDER — INSULIN LISPRO 100/ML
VIAL (ML) SUBCUTANEOUS
Qty: 0 | Refills: 0 | Status: DISCONTINUED | OUTPATIENT
Start: 2018-12-27 | End: 2019-01-04

## 2018-12-27 RX ADMIN — LIDOCAINE 2 PATCH: 4 CREAM TOPICAL at 08:17

## 2018-12-27 RX ADMIN — APIXABAN 5 MILLIGRAM(S): 2.5 TABLET, FILM COATED ORAL at 05:41

## 2018-12-27 RX ADMIN — OXYCODONE HYDROCHLORIDE 5 MILLIGRAM(S): 5 TABLET ORAL at 05:41

## 2018-12-27 RX ADMIN — Medication 1 TABLET(S): at 11:48

## 2018-12-27 RX ADMIN — ZINC SULFATE TAB 220 MG (50 MG ZINC EQUIVALENT) 220 MILLIGRAM(S): 220 (50 ZN) TAB at 11:48

## 2018-12-27 RX ADMIN — Medication 1: at 08:16

## 2018-12-27 RX ADMIN — Medication 100 MILLIGRAM(S): at 17:54

## 2018-12-27 RX ADMIN — ATORVASTATIN CALCIUM 10 MILLIGRAM(S): 80 TABLET, FILM COATED ORAL at 20:22

## 2018-12-27 RX ADMIN — OXYCODONE HYDROCHLORIDE 10 MILLIGRAM(S): 5 TABLET ORAL at 20:22

## 2018-12-27 RX ADMIN — PANTOPRAZOLE SODIUM 40 MILLIGRAM(S): 20 TABLET, DELAYED RELEASE ORAL at 05:41

## 2018-12-27 RX ADMIN — OXYCODONE HYDROCHLORIDE 5 MILLIGRAM(S): 5 TABLET ORAL at 06:53

## 2018-12-27 RX ADMIN — LIDOCAINE 2 PATCH: 4 CREAM TOPICAL at 18:30

## 2018-12-27 RX ADMIN — BENZOCAINE AND MENTHOL 1 LOZENGE: 5; 1 LIQUID ORAL at 08:24

## 2018-12-27 RX ADMIN — Medication 500 MILLIGRAM(S): at 05:41

## 2018-12-27 RX ADMIN — OXYCODONE HYDROCHLORIDE 10 MILLIGRAM(S): 5 TABLET ORAL at 21:18

## 2018-12-27 RX ADMIN — LIDOCAINE 2 PATCH: 4 CREAM TOPICAL at 20:18

## 2018-12-27 RX ADMIN — Medication 500 MILLIGRAM(S): at 17:54

## 2018-12-27 RX ADMIN — OXYCODONE HYDROCHLORIDE 5 MILLIGRAM(S): 5 TABLET ORAL at 11:48

## 2018-12-27 RX ADMIN — Medication 0.25 MILLIGRAM(S): at 08:17

## 2018-12-27 RX ADMIN — OXYCODONE HYDROCHLORIDE 5 MILLIGRAM(S): 5 TABLET ORAL at 12:44

## 2018-12-27 RX ADMIN — SENNA PLUS 2 TABLET(S): 8.6 TABLET ORAL at 20:22

## 2018-12-27 RX ADMIN — APIXABAN 5 MILLIGRAM(S): 2.5 TABLET, FILM COATED ORAL at 17:54

## 2018-12-27 NOTE — PROGRESS NOTE ADULT - ASSESSMENT
85 yo f admitted 12/20/18 [facility] htn, t2dm, dyslipidemia presented s/p fall with acute left hip fracture s/p hemiarthroplasty    1. left hip fracture s/p hemiarthroplasty c/w pain management c/w pt and ot. f/u ortho  2. acute b/l dvt ultrasound done 12/15/18 c/w long term anticoagulation  3. pulmonary emboli: c/w long term anticoagulation   4. ckd stage 3: c/w monitoring creatinine baseline 1.5    5. t2dm hba1c 6.1 fs stable c/w current insulin regimen  6. dyslipidemia: c/w statin  7. anxiety: stable  8. dvt ppx: eliquis 87 yo f admitted 12/20/18 [facility] htn, t2dm, dyslipidemia presented s/p fall with acute left hip fracture s/p hemiarthroplasty    1. left hip fracture s/p hemiarthroplasty c/w pain management c/w pt and ot. f/u ortho  2. acute b/l dvt ultrasound done 12/15/18 c/w long term anticoagulation  3. pulmonary emboli: c/w long term anticoagulation   4. ckd stage 3: c/w monitoring creatinine baseline 1.5    5. t2dm hba1c 6.1 fs stable c/w current insulin regimen  6. dyslipidemia: c/w statin  7. anxiety: stable  8. dvt ppx: eliquis   9. mediastinal mass: outpatient f/u dr moseley

## 2018-12-27 NOTE — PROGRESS NOTE ADULT - SUBJECTIVE AND OBJECTIVE BOX
HISTORY OF PRESENT ILLNESS  Mrs. More is an 86 year old woman who fell on 12/13, noting left hip pain and presenting to [facility]. She was found to have a left hip fracture for which she underwent a hemiarthroplasty by Dr. Brenner. Post operatively, patient found to have bilat lower extremity DVTs and VQ scan Over the weekend, patient found to have bilat DVTs and VQ scan revealed intermediate probability of pulmonary embolus. Patient started on treatement dose Eliquis 10mg BID x7 days, then to decreased to 5mg BID there after.  Also s/p 1 unit PRBCs for anemia and Hen of 7.6. H/h improved and stable.   Patient was still requiring min to mod A for bed mobility and min A for transfers and ambulation with RW. Requiring max  assist for LE dressing.   Acute IPR program recommended. [facility]      TODAY'S SUBJECTIVE & REVIEW OF SYMPTOMS  Pt seen and examined.  Pt c/o pain last night which was relieved with analgesics; slept well after receiving oxycodone.    +BM yesterday.   Denies chest pain, SOB., dyspnea, cough or dysuria.      [X] Constitutional WNL        [X] Cardio WNL              [] Resp WNL  [X] GI WNL                            [X]  WNL                    [] Heme WNL  [X] Endo WNL                       [] Skin WNL                  [] MSK left hip pain   [X] Neuro WNL                     [X] Cognitive WNL         [X] Psych anxiety at times      Vital Signs Last 24 Hrs  T(C): 36.8 (27 Dec 2018 07:49), Max: 37.2 (26 Dec 2018 20:49)  T(F): 98.2 (27 Dec 2018 07:49), Max: 98.9 (26 Dec 2018 20:49)  HR: 89 (27 Dec 2018 07:49) (89 - 90)  BP: 113/65 (27 Dec 2018 07:49) (92/53 - 113/65)  BP(mean): --  RR: 14 (27 Dec 2018 07:49) (14 - 14)  SpO2: 93% (27 Dec 2018 07:49) (93% - 97%)    Physical Exam: Mental Status - Patient is alert, awake, oriented X3. Speech is fluent. Mood and affect  normal.  	No SOB during exam  	Motor Exam -  5/5 with exception of left hip and knee=3-/5  	Sensory    Intact to light touch bilaterally.                            	GENERAL Exam:  NAD 	  	HEENT:  normocephalic, atraumatic		  	LUNGS:	Clear bilaterally	  	HEART:	 Normal S1S2 	  	GI/ ABDOMEN:  Soft  Non tender +BS  	EXTREMITIES:   trace edema bilat LE  	MUSCULOSKELETAL: as above   SKIN:    Incision left hip with staples c/d/i  No erythema noted    FUNCTIONAL STATUS -   Bed mobility: Mod A  Transfers: CG/Min A  Gait - Amb 60' RW CG  Stairs-Negotiated 4 steps 2 HR mod A  ADL's - Eating mod I, grooming sup, UE dress min A, LE dress mod/max A  Functional Transfers - Max A    RECENT LABS/IMAGING                          8.6    8.6   )-----------( 341      ( 26 Dec 2018 07:17 )             26.0                           9.5    7.7   )-----------( 252      ( 21 Dec 2018 05:59 )             28.8     12-26    140  |  106  |  28<H>  ----------------------------<  132<H>  4.3   |  24  |  1.55<H>    Ca    8.8      26 Dec 2018 07:17        12-21    143  |  108  |  26<H>  ----------------------------<  152<H>  4.1   |  26  |  1.42<H>    Ca    9.0      21 Dec 2018 05:59    TPro  6.0  /  Alb  2.3<L>  /  TBili  0.5  /  DBili  x   /  AST  17  /  ALT  20  /  AlkPhos  46  12-21      CAPILLARY BLOOD GLUCOSE      POCT Blood Glucose.: 152 mg/dL (27 Dec 2018 07:49)        MEDICATIONS  (STANDING):  ALPRAZolam 0.25 milliGRAM(s) Oral <User Schedule>  amLODIPine   Tablet 5 milliGRAM(s) Oral daily  apixaban 5 milliGRAM(s) Oral every 12 hours  ascorbic acid 500 milliGRAM(s) Oral two times a day  atorvastatin 10 milliGRAM(s) Oral at bedtime  dextrose 5%. 1000 milliLiter(s) (50 mL/Hr) IV Continuous <Continuous>  dextrose 50% Injectable 12.5 Gram(s) IV Push once  dextrose 50% Injectable 25 Gram(s) IV Push once  dextrose 50% Injectable 25 Gram(s) IV Push once  docusate sodium 100 milliGRAM(s) Oral two times a day  insulin lispro (HumaLOG) corrective regimen sliding scale   SubCutaneous two times a day before meals  lidocaine   Patch 2 Patch Transdermal <User Schedule>  multivitamin 1 Tablet(s) Oral daily  oxyCODONE    IR 5 milliGRAM(s) Oral <User Schedule>  pantoprazole    Tablet 40 milliGRAM(s) Oral before breakfast  senna 2 Tablet(s) Oral at bedtime  zinc sulfate 220 milliGRAM(s) Oral daily    MEDICATIONS  (PRN):  acetaminophen   Tablet .. 650 milliGRAM(s) Oral every 6 hours PRN Mild Pain (1 - 3)  ALPRAZolam 0.25 milliGRAM(s) Oral daily PRN anxiety  benzocaine 15 mG/menthol 3.6 mG Lozenge 1 Lozenge Oral every 4 hours PRN Sore Throat  dextrose 40% Gel 15 Gram(s) Oral once PRN Blood Glucose LESS THAN 70 milliGRAM(s)/deciliter  glucagon  Injectable 1 milliGRAM(s) IntraMuscular once PRN Glucose LESS THAN 70 milligrams/deciliter  oxyCODONE    IR 5 milliGRAM(s) Oral every 4 hours PRN Moderate Pain (4 - 6)  oxyCODONE    IR 10 milliGRAM(s) Oral every 4 hours PRN Severe Pain (7 - 10)  polyethylene glycol 3350 17 Gram(s) Oral daily PRN Constipation      ASSESSMENT/PLAN

## 2018-12-27 NOTE — PROGRESS NOTE ADULT - SUBJECTIVE AND OBJECTIVE BOX
Patient is a 86y old  Female who presents with a chief complaint of fall resulting in left hip fx requiring hemiarthroplasty, functional deficits (26 Dec 2018 15:07)      Patient seen and examined at bedside.    ALLERGIES:  penicillin (Rash; Urticaria)    MEDICATIONS:  ALPRAZolam 0.25 milliGRAM(s) Oral daily PRN  ascorbic acid 500 milliGRAM(s) Oral two times a day  benzocaine 15 mG/menthol 3.6 mG Lozenge 1 Lozenge Oral every 4 hours PRN  docusate sodium 100 milliGRAM(s) Oral two times a day  insulin lispro (HumaLOG) corrective regimen sliding scale   SubCutaneous two times a day before meals  multivitamin 1 Tablet(s) Oral daily  oxyCODONE    IR 5 milliGRAM(s) Oral <User Schedule>  zinc sulfate 220 milliGRAM(s) Oral daily    Vital Signs Last 24 Hrs  T(F): 98.2 (27 Dec 2018 07:49), Max: 98.9 (26 Dec 2018 20:49)  HR: 89 (27 Dec 2018 07:49) (89 - 90)  BP: 113/65 (27 Dec 2018 07:49) (92/53 - 113/65)  RR: 14 (27 Dec 2018 07:49) (14 - 14)  SpO2: 93% (27 Dec 2018 07:49) (93% - 97%)  I&O's Summary      PHYSICAL EXAM:  General: NAD, A/O x 3  ENT: MMM  Neck: Supple, No JVD  Lungs: Clear to auscultation bilaterally  Cardio: RRR, S1/S2, No murmurs  Abdomen: Soft, Nontender, Nondistended; Bowel sounds present  Extremities: No cyanosis, No edema    LABS:                        8.6    8.6   )-----------( 341      ( 26 Dec 2018 07:17 )             26.0     12-26    140  |  106  |  28  ----------------------------<  132  4.3   |  24  |  1.55    Ca    8.8      26 Dec 2018 07:17      eGFR if Non African American: 30 mL/min/1.73M2 (12-26-18 @ 07:17)  eGFR if African American: 35 mL/min/1.73M2 (12-26-18 @ 07:17)                    CAPILLARY BLOOD GLUCOSE      POCT Blood Glucose.: 152 mg/dL (27 Dec 2018 07:49)    12-15 [id number] 6.1  12-14 [id number] 6.0          RADIOLOGY & ADDITIONAL TESTS:    Care Discussed with Consultants/Other Providers:

## 2018-12-27 NOTE — PROGRESS NOTE ADULT - PROBLEM SELECTOR PLAN 1
s/p hemiarthroplasty with functional deficits  Continue PT/OT   MVI, zinc and vit c for healing  Add Scheduled Oxycodone prior to therapy (0600 and 1200)   Oxycodone [id number] prn, tylenol prn, ice packs prn, and lidocaine patches for pain management

## 2018-12-28 DIAGNOSIS — D50.0 IRON DEFICIENCY ANEMIA SECONDARY TO BLOOD LOSS (CHRONIC): ICD-10-CM

## 2018-12-28 LAB
ANION GAP SERPL CALC-SCNC: 10 MMOL/L — SIGNIFICANT CHANGE UP (ref 5–17)
BUN SERPL-MCNC: 34 MG/DL — HIGH (ref 7–23)
CALCIUM SERPL-MCNC: 9.1 MG/DL — SIGNIFICANT CHANGE UP (ref 8.4–10.5)
CHLORIDE SERPL-SCNC: 107 MMOL/L — SIGNIFICANT CHANGE UP (ref 96–108)
CO2 SERPL-SCNC: 23 MMOL/L — SIGNIFICANT CHANGE UP (ref 22–31)
CREAT SERPL-MCNC: 1.62 MG/DL — HIGH (ref 0.5–1.3)
GLUCOSE SERPL-MCNC: 126 MG/DL — HIGH (ref 70–99)
HCT VFR BLD CALC: 24.8 % — LOW (ref 34.5–45)
HGB BLD-MCNC: 8.2 G/DL — LOW (ref 11.5–15.5)
MCHC RBC-ENTMCNC: 30.9 PG — SIGNIFICANT CHANGE UP (ref 27–34)
MCHC RBC-ENTMCNC: 33.3 GM/DL — SIGNIFICANT CHANGE UP (ref 32–36)
MCV RBC AUTO: 92.8 FL — SIGNIFICANT CHANGE UP (ref 80–100)
PLATELET # BLD AUTO: 322 K/UL — SIGNIFICANT CHANGE UP (ref 150–400)
POTASSIUM SERPL-MCNC: 4.3 MMOL/L — SIGNIFICANT CHANGE UP (ref 3.5–5.3)
POTASSIUM SERPL-SCNC: 4.3 MMOL/L — SIGNIFICANT CHANGE UP (ref 3.5–5.3)
RBC # BLD: 2.67 M/UL — LOW (ref 3.8–5.2)
RBC # FLD: 12.5 % — SIGNIFICANT CHANGE UP (ref 10.3–14.5)
SODIUM SERPL-SCNC: 140 MMOL/L — SIGNIFICANT CHANGE UP (ref 135–145)
WBC # BLD: 6.6 K/UL — SIGNIFICANT CHANGE UP (ref 3.8–10.5)
WBC # FLD AUTO: 6.6 K/UL — SIGNIFICANT CHANGE UP (ref 3.8–10.5)

## 2018-12-28 PROCEDURE — 99232 SBSQ HOSP IP/OBS MODERATE 35: CPT

## 2018-12-28 PROCEDURE — 99233 SBSQ HOSP IP/OBS HIGH 50: CPT

## 2018-12-28 RX ORDER — AMLODIPINE BESYLATE 2.5 MG/1
2.5 TABLET ORAL DAILY
Qty: 0 | Refills: 0 | Status: DISCONTINUED | OUTPATIENT
Start: 2018-12-29 | End: 2019-01-04

## 2018-12-28 RX ORDER — FERROUS SULFATE 325(65) MG
325 TABLET ORAL DAILY
Qty: 0 | Refills: 0 | Status: DISCONTINUED | OUTPATIENT
Start: 2018-12-28 | End: 2019-01-04

## 2018-12-28 RX ADMIN — Medication 0.25 MILLIGRAM(S): at 07:41

## 2018-12-28 RX ADMIN — Medication 650 MILLIGRAM(S): at 17:53

## 2018-12-28 RX ADMIN — ZINC SULFATE TAB 220 MG (50 MG ZINC EQUIVALENT) 220 MILLIGRAM(S): 220 (50 ZN) TAB at 11:13

## 2018-12-28 RX ADMIN — OXYCODONE HYDROCHLORIDE 5 MILLIGRAM(S): 5 TABLET ORAL at 13:46

## 2018-12-28 RX ADMIN — Medication 650 MILLIGRAM(S): at 19:02

## 2018-12-28 RX ADMIN — Medication 1 TABLET(S): at 11:12

## 2018-12-28 RX ADMIN — Medication 100 MILLIGRAM(S): at 05:32

## 2018-12-28 RX ADMIN — LIDOCAINE 2 PATCH: 4 CREAM TOPICAL at 21:00

## 2018-12-28 RX ADMIN — LIDOCAINE 2 PATCH: 4 CREAM TOPICAL at 17:55

## 2018-12-28 RX ADMIN — Medication 100 MILLIGRAM(S): at 17:53

## 2018-12-28 RX ADMIN — APIXABAN 5 MILLIGRAM(S): 2.5 TABLET, FILM COATED ORAL at 05:33

## 2018-12-28 RX ADMIN — APIXABAN 5 MILLIGRAM(S): 2.5 TABLET, FILM COATED ORAL at 17:52

## 2018-12-28 RX ADMIN — OXYCODONE HYDROCHLORIDE 5 MILLIGRAM(S): 5 TABLET ORAL at 12:23

## 2018-12-28 RX ADMIN — ATORVASTATIN CALCIUM 10 MILLIGRAM(S): 80 TABLET, FILM COATED ORAL at 21:12

## 2018-12-28 RX ADMIN — LIDOCAINE 2 PATCH: 4 CREAM TOPICAL at 07:42

## 2018-12-28 RX ADMIN — Medication 500 MILLIGRAM(S): at 17:53

## 2018-12-28 RX ADMIN — Medication 500 MILLIGRAM(S): at 05:33

## 2018-12-28 RX ADMIN — OXYCODONE HYDROCHLORIDE 5 MILLIGRAM(S): 5 TABLET ORAL at 06:33

## 2018-12-28 RX ADMIN — Medication 325 MILLIGRAM(S): at 21:12

## 2018-12-28 RX ADMIN — SENNA PLUS 2 TABLET(S): 8.6 TABLET ORAL at 21:12

## 2018-12-28 RX ADMIN — OXYCODONE HYDROCHLORIDE 5 MILLIGRAM(S): 5 TABLET ORAL at 05:33

## 2018-12-28 RX ADMIN — PANTOPRAZOLE SODIUM 40 MILLIGRAM(S): 20 TABLET, DELAYED RELEASE ORAL at 05:33

## 2018-12-28 RX ADMIN — Medication 1: at 07:43

## 2018-12-28 NOTE — PROGRESS NOTE ADULT - SUBJECTIVE AND OBJECTIVE BOX
Patient is a 86 year old  Female who presents with a chief complaint of fall resulting in left hip fracture requiring hemiarthroplasty, functional deficits (27 Dec 2018 15:09)      Patient reports dull intermittent mild left hip pain, comes and goes, worse with movement and better with pain medication. Patient denies any other complaints at time of assessment.    MEDICATIONS  (STANDING):  amLODIPine   Tablet 5 milliGRAM(s) Oral daily  apixaban 5 milliGRAM(s) Oral every 12 hours  ascorbic acid 500 milliGRAM(s) Oral two times a day  atorvastatin 10 milliGRAM(s) Oral at bedtime  dextrose 5%. 1000 milliLiter(s) (50 mL/Hr) IV Continuous <Continuous>  dextrose 50% Injectable 12.5 Gram(s) IV Push once  dextrose 50% Injectable 25 Gram(s) IV Push once  dextrose 50% Injectable 25 Gram(s) IV Push once  docusate sodium 100 milliGRAM(s) Oral two times a day  insulin lispro (HumaLOG) corrective regimen sliding scale   SubCutaneous before breakfast  lidocaine   Patch 2 Patch Transdermal <User Schedule>  multivitamin 1 Tablet(s) Oral daily  oxyCODONE    IR 5 milliGRAM(s) Oral <User Schedule>  pantoprazole    Tablet 40 milliGRAM(s) Oral before breakfast  senna 2 Tablet(s) Oral at bedtime  zinc sulfate 220 milliGRAM(s) Oral daily    MEDICATIONS  (PRN):  acetaminophen   Tablet .. 650 milliGRAM(s) Oral every 6 hours PRN Mild Pain (1 - 3)  ALPRAZolam 0.25 milliGRAM(s) Oral daily PRN anxiety  benzocaine 15 mG/menthol 3.6 mG Lozenge 1 Lozenge Oral every 4 hours PRN Sore Throat  dextrose 40% Gel 15 Gram(s) Oral once PRN Blood Glucose LESS THAN 70 milliGRAM(s)/deciliter  glucagon  Injectable 1 milliGRAM(s) IntraMuscular once PRN Glucose LESS THAN 70 milligrams/deciliter  polyethylene glycol 3350 17 Gram(s) Oral daily PRN Constipation      REVIEW OF SYSTEMS:  CONSTITUTIONAL: No fever, weight loss; has mild fatigue  EYES: No eye pain, visual disturbances, or discharge  ENMT:  No difficulty hearing, tinnitus, vertigo; No sinus or throat pain  NECK: No pain or stiffness  RESPIRATORY: No cough, wheezing, chills or hemoptysis; No shortness of breath  CARDIOVASCULAR: No chest pain, palpitations, dizziness, or leg swelling  GASTROINTESTINAL: No abdominal or epigastric pain. No nausea, vomiting, or hematemesis; No diarrhea or constipation. No melena or hematochezia.  GENITOURINARY: No dysuria, frequency, hematuria, or incontinence  NEUROLOGICAL: No headaches, memory loss, loss of strength, numbness, or tremors  SKIN: No itching, burning, rashes, or lesions   ENDOCRINE: No heat or cold intolerance; No hair loss  MUSCULOSKELETAL: intermittent left hip pain, No other joint pain or swelling; No muscle, back, or extremity pain  PSYCHIATRIC: No depression, anxiety, mood swings, or difficulty sleeping  HEME/LYMPH: No easy bruising, or bleeding gums  ALLERGY AND IMMUNOLOGIC: No hives or eczema    PHYSICAL EXAM:  T(C): 36.3 (12-27-18 @ 20:11), Max: 36.3 (12-27-18 @ 20:11)  HR: 82 (12-28-18 @ 05:30) (81 - 82)  BP: 101/60 (12-28-18 @ 05:30) (97/59 - 101/60)  RR: 14 (12-27-18 @ 20:11) (14 - 14)  SpO2: 95% (12-27-18 @ 20:11) (95% - 95%)      GENERAL: NAD, well-groomed, well-developed  HEAD:  Atraumatic, Normocephalic  EYES: EOMI, PERRL, conjunctiva and sclera clear  ENMT: Moist mucous membranes  NECK: Supple, No JVD, Normal thyroid  NERVOUS SYSTEM:  Alert & Oriented X3, no focal deficit  CHEST/LUNG: Clear to ascultation bilaterally; No rales, rhonchi, wheezing, or rubs  HEART: Regular rate and rhythm; No murmurs, rubs, or gallops  ABDOMEN: Soft, Nontender, Nondistended; Bowel sounds present  EXTREMITIES:  2+ Peripheral Pulses, No clubbing, cyanosis, or edema  SKIN: left hip incision healing well, No rash        LABS:                        8.2    6.6   )-----------( 322      ( 28 Dec 2018 05:20 )             24.8     12-28    140  |  107  |  34<H>  ----------------------------<  126<H>  4.3   |  23  |  1.62<H>    Ca    9.1      28 Dec 2018 05:20          CAPILLARY BLOOD GLUCOSE  POCT Blood Glucose.: 156 mg/dL (28 Dec 2018 07:35)            RADIOLOGY & ADDITIONAL TESTS:    Imaging Personally Reviewed:  [x] YES  [ ] NO    Consultant(s) Notes Reviewed:  [x] YES  [ ] NO    Care Discussed with Consultants/Other Providers [x] YES  [ ] NO

## 2018-12-28 NOTE — PROGRESS NOTE ADULT - PROBLEM SELECTOR PLAN 5
Norvasc-held last 2 days due to parameters.  Reduce dose from 5mg to 2.5mg daily beginning 12/29 and monitor

## 2018-12-28 NOTE — PROGRESS NOTE ADULT - SUBJECTIVE AND OBJECTIVE BOX
HISTORY OF PRESENT ILLNESS  Mrs. More is an 86 year old woman who fell on 12/13, noting left hip pain and presenting to [facility]. She was found to have a left hip fracture for which she underwent a hemiarthroplasty by Dr. Brenner. Post operatively, patient found to have bilat lower extremity DVTs and VQ scan Over the weekend, patient found to have bilat DVTs and VQ scan revealed intermediate probability of pulmonary embolus. Patient started on treatement dose Eliquis 10mg BID x7 days, then to decreased to 5mg BID there after.  Also s/p 1 unit PRBCs for anemia and Hen of 7.6. H/h improved and stable.   Patient was still requiring min to mod A for bed mobility and min A for transfers and ambulation with RW. Requiring max  assist for LE dressing.   Acute IPR program recommended. Patient cleared medically for admission to acute IPR at [facility] on 12/20/18      TODAY'S SUBJECTIVE & REVIEW OF SYMPTOMS  Pt seen and examined.  Pt c/o intermittent pain left hip which is relieved with analgesics.    Slept well  +BM 12/26.   Denies chest pain, SOB., dyspnea, cough or dysuria.      [X] Constitutional WNL        [X] Cardio WNL              [] Resp WNL  [X] GI WNL                            [X]  WNL                    [] Heme WNL  [X] Endo WNL                       [] Skin WNL                  [] MSK left hip pain   [X] Neuro WNL                     [X] Cognitive WNL         [X] Psych anxiety at times      Vital Signs Last 24 Hrs  T(C): 36.7 (28 Dec 2018 10:57), Max: 36.7 (28 Dec 2018 10:57)  T(F): 98 (28 Dec 2018 10:57), Max: 98 (28 Dec 2018 10:57)  HR: 81 (28 Dec 2018 10:57) (81 - 82)  BP: 98/61 (28 Dec 2018 10:57) (97/59 - 101/60)  BP(mean): --  RR: 15 (28 Dec 2018 10:57) (14 - 15)  SpO2: 96% (28 Dec 2018 10:57) (95% - 96%)    Physical Exam: Mental Status - Patient is alert, awake, oriented X3. Speech is fluent. Mood and affect  normal.  	No SOB during exam  	Motor Exam -  5/5 with exception of left hip and knee=3-/5  	Sensory    Intact to light touch bilaterally.                            	GENERAL Exam:  NAD 	  	HEENT:  normocephalic, atraumatic		  	LUNGS:	Clear bilaterally	  	HEART:	 Normal S1S2 	  	GI/ ABDOMEN:  Soft  Non tender +BS  	EXTREMITIES:   trace edema bilat LE  	MUSCULOSKELETAL: as above   SKIN:    Incision left hip with staples removed and steri strips applied c/d/i  No erythema noted    FUNCTIONAL STATUS -   Bed mobility: Min A  Transfers: CG/Min A  Gait - Amb 60' RW CG  Stairs-Negotiated 4 steps 2 HR mod A  ADL's - Eating mod I, grooming sup, UE dress min A, LE dress mod/max A  Functional Transfers - Max A    RECENT LABS/IMAGING                                     8.2    6.6   )-----------( 322      ( 28 Dec 2018 05:20 )             24.8       12-28    140  |  107  |  34<H>  ----------------------------<  126<H>  4.3   |  23  |  1.62<H>    Ca    9.1      28 Dec 2018 05:20      CAPILLARY BLOOD GLUCOSE      POCT Blood Glucose.: 156 mg/dL (28 Dec 2018 07:35)        MEDICATIONS  (STANDING):  ALPRAZolam 0.25 milliGRAM(s) Oral <User Schedule>  amLODIPine   Tablet 5 milliGRAM(s) Oral daily  apixaban 5 milliGRAM(s) Oral every 12 hours  ascorbic acid 500 milliGRAM(s) Oral two times a day  atorvastatin 10 milliGRAM(s) Oral at bedtime  dextrose 5%. 1000 milliLiter(s) (50 mL/Hr) IV Continuous <Continuous>  dextrose 50% Injectable 12.5 Gram(s) IV Push once  dextrose 50% Injectable 25 Gram(s) IV Push once  dextrose 50% Injectable 25 Gram(s) IV Push once  docusate sodium 100 milliGRAM(s) Oral two times a day  insulin lispro (HumaLOG) corrective regimen sliding scale   SubCutaneous two times a day before meals  lidocaine   Patch 2 Patch Transdermal <User Schedule>  multivitamin 1 Tablet(s) Oral daily  oxyCODONE    IR 5 milliGRAM(s) Oral <User Schedule>  pantoprazole    Tablet 40 milliGRAM(s) Oral before breakfast  senna 2 Tablet(s) Oral at bedtime  zinc sulfate 220 milliGRAM(s) Oral daily    MEDICATIONS  (PRN):  acetaminophen   Tablet .. 650 milliGRAM(s) Oral every 6 hours PRN Mild Pain (1 - 3)  ALPRAZolam 0.25 milliGRAM(s) Oral daily PRN anxiety  benzocaine 15 mG/menthol 3.6 mG Lozenge 1 Lozenge Oral every 4 hours PRN Sore Throat  dextrose 40% Gel 15 Gram(s) Oral once PRN Blood Glucose LESS THAN 70 milliGRAM(s)/deciliter  glucagon  Injectable 1 milliGRAM(s) IntraMuscular once PRN Glucose LESS THAN 70 milligrams/deciliter  oxyCODONE    IR 5 milliGRAM(s) Oral every 4 hours PRN Moderate Pain (4 - 6)  oxyCODONE    IR 10 milliGRAM(s) Oral every 4 hours PRN Severe Pain (7 - 10)  polyethylene glycol 3350 17 Gram(s) Oral daily PRN Constipation      ASSESSMENT/PLAN

## 2018-12-28 NOTE — PROGRESS NOTE ADULT - ASSESSMENT
86 year old female from home PMH HTN, DMII, HLD, presented with fall admitted for acute left hip fracture.    >Acute left hip fracture: underwent a hemiarthroplasty by Dr. Brenner, here for comprehensive PT and OT program   pain control, MVI, zinc and vit c for healing, f/u orthopedics as outpatient    >Left hip pain secondary to above: pain control, Tylenol prn, Oxycodone [id number] prn, ice packs prn, and lidocaine patches for pain management.     >DVT (deep venous thrombosis): Continue Eliquis 5mg q12h  Vascular consult recommends to continue Eliquis    >Pulmonary emboli: Continue eliquis, Oxygen prn.     >CKD III: baseline Cr is 1.5, continue to encourage hydration, hold ARB    >Anterior mediastinal rim enhancing mass: appears stable from 2016, consulted Pulsrav[facility] Cordoba, needs outpt f/u at discharge, not causing any acute issues currently     >DMII: continue SSI, check HcA1C - 6.1%, monitor accuchecks, currently at goal, ISS, diabetic diet    >HLD: continue lipitor.    >HTN: BP controlled, continue Norvasc, hold ARB     >Anxiety: stable, Continue xanax once daily, f/u psychiatry.    >DVT PPX: patient is on Eliquis

## 2018-12-28 NOTE — PROGRESS NOTE ADULT - PROBLEM SELECTOR PLAN 1
s/p hemiarthroplasty with functional deficits  Continue PT/OT   MVI, zinc and vit c for healing  Add Scheduled Oxycodone prior to therapy (0600 and 1200)   Oxycodone [id number] prn, tylenol prn, ice packs prn, and lidocaine patches for pain management

## 2018-12-29 PROCEDURE — 99233 SBSQ HOSP IP/OBS HIGH 50: CPT

## 2018-12-29 PROCEDURE — 99232 SBSQ HOSP IP/OBS MODERATE 35: CPT

## 2018-12-29 RX ORDER — ALPRAZOLAM 0.25 MG
0.25 TABLET ORAL
Qty: 0 | Refills: 0 | Status: DISCONTINUED | OUTPATIENT
Start: 2018-12-29 | End: 2019-01-04

## 2018-12-29 RX ORDER — ALPRAZOLAM 0.25 MG
0.25 TABLET ORAL DAILY
Qty: 0 | Refills: 0 | Status: DISCONTINUED | OUTPATIENT
Start: 2018-12-29 | End: 2019-01-04

## 2018-12-29 RX ADMIN — Medication 100 MILLIGRAM(S): at 06:35

## 2018-12-29 RX ADMIN — ATORVASTATIN CALCIUM 10 MILLIGRAM(S): 80 TABLET, FILM COATED ORAL at 21:22

## 2018-12-29 RX ADMIN — LIDOCAINE 2 PATCH: 4 CREAM TOPICAL at 06:35

## 2018-12-29 RX ADMIN — PANTOPRAZOLE SODIUM 40 MILLIGRAM(S): 20 TABLET, DELAYED RELEASE ORAL at 06:35

## 2018-12-29 RX ADMIN — LIDOCAINE 2 PATCH: 4 CREAM TOPICAL at 21:20

## 2018-12-29 RX ADMIN — APIXABAN 5 MILLIGRAM(S): 2.5 TABLET, FILM COATED ORAL at 06:35

## 2018-12-29 RX ADMIN — Medication 1 TABLET(S): at 12:07

## 2018-12-29 RX ADMIN — ZINC SULFATE TAB 220 MG (50 MG ZINC EQUIVALENT) 220 MILLIGRAM(S): 220 (50 ZN) TAB at 12:07

## 2018-12-29 RX ADMIN — OXYCODONE HYDROCHLORIDE 5 MILLIGRAM(S): 5 TABLET ORAL at 06:35

## 2018-12-29 RX ADMIN — Medication 650 MILLIGRAM(S): at 08:30

## 2018-12-29 RX ADMIN — Medication 650 MILLIGRAM(S): at 17:14

## 2018-12-29 RX ADMIN — Medication 325 MILLIGRAM(S): at 12:07

## 2018-12-29 RX ADMIN — LIDOCAINE 2 PATCH: 4 CREAM TOPICAL at 06:36

## 2018-12-29 RX ADMIN — SENNA PLUS 2 TABLET(S): 8.6 TABLET ORAL at 21:22

## 2018-12-29 RX ADMIN — OXYCODONE HYDROCHLORIDE 5 MILLIGRAM(S): 5 TABLET ORAL at 07:05

## 2018-12-29 RX ADMIN — Medication 500 MILLIGRAM(S): at 17:14

## 2018-12-29 RX ADMIN — Medication 650 MILLIGRAM(S): at 07:51

## 2018-12-29 RX ADMIN — BENZOCAINE AND MENTHOL 1 LOZENGE: 5; 1 LIQUID ORAL at 17:17

## 2018-12-29 RX ADMIN — AMLODIPINE BESYLATE 2.5 MILLIGRAM(S): 2.5 TABLET ORAL at 06:35

## 2018-12-29 RX ADMIN — APIXABAN 5 MILLIGRAM(S): 2.5 TABLET, FILM COATED ORAL at 17:13

## 2018-12-29 RX ADMIN — Medication 500 MILLIGRAM(S): at 06:35

## 2018-12-29 RX ADMIN — Medication 650 MILLIGRAM(S): at 17:44

## 2018-12-29 RX ADMIN — Medication 0.25 MILLIGRAM(S): at 08:42

## 2018-12-29 NOTE — PROGRESS NOTE ADULT - SUBJECTIVE AND OBJECTIVE BOX
Patient is a 86 year old  Female who presents with a chief complaint of fall resulting in left hip fracture requiring hemiarthroplasty, functional deficits (29 Dec 2018 08:46)    Patient reports dull intermittent mild to moderate left hip pain, improving, comes and goes, worse with movement and better with pain medication. Patient denies any other complaints at time of assessment.      MEDICATIONS  (STANDING):  ALPRAZolam 0.25 milliGRAM(s) Oral <User Schedule>  amLODIPine   Tablet 2.5 milliGRAM(s) Oral daily  apixaban 5 milliGRAM(s) Oral every 12 hours  ascorbic acid 500 milliGRAM(s) Oral two times a day  atorvastatin 10 milliGRAM(s) Oral at bedtime  dextrose 5%. 1000 milliLiter(s) (50 mL/Hr) IV Continuous <Continuous>  dextrose 50% Injectable 12.5 Gram(s) IV Push once  dextrose 50% Injectable 25 Gram(s) IV Push once  dextrose 50% Injectable 25 Gram(s) IV Push once  docusate sodium 100 milliGRAM(s) Oral two times a day  ferrous    sulfate 325 milliGRAM(s) Oral daily  insulin lispro (HumaLOG) corrective regimen sliding scale   SubCutaneous before breakfast  lidocaine   Patch 2 Patch Transdermal <User Schedule>  multivitamin 1 Tablet(s) Oral daily  oxyCODONE    IR 5 milliGRAM(s) Oral <User Schedule>  pantoprazole    Tablet 40 milliGRAM(s) Oral before breakfast  senna 2 Tablet(s) Oral at bedtime  zinc sulfate 220 milliGRAM(s) Oral daily    MEDICATIONS  (PRN):  acetaminophen   Tablet .. 650 milliGRAM(s) Oral every 6 hours PRN Mild Pain (1 - 3)  ALPRAZolam 0.25 milliGRAM(s) Oral daily PRN anxiety  benzocaine 15 mG/menthol 3.6 mG Lozenge 1 Lozenge Oral every 4 hours PRN Sore Throat  dextrose 40% Gel 15 Gram(s) Oral once PRN Blood Glucose LESS THAN 70 milliGRAM(s)/deciliter  glucagon  Injectable 1 milliGRAM(s) IntraMuscular once PRN Glucose LESS THAN 70 milligrams/deciliter  polyethylene glycol 3350 17 Gram(s) Oral daily PRN Constipation    REVIEW OF SYSTEMS:  CONSTITUTIONAL: No fever, weight loss; has mild fatigue  EYES: No eye pain, visual disturbances, or discharge  ENMT:  No difficulty hearing, tinnitus, vertigo; No sinus or throat pain  NECK: No pain or stiffness  RESPIRATORY: No cough, wheezing, chills or hemoptysis; No shortness of breath  CARDIOVASCULAR: No chest pain, palpitations, dizziness, or leg swelling  GASTROINTESTINAL: No abdominal or epigastric pain. No nausea, vomiting, or hematemesis; No diarrhea or constipation. No melena or hematochezia.  GENITOURINARY: No dysuria, frequency, hematuria, or incontinence  NEUROLOGICAL: No headaches, memory loss, loss of strength, numbness, or tremors  SKIN: No itching, burning, rashes, or lesions   ENDOCRINE: No heat or cold intolerance; No hair loss  MUSCULOSKELETAL: intermittent left hip pain, No other joint pain or swelling; No muscle, back, or extremity pain  PSYCHIATRIC: No depression, anxiety, mood swings, or difficulty sleeping  HEME/LYMPH: No easy bruising, or bleeding gums  ALLERGY AND IMMUNOLOGIC: No hives or eczema      PHYSICAL EXAM:  T(C): 36.7 (12-29-18 @ 06:36), Max: 36.8 (12-28-18 @ 21:06)  HR: 80 (12-29-18 @ 06:36) (80 - 85)  BP: 110/62 (12-29-18 @ 06:36) (98/61 - 118/62)  RR: 14 (12-29-18 @ 06:36) (14 - 15)  SpO2: 96% (12-29-18 @ 06:36) (95% - 96%)    GENERAL: NAD, well-groomed, well-developed  HEAD:  Atraumatic, Normocephalic  EYES: EOMI, PERRL, conjunctiva and sclera clear  ENMT: Moist mucous membranes  NECK: Supple, No JVD, Normal thyroid  NERVOUS SYSTEM:  Alert & Oriented X3, no focal deficit  CHEST/LUNG: Clear to ascultation bilaterally; No rales, rhonchi, wheezing, or rubs  HEART: Regular rate and rhythm; No murmurs, rubs, or gallops  ABDOMEN: Soft, Nontender, Nondistended; Bowel sounds present  EXTREMITIES:  2+ Peripheral Pulses, No clubbing, cyanosis, or edema  SKIN: left hip incision healing well, steri strips in place,  No rash      LABS:                        8.2    6.6   )-----------( 322      ( 28 Dec 2018 05:20 )             24.8     12-28    140  |  107  |  34<H>  ----------------------------<  126<H>  4.3   |  23  |  1.62<H>    Ca    9.1      28 Dec 2018 05:20          CAPILLARY BLOOD GLUCOSE  POCT Blood Glucose.: 144 mg/dL (29 Dec 2018 07:46)        RADIOLOGY & ADDITIONAL TESTS:    Imaging Personally Reviewed:  [x] YES  [ ] NO    Consultant(s) Notes Reviewed:  [x] YES  [ ] NO    Care Discussed with Consultants/Other Providers [x] YES  [ ] NO

## 2018-12-29 NOTE — PROGRESS NOTE ADULT - ASSESSMENT
86 year old female from home PMH HTN, DMII, HLD, presented with fall admitted for acute left hip fracture.    >Acute left hip fracture: underwent a hemiarthroplasty by Dr. Brenner, here for comprehensive PT and OT program   pain control, MVI, zinc and vit c for healing, f/u orthopedics as outpatient    >Left hip pain secondary to above: pain control, Tylenol prn, Oxycodone [id number] prn, ice packs prn, and lidocaine patches for pain management.     >DVT (deep venous thrombosis): Continue Eliquis 5mg q12h  Vascular consult recommends to continue Eliquis    >Pulmonary emboli: Continue eliquis, Oxygen prn.     >CKD III: baseline Cr is 1.5, continue to encourage hydration, hold ARB    >Anterior mediastinal rim enhancing mass: appears stable from 2016, consulted Pulsrav[facility] Cordoba, needs outpt f/u at discharge, not causing any acute issues currently     >DMII: continue SSI, check HcA1C - 6.1%, monitor accuchecks, currently at goal, ISS, diabetic diet    >HLD: continue lipitor.    >HTN: BP controlled, continue Norvasc, hold ARB     >Anxiety: stable, Continue xanax once daily, f/u psychiatry.    >DVT PPX: patient is on Eliquis

## 2018-12-29 NOTE — PROGRESS NOTE ADULT - SUBJECTIVE AND OBJECTIVE BOX
No overnight events.  Patient had Xanax . Feels a little anxious.   Reordered medication.   Pain is controlled.   Slept all night.   Does feel fatigued.     REVIEW OF SYSTEMS  Constitutional - No fever,  +fatigue  Neurological - No headaches, +loss of strength  Musculoskeletal - +joint pain, +joint swelling, +muscle pain    VITALS  T(C): 36.7 (18 @ 06:36), Max: 36.8 (18 @ 21:06)  HR: 80 (18 @ 06:36) (80 - 85)  BP: 110/62 (18 @ 06:36) (98/61 - 118/62)  RR: 14 (18 @ 06:36) (14 - 15)  SpO2: 96% (18 @ 06:36) (95% - 96%)  Wt(kg): --       MEDICATIONS   acetaminophen   Tablet .. 650 milliGRAM(s) every 6 hours PRN  ALPRAZolam 0.25 milliGRAM(s) <User Schedule>  ALPRAZolam 0.25 milliGRAM(s) daily PRN  amLODIPine   Tablet 2.5 milliGRAM(s) daily  apixaban 5 milliGRAM(s) every 12 hours  ascorbic acid 500 milliGRAM(s) two times a day  atorvastatin 10 milliGRAM(s) at bedtime  benzocaine 15 mG/menthol 3.6 mG Lozenge 1 Lozenge every 4 hours PRN  dextrose 40% Gel 15 Gram(s) once PRN  dextrose 5%. 1000 milliLiter(s) <Continuous>  dextrose 50% Injectable 12.5 Gram(s) once  dextrose 50% Injectable 25 Gram(s) once  dextrose 50% Injectable 25 Gram(s) once  docusate sodium 100 milliGRAM(s) two times a day  ferrous    sulfate 325 milliGRAM(s) daily  glucagon  Injectable 1 milliGRAM(s) once PRN  insulin lispro (HumaLOG) corrective regimen sliding scale   before breakfast  lidocaine   Patch 2 Patch <User Schedule>  multivitamin 1 Tablet(s) daily  oxyCODONE    IR 5 milliGRAM(s) <User Schedule>  pantoprazole    Tablet 40 milliGRAM(s) before breakfast  polyethylene glycol 3350 17 Gram(s) daily PRN  senna 2 Tablet(s) at bedtime  zinc sulfate 220 milliGRAM(s) daily      RECENT LABS/IMAGING  CBC Full  -  ( 28 Dec 2018 05:20 )  WBC Count : 6.6 K/uL  Hemoglobin : 8.2 g/dL  Hematocrit : 24.8 %  Platelet Count - Automated : 322 K/uL  Mean Cell Volume : 92.8 fl  Mean Cell Hemoglobin : 30.9 pg  Mean Cell Hemoglobin Concentration : 33.3 gm/dL  Auto Neutrophil # : x  Auto Lymphocyte # : x  Auto Monocyte # : x  Auto Eosinophil # : x  Auto Basophil # : x  Auto Neutrophil % : x  Auto Lymphocyte % : x  Auto Monocyte % : x  Auto Eosinophil % : x  Auto Basophil % : x        140  |  107  |  34<H>  ----------------------------<  126<H>  4.3   |  23  |  1.62<H>    Ca    9.1      28 Dec 2018 05:20          POCT Blood Glucose.: 144 mg/dL (18 @ 07:46)    ---------  PHYSICAL EXAM  Constitutional - NAD, Comfortable  Pulm - Breathing comfortably, No wheezing  Abd - Soft, NTND  Extremities - Mild BLE swelling, No calf tenderness  Neurologic Exam -                    Cognitive - Awake, Alert     Communication - Fluent     Motor -  left > right LE weakness related to pain     Sensory - Intact to LT  Psychiatric - Mood WNL, Affect WNL    ASSESSMENT/PLAN  86y Female with functional deficits after keft ELENITA for femoral fracture  Mood - Xanax  HTN - Norvasc  DM2 - ISS  CAD - Lipitor  GI PPX - Colace, Protonix, Miralax, Senna	  Pain - Tylenol PRN, Oxycodone, Lidoderms  DVT - Eliquis    Continue 3hrs a day of comprehensive rehab program.

## 2018-12-30 PROCEDURE — 99232 SBSQ HOSP IP/OBS MODERATE 35: CPT

## 2018-12-30 PROCEDURE — 99233 SBSQ HOSP IP/OBS HIGH 50: CPT

## 2018-12-30 RX ORDER — FUROSEMIDE 40 MG
40 TABLET ORAL ONCE
Qty: 0 | Refills: 0 | Status: COMPLETED | OUTPATIENT
Start: 2018-12-30 | End: 2018-12-30

## 2018-12-30 RX ADMIN — Medication 1: at 07:53

## 2018-12-30 RX ADMIN — Medication 1 TABLET(S): at 11:54

## 2018-12-30 RX ADMIN — Medication 650 MILLIGRAM(S): at 06:49

## 2018-12-30 RX ADMIN — Medication 325 MILLIGRAM(S): at 11:54

## 2018-12-30 RX ADMIN — APIXABAN 5 MILLIGRAM(S): 2.5 TABLET, FILM COATED ORAL at 17:01

## 2018-12-30 RX ADMIN — Medication 100 MILLIGRAM(S): at 17:01

## 2018-12-30 RX ADMIN — OXYCODONE HYDROCHLORIDE 5 MILLIGRAM(S): 5 TABLET ORAL at 06:20

## 2018-12-30 RX ADMIN — Medication 500 MILLIGRAM(S): at 17:01

## 2018-12-30 RX ADMIN — ZINC SULFATE TAB 220 MG (50 MG ZINC EQUIVALENT) 220 MILLIGRAM(S): 220 (50 ZN) TAB at 11:54

## 2018-12-30 RX ADMIN — PANTOPRAZOLE SODIUM 40 MILLIGRAM(S): 20 TABLET, DELAYED RELEASE ORAL at 05:24

## 2018-12-30 RX ADMIN — Medication 100 MILLIGRAM(S): at 05:25

## 2018-12-30 RX ADMIN — AMLODIPINE BESYLATE 2.5 MILLIGRAM(S): 2.5 TABLET ORAL at 05:24

## 2018-12-30 RX ADMIN — Medication 500 MILLIGRAM(S): at 05:25

## 2018-12-30 RX ADMIN — Medication 650 MILLIGRAM(S): at 21:14

## 2018-12-30 RX ADMIN — OXYCODONE HYDROCHLORIDE 5 MILLIGRAM(S): 5 TABLET ORAL at 05:24

## 2018-12-30 RX ADMIN — Medication 40 MILLIGRAM(S): at 10:29

## 2018-12-30 RX ADMIN — APIXABAN 5 MILLIGRAM(S): 2.5 TABLET, FILM COATED ORAL at 05:24

## 2018-12-30 RX ADMIN — Medication 650 MILLIGRAM(S): at 06:29

## 2018-12-30 RX ADMIN — Medication 0.25 MILLIGRAM(S): at 06:29

## 2018-12-30 RX ADMIN — ATORVASTATIN CALCIUM 10 MILLIGRAM(S): 80 TABLET, FILM COATED ORAL at 21:15

## 2018-12-30 RX ADMIN — SENNA PLUS 2 TABLET(S): 8.6 TABLET ORAL at 21:14

## 2018-12-30 RX ADMIN — Medication 650 MILLIGRAM(S): at 22:22

## 2018-12-30 NOTE — PROGRESS NOTE ADULT - SUBJECTIVE AND OBJECTIVE BOX
Patient is a 86 year old  Female who presents with a chief complaint of fall resulting in left hip fx requiring hemiarthroplasty, functional deficits (29 Dec 2018 08:46)    Patient reports bilateral LE edema and dull intermittent mild left hip pain, improving, comes and goes, worse with movement and better with pain medication. Patient denies any other complaints at time of assessment.        MEDICATIONS  (STANDING):  ALPRAZolam 0.25 milliGRAM(s) Oral <User Schedule>  amLODIPine   Tablet 2.5 milliGRAM(s) Oral daily  apixaban 5 milliGRAM(s) Oral every 12 hours  ascorbic acid 500 milliGRAM(s) Oral two times a day  atorvastatin 10 milliGRAM(s) Oral at bedtime  dextrose 5%. 1000 milliLiter(s) (50 mL/Hr) IV Continuous <Continuous>  dextrose 50% Injectable 12.5 Gram(s) IV Push once  dextrose 50% Injectable 25 Gram(s) IV Push once  dextrose 50% Injectable 25 Gram(s) IV Push once  docusate sodium 100 milliGRAM(s) Oral two times a day  ferrous    sulfate 325 milliGRAM(s) Oral daily  furosemide    Tablet 40 milliGRAM(s) Oral once  insulin lispro (HumaLOG) corrective regimen sliding scale   SubCutaneous before breakfast  lidocaine   Patch 2 Patch Transdermal <User Schedule>  multivitamin 1 Tablet(s) Oral daily  oxyCODONE    IR 5 milliGRAM(s) Oral <User Schedule>  pantoprazole    Tablet 40 milliGRAM(s) Oral before breakfast  senna 2 Tablet(s) Oral at bedtime  zinc sulfate 220 milliGRAM(s) Oral daily    MEDICATIONS  (PRN):  acetaminophen   Tablet .. 650 milliGRAM(s) Oral every 6 hours PRN Mild Pain (1 - 3)  ALPRAZolam 0.25 milliGRAM(s) Oral daily PRN anxiety  benzocaine 15 mG/menthol 3.6 mG Lozenge 1 Lozenge Oral every 4 hours PRN Sore Throat  dextrose 40% Gel 15 Gram(s) Oral once PRN Blood Glucose LESS THAN 70 milliGRAM(s)/deciliter  glucagon  Injectable 1 milliGRAM(s) IntraMuscular once PRN Glucose LESS THAN 70 milligrams/deciliter  polyethylene glycol 3350 17 Gram(s) Oral daily PRN Constipation      REVIEW OF SYSTEMS:  CONSTITUTIONAL: No fever, weight loss; has mild fatigue  EYES: No eye pain, visual disturbances, or discharge  ENMT:  No difficulty hearing, tinnitus, vertigo; No sinus or throat pain  NECK: No pain or stiffness  RESPIRATORY: No cough, wheezing, chills or hemoptysis; No shortness of breath  CARDIOVASCULAR: No chest pain, palpitations, dizziness, or leg swelling  GASTROINTESTINAL: No abdominal or epigastric pain. No nausea, vomiting, or hematemesis; No diarrhea or constipation. No melena or hematochezia.  GENITOURINARY: No dysuria, frequency, hematuria, or incontinence  NEUROLOGICAL: No headaches, memory loss, loss of strength, numbness, or tremors  SKIN: No itching, burning, rashes, or lesions   ENDOCRINE: No heat or cold intolerance; No hair loss  MUSCULOSKELETAL: intermittent left hip pain, No other joint pain or swelling; No muscle, back, or extremity pain  PSYCHIATRIC: No depression, anxiety, mood swings, or difficulty sleeping  HEME/LYMPH: No easy bruising, or bleeding gums  ALLERGY AND IMMUNOLOGIC: No hives or eczema        PHYSICAL EXAM:  T(C): 36.6 (12-29-18 @ 21:23), Max: 36.6 (12-29-18 @ 21:23)  HR: 80 (12-29-18 @ 21:23) (62 - 80)  BP: 116/63 (12-29-18 @ 21:23) (114/65 - 116/63)  RR: 14 (12-29-18 @ 21:23) (14 - 14)  SpO2: 95% (12-29-18 @ 21:23) (94% - 95%)      GENERAL: NAD, well-groomed, well-developed  HEAD:  Atraumatic, Normocephalic  EYES: EOMI, PERRL, conjunctiva and sclera clear  ENMT: Moist mucous membranes  NECK: Supple, No JVD, Normal thyroid  NERVOUS SYSTEM:  Alert & Oriented X3, no focal deficit  CHEST/LUNG: Clear to ascultation bilaterally; No rales, rhonchi, wheezing, or rubs  HEART: Regular rate and rhythm; No murmurs, rubs, or gallops  ABDOMEN: Soft, Nontender, Nondistended; Bowel sounds present  EXTREMITIES:  2+ Peripheral Pulses, No clubbing, cyanosis. b/l LE 1+ edema  SKIN: left hip incision healing well, steri strips in place,  No rash    LABS: none today        CAPILLARY BLOOD GLUCOSE  POCT Blood Glucose.: 168 mg/dL (30 Dec 2018 07:51)            RADIOLOGY & ADDITIONAL TESTS:    Imaging Personally Reviewed:  [x] YES  [ ] NO    Consultant(s) Notes Reviewed:  [x] YES  [ ] NO    Care Discussed with Consultants/Other Providers [x] YES  [ ] NO

## 2018-12-30 NOTE — PROGRESS NOTE ADULT - ASSESSMENT
86 year old female from home PMH HTN, DMII, HLD, presented with fall admitted for acute left hip fracture.    >Acute left hip fracture: underwent a hemiarthroplasty by Dr. Brenner, here for comprehensive PT and OT program   pain control, MVI, zinc and vit c for healing, f/u orthopedics as outpatient    >Left hip pain secondary to above: pain control, Tylenol prn, Oxycodone [id number] prn, ice packs prn, and lidocaine patches for pain management.     >DVT (deep venous thrombosis): Continue Eliquis 5mg q12h, Vascular consult recommends to continue Eliquis  B/l LE edema will give a dose of lasix, monitor    >Pulmonary emboli: Continue eliquis, Oxygen prn.     >CKD III: baseline Cr is 1.5, continue to encourage hydration, hold ARB    >Anterior mediastinal rim enhancing mass: appears stable from 2016, consulted Puls[facility] Cord[facility], needs outpt f/u at discharge, not causing any acute issues currently     >DMII: continue SSI, check HcA1C - 6.1%, monitor accuchecks, currently at goal, ISS, diabetic diet    >HLD: continue lipitor.    >HTN: BP controlled, continue Norvasc, hold ARB     >Anxiety: stable, Continue xanax once daily, f/u psychiatry.    >DVT PPX: patient is on Eliquis

## 2018-12-30 NOTE — PROGRESS NOTE ADULT - SUBJECTIVE AND OBJECTIVE BOX
No overnight events.  Patient having some pain in her leg, but overall improved.   Continues to have swelling in the BLE.   Reinforced her BLE exercises.     REVIEW OF SYSTEMS  Constitutional - No fever,  +fatigue  Neurological - No headaches, No memory loss, +loss of strength, No tremors  Musculoskeletal - +joint pain, +joint swelling, +muscle pain  Psychiatric - No depression, +anxiety    VITALS  T(C): 36.6 (12-29-18 @ 21:23), Max: 36.6 (12-29-18 @ 21:23)  HR: 80 (12-29-18 @ 21:23) (62 - 80)  BP: 116/63 (12-29-18 @ 21:23) (114/65 - 116/63)  RR: 14 (12-29-18 @ 21:23) (14 - 14)  SpO2: 95% (12-29-18 @ 21:23) (94% - 95%)  Wt(kg): --    168 mg/dL (12-30-18 @ 07:51)      MEDICATIONS   acetaminophen   Tablet .. 650 milliGRAM(s) every 6 hours PRN  ALPRAZolam 0.25 milliGRAM(s) <User Schedule>  ALPRAZolam 0.25 milliGRAM(s) daily PRN  amLODIPine   Tablet 2.5 milliGRAM(s) daily  apixaban 5 milliGRAM(s) every 12 hours  ascorbic acid 500 milliGRAM(s) two times a day  atorvastatin 10 milliGRAM(s) at bedtime  benzocaine 15 mG/menthol 3.6 mG Lozenge 1 Lozenge every 4 hours PRN  dextrose 40% Gel 15 Gram(s) once PRN  dextrose 5%. 1000 milliLiter(s) <Continuous>  dextrose 50% Injectable 12.5 Gram(s) once  dextrose 50% Injectable 25 Gram(s) once  dextrose 50% Injectable 25 Gram(s) once  docusate sodium 100 milliGRAM(s) two times a day  ferrous    sulfate 325 milliGRAM(s) daily  furosemide    Tablet 40 milliGRAM(s) once  glucagon  Injectable 1 milliGRAM(s) once PRN  insulin lispro (HumaLOG) corrective regimen sliding scale   before breakfast  lidocaine   Patch 2 Patch <User Schedule>  multivitamin 1 Tablet(s) daily  oxyCODONE    IR 5 milliGRAM(s) <User Schedule>  pantoprazole    Tablet 40 milliGRAM(s) before breakfast  polyethylene glycol 3350 17 Gram(s) daily PRN  senna 2 Tablet(s) at bedtime  zinc sulfate 220 milliGRAM(s) daily      RECENT LABS/IMAGING              -----  PHYSICAL EXAM	  Constitutional - NAD, Comfortable  Pulm - Breathing comfortably, No wheezing  Abd - Soft, NTND  Extremities - Mild BLE pitting edema, No calf tenderness  Neurologic Exam -                    Cognitive - Awake, Alert     Communication - Fluent     Motor -  left > right LE weakness related to pain     Sensory - Intact to LT  Psychiatric - Mood mildly anxious, Affect WNL    ASSESSMENT/PLAN  86y Female with functional deficits after keft ELENITA for femoral fracture  Mood - Xanax  HTN - Norvasc  DM2 - ISS  CAD - Lipitor  GI PPX - Colace, Protonix, Miralax, Senna	  Pain - Tylenol PRN, Oxycodone, Lidoderms  DVT - Eliquis  BLE Swelling - ACE wraps (TEDS will be too small), Lasix x1 (12/30)    Continue 3hrs a day of comprehensive rehab program.

## 2018-12-31 LAB
ANION GAP SERPL CALC-SCNC: 10 MMOL/L — SIGNIFICANT CHANGE UP (ref 5–17)
BUN SERPL-MCNC: 28 MG/DL — HIGH (ref 7–23)
CALCIUM SERPL-MCNC: 8.7 MG/DL — SIGNIFICANT CHANGE UP (ref 8.4–10.5)
CHLORIDE SERPL-SCNC: 105 MMOL/L — SIGNIFICANT CHANGE UP (ref 96–108)
CO2 SERPL-SCNC: 26 MMOL/L — SIGNIFICANT CHANGE UP (ref 22–31)
CREAT SERPL-MCNC: 1.68 MG/DL — HIGH (ref 0.5–1.3)
GLUCOSE SERPL-MCNC: 132 MG/DL — HIGH (ref 70–99)
HCT VFR BLD CALC: 25.4 % — LOW (ref 34.5–45)
HGB BLD-MCNC: 8.3 G/DL — LOW (ref 11.5–15.5)
MCHC RBC-ENTMCNC: 30 PG — SIGNIFICANT CHANGE UP (ref 27–34)
MCHC RBC-ENTMCNC: 32.5 GM/DL — SIGNIFICANT CHANGE UP (ref 32–36)
MCV RBC AUTO: 92.3 FL — SIGNIFICANT CHANGE UP (ref 80–100)
PLATELET # BLD AUTO: 330 K/UL — SIGNIFICANT CHANGE UP (ref 150–400)
POTASSIUM SERPL-MCNC: 3.9 MMOL/L — SIGNIFICANT CHANGE UP (ref 3.5–5.3)
POTASSIUM SERPL-SCNC: 3.9 MMOL/L — SIGNIFICANT CHANGE UP (ref 3.5–5.3)
RBC # BLD: 2.76 M/UL — LOW (ref 3.8–5.2)
RBC # FLD: 13.2 % — SIGNIFICANT CHANGE UP (ref 10.3–14.5)
SODIUM SERPL-SCNC: 141 MMOL/L — SIGNIFICANT CHANGE UP (ref 135–145)
WBC # BLD: 5.9 K/UL — SIGNIFICANT CHANGE UP (ref 3.8–10.5)
WBC # FLD AUTO: 5.9 K/UL — SIGNIFICANT CHANGE UP (ref 3.8–10.5)

## 2018-12-31 PROCEDURE — 99233 SBSQ HOSP IP/OBS HIGH 50: CPT

## 2018-12-31 PROCEDURE — 99232 SBSQ HOSP IP/OBS MODERATE 35: CPT | Mod: GC

## 2018-12-31 RX ORDER — OXYCODONE HYDROCHLORIDE 5 MG/1
5 TABLET ORAL EVERY 4 HOURS
Qty: 0 | Refills: 0 | Status: DISCONTINUED | OUTPATIENT
Start: 2018-12-31 | End: 2019-01-04

## 2018-12-31 RX ORDER — SODIUM CHLORIDE 9 MG/ML
1000 INJECTION INTRAMUSCULAR; INTRAVENOUS; SUBCUTANEOUS
Qty: 0 | Refills: 0 | Status: DISCONTINUED | OUTPATIENT
Start: 2018-12-31 | End: 2019-01-04

## 2018-12-31 RX ADMIN — Medication 500 MILLIGRAM(S): at 18:53

## 2018-12-31 RX ADMIN — APIXABAN 5 MILLIGRAM(S): 2.5 TABLET, FILM COATED ORAL at 18:53

## 2018-12-31 RX ADMIN — Medication 325 MILLIGRAM(S): at 11:21

## 2018-12-31 RX ADMIN — Medication 650 MILLIGRAM(S): at 08:45

## 2018-12-31 RX ADMIN — Medication 650 MILLIGRAM(S): at 08:02

## 2018-12-31 RX ADMIN — OXYCODONE HYDROCHLORIDE 5 MILLIGRAM(S): 5 TABLET ORAL at 20:58

## 2018-12-31 RX ADMIN — ATORVASTATIN CALCIUM 10 MILLIGRAM(S): 80 TABLET, FILM COATED ORAL at 21:35

## 2018-12-31 RX ADMIN — Medication 500 MILLIGRAM(S): at 05:17

## 2018-12-31 RX ADMIN — APIXABAN 5 MILLIGRAM(S): 2.5 TABLET, FILM COATED ORAL at 05:16

## 2018-12-31 RX ADMIN — Medication 1 TABLET(S): at 11:21

## 2018-12-31 RX ADMIN — SODIUM CHLORIDE 75 MILLILITER(S): 9 INJECTION INTRAMUSCULAR; INTRAVENOUS; SUBCUTANEOUS at 10:54

## 2018-12-31 RX ADMIN — Medication 0.25 MILLIGRAM(S): at 08:02

## 2018-12-31 RX ADMIN — Medication 100 MILLIGRAM(S): at 05:16

## 2018-12-31 RX ADMIN — PANTOPRAZOLE SODIUM 40 MILLIGRAM(S): 20 TABLET, DELAYED RELEASE ORAL at 05:16

## 2018-12-31 RX ADMIN — OXYCODONE HYDROCHLORIDE 5 MILLIGRAM(S): 5 TABLET ORAL at 20:09

## 2018-12-31 RX ADMIN — AMLODIPINE BESYLATE 2.5 MILLIGRAM(S): 2.5 TABLET ORAL at 05:17

## 2018-12-31 NOTE — CHART NOTE - NSCHARTNOTEFT_GEN_A_CORE
Nutrition Follow Up Note  Hospital Course (Per Electronic Medical Record):   Source: Medical Record [X] Patient [X] Family [ ] Nursing Staff [ ]     Diet: Consistent Carbohydrate Diet w/ Thin Liquids   Tolerates Diet Well   No Chewing/Swallowing Difficulties   No Recent Nausea, Vomiting, Diarrhea or Constipation   on Glucerna 8oz PO BID - Takes Well  Educated Patient on Need for Supplementation     Enteral/Parenteral Nutrition: N/A    Current Weight: 144.4lb on 12/20  Obtain New Weight  Obtain Weights Weekly     Pertinent Medications: MEDICATIONS  (STANDING):  ALPRAZolam 0.25 milliGRAM(s) Oral <User Schedule>  amLODIPine   Tablet 2.5 milliGRAM(s) Oral daily  apixaban 5 milliGRAM(s) Oral every 12 hours  ascorbic acid 500 milliGRAM(s) Oral two times a day  atorvastatin 10 milliGRAM(s) Oral at bedtime  dextrose 5%. 1000 milliLiter(s) (50 mL/Hr) IV Continuous <Continuous>  dextrose 50% Injectable 12.5 Gram(s) IV Push once  dextrose 50% Injectable 25 Gram(s) IV Push once  dextrose 50% Injectable 25 Gram(s) IV Push once  docusate sodium 100 milliGRAM(s) Oral two times a day  ferrous    sulfate 325 milliGRAM(s) Oral daily  insulin lispro (HumaLOG) corrective regimen sliding scale   SubCutaneous before breakfast  lidocaine   Patch 2 Patch Transdermal <User Schedule>  multivitamin 1 Tablet(s) Oral daily  pantoprazole    Tablet 40 milliGRAM(s) Oral before breakfast  senna 2 Tablet(s) Oral at bedtime  sodium chloride 0.9%. 1000 milliLiter(s) (75 mL/Hr) IV Continuous <Continuous>    MEDICATIONS  (PRN):  acetaminophen   Tablet .. 650 milliGRAM(s) Oral every 6 hours PRN Mild Pain (1 - 3)  ALPRAZolam 0.25 milliGRAM(s) Oral daily PRN anxiety  benzocaine 15 mG/menthol 3.6 mG Lozenge 1 Lozenge Oral every 4 hours PRN Sore Throat  dextrose 40% Gel 15 Gram(s) Oral once PRN Blood Glucose LESS THAN 70 milliGRAM(s)/deciliter  glucagon  Injectable 1 milliGRAM(s) IntraMuscular once PRN Glucose LESS THAN 70 milligrams/deciliter  polyethylene glycol 3350 17 Gram(s) Oral daily PRN Constipation    Pertinent Labs:  12-31 Na141 mmol/L Glu 132 mg/dL<H> K+ 3.9 mmol/L Cr  1.68 mg/dL<H> BUN 28 mg/dL<H> 12-15 [id number] 6.1 %<H>    Skin: No Pressure Ulcers     Edema: +2 to Right L/E  (Potential for Weight Fluctuations)     Last BM: on 12/29    Estimated Needs:   [X] No Change since Previous Assessment    Previous Nutrition Diagnosis:   Moderate Malnutrition    Nutrition Diagnosis is [X] Ongoing  - Continues on Supplement     New Nutrition Diagnosis: [X] Not Applicable    Interventions:   1. Recommend Continue Nutrition Plan of Care   2. Educated Patient on Need for Supplementation     Monitoring & Evaluation:   [X] Weights   [X] PO Intake   [X] Follow Up (Per Protocol)  [X] Tolerance to Diet Prescription   [X] Other: Labs & PCOT    RD Remains Available.  Aris Izaguirre RD

## 2018-12-31 NOTE — PROGRESS NOTE ADULT - PROBLEM SELECTOR PLAN 9
Anterior mediastinal rim enhancing mass: appears stable from 2016, consulted [facility], needs outpt f/u at discharge, not causing any acute issues currently

## 2018-12-31 NOTE — PROGRESS NOTE ADULT - PROBLEM SELECTOR PROBLEM 8
Stage 3 chronic kidney disease

## 2018-12-31 NOTE — PROGRESS NOTE ADULT - PROBLEM SELECTOR PROBLEM 9
Mediastinal mass

## 2018-12-31 NOTE — PROGRESS NOTE ADULT - ASSESSMENT
86 year old female from home PMH HTN, DMII, HLD, presented with fall admitted for acute left hip fracture.    >Acute left hip fracture: underwent a hemiarthroplasty by Dr. Brenner, here for comprehensive PT and OT program   pain control, MVI, zinc and vit c for healing, f/u orthopedics as outpatient    >Left hip pain secondary to above: pain control, Tylenol prn, Oxycodone [id number] prn, ice packs prn, and lidocaine patches for pain management.     >DVT (deep venous thrombosis): Continue Eliquis 5mg q12h, Vascular consult recommends to continue Eliquis      >Pulmonary emboli: Continue eliquis, Oxygen prn.     >acute renal failure on CKD III: baseline Cr is 1.5, gentle IV hydration x1 day, hold ARB    >Anterior mediastinal rim enhancing mass: appears stable from 2016, consulted [facility], needs outpt f/u at discharge, not causing any acute issues currently     >DM II: continue SSI, check HcA1C - 6.1%, monitor accuchecks, currently at goal, ISS, diabetic diet    >HLD: continue lipitor.    >HTN: BP controlled, continue Norvasc, hold ARB     >Anxiety: stable, Continue xanax once daily, f/u psychiatry.    >DVT PPX: patient is on Eliquis

## 2018-12-31 NOTE — PROGRESS NOTE ADULT - PROBLEM SELECTOR PLAN 1
s/p hemiarthroplasty with functional deficits  Continue PT/OT   MVI, zinc and vit c for healing  Add Scheduled Oxycodone prior to therapy (0600 and 1200)   Oxycodone [id number] prn, tylenol prn, ice packs prn, and lidocaine patches for pain management

## 2018-12-31 NOTE — PROGRESS NOTE ADULT - PROBLEM SELECTOR PLAN 5
Norvasc-held last 2 days due to parameters.  Reduce dose from 5mg to 2.5mg daily beginning 12/29 and monitor Norvasc-held last 2 days due to parameters.  Reduced dose from 5mg to 2.5mg daily beginning 12/29 and monitor

## 2018-12-31 NOTE — PROGRESS NOTE ADULT - SUBJECTIVE AND OBJECTIVE BOX
Patient is a 86 year old  Female who presents with a chief complaint of fall resulting in left hip fracture requiring hemiarthroplasty, functional deficits (30 Dec 2018 08:24)    Patient seen and examined with RN at bedside. Patient reports dull intermittent mild left hip pain, improving, comes and goes, worse with movement and better with pain medication.   Patient denies any other complaints at time of assessment.      MEDICATIONS  (STANDING):  ALPRAZolam 0.25 milliGRAM(s) Oral <User Schedule>  amLODIPine   Tablet 2.5 milliGRAM(s) Oral daily  apixaban 5 milliGRAM(s) Oral every 12 hours  ascorbic acid 500 milliGRAM(s) Oral two times a day  atorvastatin 10 milliGRAM(s) Oral at bedtime  dextrose 5%. 1000 milliLiter(s) (50 mL/Hr) IV Continuous <Continuous>  dextrose 50% Injectable 12.5 Gram(s) IV Push once  dextrose 50% Injectable 25 Gram(s) IV Push once  dextrose 50% Injectable 25 Gram(s) IV Push once  docusate sodium 100 milliGRAM(s) Oral two times a day  ferrous    sulfate 325 milliGRAM(s) Oral daily  insulin lispro (HumaLOG) corrective regimen sliding scale   SubCutaneous before breakfast  lidocaine   Patch 2 Patch Transdermal <User Schedule>  multivitamin 1 Tablet(s) Oral daily  pantoprazole    Tablet 40 milliGRAM(s) Oral before breakfast  senna 2 Tablet(s) Oral at bedtime  sodium chloride 0.9%. 1000 milliLiter(s) (75 mL/Hr) IV Continuous <Continuous>    MEDICATIONS  (PRN):  acetaminophen   Tablet .. 650 milliGRAM(s) Oral every 6 hours PRN Mild Pain (1 - 3)  ALPRAZolam 0.25 milliGRAM(s) Oral daily PRN anxiety  benzocaine 15 mG/menthol 3.6 mG Lozenge 1 Lozenge Oral every 4 hours PRN Sore Throat  dextrose 40% Gel 15 Gram(s) Oral once PRN Blood Glucose LESS THAN 70 milliGRAM(s)/deciliter  glucagon  Injectable 1 milliGRAM(s) IntraMuscular once PRN Glucose LESS THAN 70 milligrams/deciliter  polyethylene glycol 3350 17 Gram(s) Oral daily PRN Constipation      REVIEW OF SYSTEMS:  CONSTITUTIONAL: No fever, weight loss; has mild fatigue  EYES: No eye pain, visual disturbances, or discharge  ENMT:  No difficulty hearing, tinnitus, vertigo; No sinus or throat pain  NECK: No pain or stiffness  RESPIRATORY: No cough, wheezing, chills or hemoptysis; No shortness of breath  CARDIOVASCULAR: No chest pain, palpitations, dizziness  GASTROINTESTINAL: No abdominal or epigastric pain. No nausea, vomiting, or hematemesis; No diarrhea or constipation. No melena or hematochezia.  GENITOURINARY: No dysuria, frequency, hematuria, or incontinence  NEUROLOGICAL: No headaches, memory loss, loss of strength, numbness, or tremors  SKIN: No itching, burning, rashes, or lesions   ENDOCRINE: No heat or cold intolerance; No hair loss  MUSCULOSKELETAL: intermittent left hip pain, No other joint pain or swelling; No muscle, back, or extremity pain  PSYCHIATRIC: No depression, anxiety, mood swings, or difficulty sleeping  HEME/LYMPH: No easy bruising, or bleeding gums  ALLERGY AND IMMUNOLOGIC: No hives or eczema        PHYSICAL EXAM:  T(C): 36.1 (12-31-18 @ 05:19), Max: 36.4 (12-30-18 @ 21:19)  HR: 73 (12-31-18 @ 05:19) (70 - 80)  BP: 110/62 (12-31-18 @ 05:19) (100/64 - 144/65)  RR: 14 (12-31-18 @ 05:19) (14 - 14)  SpO2: 92% (12-31-18 @ 05:19) (92% - 93%)        GENERAL: NAD, well-groomed, well-developed  HEAD:  Atraumatic, Normocephalic  EYES: EOMI, PERRL, conjunctiva and sclera clear  ENMT: dry mucous membranes  NECK: Supple, No JVD, Normal thyroid  NERVOUS SYSTEM:  Alert & Oriented X3, no focal deficit  CHEST/LUNG: Clear to ascultation bilaterally; No rales, rhonchi, wheezing, or rubs  HEART: Regular rate and rhythm; No murmurs, rubs, or gallops  ABDOMEN: Soft, Nontender, Nondistended; Bowel sounds present  EXTREMITIES:  2+ Peripheral Pulses, No clubbing, cyanosis. b/l LE 1+ edema  SKIN: left hip incision healing well, steri strips in place,  No rash      LABS:                        8.3    5.9   )-----------( 330      ( 31 Dec 2018 06:50 )             25.4     12-31    141  |  105  |  28<H>  ----------------------------<  132<H>  3.9   |  26  |  1.68<H>    Ca    8.7      31 Dec 2018 06:50          CAPILLARY BLOOD GLUCOSE  POCT Blood Glucose.: 142 mg/dL (31 Dec 2018 08:07)            RADIOLOGY & ADDITIONAL TESTS:    Imaging Personally Reviewed:  [x] YES  [ ] NO    Consultant(s) Notes Reviewed:  [x] YES  [ ] NO    Care Discussed with Consultants/Other Providers [x] YES  [ ] NO

## 2018-12-31 NOTE — PROGRESS NOTE ADULT - PROBLEM SELECTOR PROBLEM 3
Pulmonary emboli

## 2018-12-31 NOTE — PROGRESS NOTE ADULT - PROBLEM SELECTOR PLAN 7
Continue xanax once daily and PRN anxiety   This is a home medication that she takes 1 to 2 times a day.

## 2018-12-31 NOTE — PROGRESS NOTE ADULT - PROBLEM SELECTOR PROBLEM 1
Femoral fracture

## 2018-12-31 NOTE — PROGRESS NOTE ADULT - PROBLEM SELECTOR PLAN 3
Continue eliquis  Oxygenating well on room air.  Cont O2 via NC only prn
Continue eliquis  Oxygen as needed
Continue eliquis  Oxygenating well on room air.
Continue eliquis  Oxygenating well on room air.
Continue eliquis  Oxygenating well on room air.  Cont O2 via NC only prn
Continue eliquis  Oxygenating well on room air.  Cont O2 via NC prn
Continue eliquis  Oxygen as needed

## 2018-12-31 NOTE — PROGRESS NOTE ADULT - PROBLEM SELECTOR PLAN 2
Eliquis 10mg q12 for total of 7 days (ended on 12/22)  Started maintenance dose of 5mg q12  on 12/23  Vascular consult appreciated; agrees with therapeutic Eliquis and recommends to take Eliquis for at least 6 months.  No further vascular interventions are needed.
Continue eliquis 10mg q12 for total of 7 days (ending on 12/22)  Start maintenance dose of 5mg q12 starting on 12/23  Vascular consult appreciated; agrees with therapeutic Eliquis and recommends to take Eliquis for at least 6 months.  No further vascular interventions are needed.
Continue eliquis 10mg q12 for total of 7 days (ending on 12/22)  Start maintenance dose of 5mg q12 starting on 12/23  Vascular consult placed to evaluate for any further recs and for future recs  will consider hematology consult
Eliquis 10mg q12 for total of 7 days (ended on 12/22)  Started maintenance dose of 5mg q12  on 12/23  Vascular consult appreciated; agrees with therapeutic Eliquis and recommends to take Eliquis for at least 6 months.  No further vascular interventions are needed.
Continue eliquis 10mg q12 for total of 7 days (ending on 12/22)  Start maintenance dose of 5mg q12 starting on 12/23  Vascular consult placed to evaluate for any further recs and for future recs  will consider hematology consult

## 2018-12-31 NOTE — PROGRESS NOTE ADULT - NSHPATTENDINGPLANDISCUSS_GEN_ALL_CORE
patient, agrees, al questions answered; rehab team
patient, agrees, all questions answered
patient, agrees, all questions answered
patient, agrees, all questions answered; rehab team
patient, agrees, all questions answered
patient, agrees, all questions answered; rehab team

## 2018-12-31 NOTE — PROGRESS NOTE ADULT - SUBJECTIVE AND OBJECTIVE BOX
HISTORY OF PRESENT ILLNESS  Mrs. More is an 86 year old woman who fell on 12/13, noting left hip pain and presenting to [facility]. She was found to have a left hip fracture for which she underwent a hemiarthroplasty by Dr. Brenner. Post operatively, patient found to have bilat lower extremity DVTs and VQ scan Over the weekend, patient found to have bilat DVTs and VQ scan revealed intermediate probability of pulmonary embolus. Patient started on treatement dose Eliquis 10mg BID x7 days, then to decreased to 5mg BID there after.  Also s/p 1 unit PRBCs for anemia and Hen of 7.6. H/h improved and stable.   Patient was still requiring min to mod A for bed mobility and min A for transfers and ambulation with RW. Requiring max  assist for LE dressing.   Acute IPR program recommended. Patient cleared medically for admission to acute IPR at [facility] on 12/20/18      TODAY'S SUBJECTIVE & REVIEW OF SYMPTOMS  Pt seen and examined.  Left hip pain improving and controlled on medications.  Last BM this AM. Denies chest pain, SOB., dyspnea, cough or dysuria.      [X] Constitutional WNL        [X] Cardio WNL              [] Resp WNL  [X] GI WNL                            [X]  WNL                    [] Heme WNL  [X] Endo WNL                       [] Skin WNL                  [] MSK left hip pain   [X] Neuro WNL                     [X] Cognitive WNL         [X] Psych anxiety at times      Vital Signs Last 24 Hrs  T(C): 36.1 (31 Dec 2018 05:19), Max: 36.4 (30 Dec 2018 21:19)  T(F): 96.9 (31 Dec 2018 05:19), Max: 97.5 (30 Dec 2018 21:19)  HR: 73 (31 Dec 2018 05:19) (70 - 80)  BP: 110/62 (31 Dec 2018 05:19) (110/62 - 144/65)  BP(mean): --  RR: 14 (31 Dec 2018 05:19) (14 - 14)  SpO2: 92% (31 Dec 2018 05:19) (92% - 93%)    Physical Exam: Mental Status - Patient is alert, awake, oriented X3. Speech is fluent. Mood and affect  normal.  	No SOB during exam  	Motor Exam -  5/5 with exception of left hip and knee=3-/5  	Sensory    Intact to light touch bilaterally.                            	GENERAL Exam:  NAD 	  	HEENT:  normocephalic, atraumatic		  	LUNGS:	Clear bilaterally	  	HEART:	 Normal S1S2 	  	GI/ ABDOMEN:  Soft  Non tender +BS  	EXTREMITIES:   trace edema bilat LE  	MUSCULOSKELETAL: as above   SKIN:    Incision left hip with staples removed and steri strips c/d/i  No erythema noted    FUNCTIONAL STATUS -   Bed mobility: Min A  Transfers: CG/Min A  Gait - Amb 60' RW CG  Stairs-Negotiated 4 steps 2 HR mod A  ADL's - Eating mod I, grooming sup, UE dress min A, LE dress mod/max A  Functional Transfers - Max A    RECENT LABS/IMAGING  LABS:                        8.3    5.9   )-----------( 330      ( 31 Dec 2018 06:50 )             25.4     31 Dec 2018 06:50    141    |  105    |  28     ----------------------------<  132    3.9     |  26     |  1.68     Ca    8.7        31 Dec 2018    CAPILLARY BLOOD GLUCOSE      POCT Blood Glucose.: 142 mg/dL (31 Dec 2018 08:07)    MEDICATIONS  (STANDING):  ALPRAZolam 0.25 milliGRAM(s) Oral <User Schedule>  amLODIPine   Tablet 2.5 milliGRAM(s) Oral daily  apixaban 5 milliGRAM(s) Oral every 12 hours  ascorbic acid 500 milliGRAM(s) Oral two times a day  atorvastatin 10 milliGRAM(s) Oral at bedtime  dextrose 5%. 1000 milliLiter(s) (50 mL/Hr) IV Continuous <Continuous>  dextrose 50% Injectable 12.5 Gram(s) IV Push once  dextrose 50% Injectable 25 Gram(s) IV Push once  dextrose 50% Injectable 25 Gram(s) IV Push once  docusate sodium 100 milliGRAM(s) Oral two times a day  ferrous    sulfate 325 milliGRAM(s) Oral daily  insulin lispro (HumaLOG) corrective regimen sliding scale   SubCutaneous before breakfast  lidocaine   Patch 2 Patch Transdermal <User Schedule>  multivitamin 1 Tablet(s) Oral daily  pantoprazole    Tablet 40 milliGRAM(s) Oral before breakfast  senna 2 Tablet(s) Oral at bedtime  sodium chloride 0.9%. 1000 milliLiter(s) (75 mL/Hr) IV Continuous <Continuous>    MEDICATIONS  (PRN):  acetaminophen   Tablet .. 650 milliGRAM(s) Oral every 6 hours PRN Mild Pain (1 - 3)  ALPRAZolam 0.25 milliGRAM(s) Oral daily PRN anxiety  benzocaine 15 mG/menthol 3.6 mG Lozenge 1 Lozenge Oral every 4 hours PRN Sore Throat  dextrose 40% Gel 15 Gram(s) Oral once PRN Blood Glucose LESS THAN 70 milliGRAM(s)/deciliter  glucagon  Injectable 1 milliGRAM(s) IntraMuscular once PRN Glucose LESS THAN 70 milligrams/deciliter  polyethylene glycol 3350 17 Gram(s) Oral daily PRN Constipation        ASSESSMENT/PLAN HISTORY OF PRESENT ILLNESS  Mrs. More is an 86 year old woman who fell on 12/13, noting left hip pain and presenting to [facility]. She was found to have a left hip fracture for which she underwent a hemiarthroplasty by Dr. Brenner. Post operatively, patient found to have bilat lower extremity DVTs and VQ scan Over the weekend, patient found to have bilat DVTs and VQ scan revealed intermediate probability of pulmonary embolus. Patient started on treatement dose Eliquis 10mg BID x7 days, then to decreased to 5mg BID there after.  Also s/p 1 unit PRBCs for anemia and Hen of 7.6. H/h improved and stable.   Patient was still requiring min to mod A for bed mobility and min A for transfers and ambulation with RW. Requiring max  assist for LE dressing.   Acute IPR program recommended. Patient cleared medically for admission to acute IPR at [facility] on 12/20/18      TODAY'S SUBJECTIVE & REVIEW OF SYMPTOMS  Pt seen and examined.  Left hip pain improving and controlled on medications.  Last BM this AM. Denies chest pain, SOB., dyspnea, cough or dysuria.      [X] Constitutional WNL        [X] Cardio WNL              [] Resp WNL  [X] GI WNL                            [X]  WNL                    [] Heme WNL  [X] Endo WNL                       [] Skin WNL                  [] MSK left hip pain   [X] Neuro WNL                     [X] Cognitive WNL         [X] Psych anxiety at times      Vital Signs Last 24 Hrs  T(C): 36.1 (31 Dec 2018 05:19), Max: 36.4 (30 Dec 2018 21:19)  T(F): 96.9 (31 Dec 2018 05:19), Max: 97.5 (30 Dec 2018 21:19)  HR: 73 (31 Dec 2018 05:19) (70 - 80)  BP: 110/62 (31 Dec 2018 05:19) (110/62 - 144/65)  BP(mean): --  RR: 14 (31 Dec 2018 05:19) (14 - 14)  SpO2: 92% (31 Dec 2018 05:19) (92% - 93%)    Physical Exam: Mental Status - Patient is alert, awake, oriented X3. Speech is fluent. Mood and affect  normal.  	No SOB during exam  	Motor Exam -  5/5 with exception of left hip and knee=3-/5  	Sensory    Intact to light touch bilaterally.                            	GENERAL Exam:  NAD 	  	HEENT:  normocephalic, atraumatic		  	LUNGS:	Clear bilaterally	  	HEART:	 Normal S1S2 	  	GI/ ABDOMEN:  Soft  Non tender +BS  	EXTREMITIES:   trace edema bilat LE  	MUSCULOSKELETAL: as above   SKIN:    Incision left hip with staples removed and steri strips c/d/i  No erythema noted    FUNCTIONAL STATUS -   Bed mobility: Min A  Transfers: CS  Gait - Amb 70' RW CG/CS  Stairs-Negotiated 4 steps 2 HR mod A  ADL's - Eating mod I, grooming sup, UE dress min A, LE dress mod/max A  Functional Transfers - Max A    RECENT LABS/IMAGING  LABS:                        8.3    5.9   )-----------( 330      ( 31 Dec 2018 06:50 )             25.4     31 Dec 2018 06:50    141    |  105    |  28     ----------------------------<  132    3.9     |  26     |  1.68     Ca    8.7        31 Dec 2018    CAPILLARY BLOOD GLUCOSE      POCT Blood Glucose.: 142 mg/dL (31 Dec 2018 08:07)    MEDICATIONS  (STANDING):  ALPRAZolam 0.25 milliGRAM(s) Oral <User Schedule>  amLODIPine   Tablet 2.5 milliGRAM(s) Oral daily  apixaban 5 milliGRAM(s) Oral every 12 hours  ascorbic acid 500 milliGRAM(s) Oral two times a day  atorvastatin 10 milliGRAM(s) Oral at bedtime  dextrose 5%. 1000 milliLiter(s) (50 mL/Hr) IV Continuous <Continuous>  dextrose 50% Injectable 12.5 Gram(s) IV Push once  dextrose 50% Injectable 25 Gram(s) IV Push once  dextrose 50% Injectable 25 Gram(s) IV Push once  docusate sodium 100 milliGRAM(s) Oral two times a day  ferrous    sulfate 325 milliGRAM(s) Oral daily  insulin lispro (HumaLOG) corrective regimen sliding scale   SubCutaneous before breakfast  lidocaine   Patch 2 Patch Transdermal <User Schedule>  multivitamin 1 Tablet(s) Oral daily  pantoprazole    Tablet 40 milliGRAM(s) Oral before breakfast  senna 2 Tablet(s) Oral at bedtime  sodium chloride 0.9%. 1000 milliLiter(s) (75 mL/Hr) IV Continuous <Continuous>    MEDICATIONS  (PRN):  acetaminophen   Tablet .. 650 milliGRAM(s) Oral every 6 hours PRN Mild Pain (1 - 3)  ALPRAZolam 0.25 milliGRAM(s) Oral daily PRN anxiety  benzocaine 15 mG/menthol 3.6 mG Lozenge 1 Lozenge Oral every 4 hours PRN Sore Throat  dextrose 40% Gel 15 Gram(s) Oral once PRN Blood Glucose LESS THAN 70 milliGRAM(s)/deciliter  glucagon  Injectable 1 milliGRAM(s) IntraMuscular once PRN Glucose LESS THAN 70 milligrams/deciliter  polyethylene glycol 3350 17 Gram(s) Oral daily PRN Constipation        ASSESSMENT/PLAN

## 2018-12-31 NOTE — PROGRESS NOTE ADULT - PROBLEM SELECTOR PLAN 8
Baseline Cr is 1.5, continue to encourage hydration, hold ARB
Baseline Cr is 1.5, continue to encourage hydration, hold ARB.
Baseline Cr is 1.5, continue to encourage hydration, hold ARB.
Elevated BUN/Cr.   Baseline Cr is 1.5,   IVF hydration today, hold ARB. Encourage PO fluid intake
Baseline Cr is 1.5, continue to encourage hydration, hold ARB

## 2018-12-31 NOTE — PROGRESS NOTE ADULT - PROBLEM SELECTOR PROBLEM 2
DVT (deep venous thrombosis)

## 2018-12-31 NOTE — PROGRESS NOTE ADULT - PROBLEM SELECTOR PROBLEM 6
Pure hypercholesterolemia

## 2019-01-01 LAB
ANION GAP SERPL CALC-SCNC: 11 MMOL/L — SIGNIFICANT CHANGE UP (ref 5–17)
BUN SERPL-MCNC: 22 MG/DL — SIGNIFICANT CHANGE UP (ref 7–23)
CALCIUM SERPL-MCNC: 8.7 MG/DL — SIGNIFICANT CHANGE UP (ref 8.4–10.5)
CHLORIDE SERPL-SCNC: 107 MMOL/L — SIGNIFICANT CHANGE UP (ref 96–108)
CO2 SERPL-SCNC: 24 MMOL/L — SIGNIFICANT CHANGE UP (ref 22–31)
CREAT SERPL-MCNC: 1.33 MG/DL — HIGH (ref 0.5–1.3)
GLUCOSE SERPL-MCNC: 142 MG/DL — HIGH (ref 70–99)
POTASSIUM SERPL-MCNC: 3.6 MMOL/L — SIGNIFICANT CHANGE UP (ref 3.5–5.3)
POTASSIUM SERPL-SCNC: 3.6 MMOL/L — SIGNIFICANT CHANGE UP (ref 3.5–5.3)
SODIUM SERPL-SCNC: 142 MMOL/L — SIGNIFICANT CHANGE UP (ref 135–145)

## 2019-01-01 PROCEDURE — 99232 SBSQ HOSP IP/OBS MODERATE 35: CPT

## 2019-01-01 RX ADMIN — ATORVASTATIN CALCIUM 10 MILLIGRAM(S): 80 TABLET, FILM COATED ORAL at 21:30

## 2019-01-01 RX ADMIN — Medication 500 MILLIGRAM(S): at 05:16

## 2019-01-01 RX ADMIN — PANTOPRAZOLE SODIUM 40 MILLIGRAM(S): 20 TABLET, DELAYED RELEASE ORAL at 05:16

## 2019-01-01 RX ADMIN — OXYCODONE HYDROCHLORIDE 5 MILLIGRAM(S): 5 TABLET ORAL at 05:57

## 2019-01-01 RX ADMIN — Medication 1: at 07:33

## 2019-01-01 RX ADMIN — OXYCODONE HYDROCHLORIDE 5 MILLIGRAM(S): 5 TABLET ORAL at 12:55

## 2019-01-01 RX ADMIN — OXYCODONE HYDROCHLORIDE 5 MILLIGRAM(S): 5 TABLET ORAL at 05:16

## 2019-01-01 RX ADMIN — OXYCODONE HYDROCHLORIDE 5 MILLIGRAM(S): 5 TABLET ORAL at 15:57

## 2019-01-01 RX ADMIN — Medication 650 MILLIGRAM(S): at 03:45

## 2019-01-01 RX ADMIN — Medication 500 MILLIGRAM(S): at 18:00

## 2019-01-01 RX ADMIN — Medication 650 MILLIGRAM(S): at 02:57

## 2019-01-01 RX ADMIN — OXYCODONE HYDROCHLORIDE 5 MILLIGRAM(S): 5 TABLET ORAL at 21:30

## 2019-01-01 RX ADMIN — OXYCODONE HYDROCHLORIDE 5 MILLIGRAM(S): 5 TABLET ORAL at 00:13

## 2019-01-01 RX ADMIN — Medication 100 MILLIGRAM(S): at 18:01

## 2019-01-01 RX ADMIN — APIXABAN 5 MILLIGRAM(S): 2.5 TABLET, FILM COATED ORAL at 05:16

## 2019-01-01 RX ADMIN — Medication 325 MILLIGRAM(S): at 12:02

## 2019-01-01 RX ADMIN — OXYCODONE HYDROCHLORIDE 5 MILLIGRAM(S): 5 TABLET ORAL at 00:58

## 2019-01-01 RX ADMIN — APIXABAN 5 MILLIGRAM(S): 2.5 TABLET, FILM COATED ORAL at 18:00

## 2019-01-01 RX ADMIN — OXYCODONE HYDROCHLORIDE 5 MILLIGRAM(S): 5 TABLET ORAL at 22:00

## 2019-01-01 RX ADMIN — Medication 1 TABLET(S): at 12:02

## 2019-01-01 RX ADMIN — LIDOCAINE 2 PATCH: 4 CREAM TOPICAL at 21:26

## 2019-01-01 RX ADMIN — LIDOCAINE 2 PATCH: 4 CREAM TOPICAL at 12:03

## 2019-01-01 RX ADMIN — AMLODIPINE BESYLATE 2.5 MILLIGRAM(S): 2.5 TABLET ORAL at 05:16

## 2019-01-01 RX ADMIN — Medication 0.25 MILLIGRAM(S): at 08:23

## 2019-01-01 RX ADMIN — Medication 100 MILLIGRAM(S): at 05:16

## 2019-01-01 RX ADMIN — SENNA PLUS 2 TABLET(S): 8.6 TABLET ORAL at 21:30

## 2019-01-01 NOTE — PROGRESS NOTE ADULT - SUBJECTIVE AND OBJECTIVE BOX
No overnight events.  Patient does state her left hip hurts more.  She worked hard in therapy.  She also had multiple IV attempts to the arms and has been in pain for that as well.   Examined site and appears WNL, CDI. No erythema.  	  REVIEW OF SYSTEMS  Constitutional - No fever,  No fatigue  Neurological - No headaches, No memory loss, +loss of strength, No tremors  Musculoskeletal - +joint pain, +joint swelling, +muscle pain  Psychiatric - No depression, No anxiety    VITALS  T(C): 36.3 (01-01-19 @ 09:22), Max: 37 (12-31-18 @ 20:24)  HR: 87 (01-01-19 @ 09:22) (83 - 92)  BP: 128/74 (01-01-19 @ 09:22) (105/58 - 129/69)  RR: 14 (01-01-19 @ 09:22) (14 - 14)  SpO2: 90% (01-01-19 @ 09:22) (90% - 94%)  Wt(kg): --    153 mg/dL (01-01-19 @ 07:32)      MEDICATIONS   acetaminophen   Tablet .. 650 milliGRAM(s) every 6 hours PRN  ALPRAZolam 0.25 milliGRAM(s) <User Schedule>  ALPRAZolam 0.25 milliGRAM(s) daily PRN  amLODIPine   Tablet 2.5 milliGRAM(s) daily  apixaban 5 milliGRAM(s) every 12 hours  ascorbic acid 500 milliGRAM(s) two times a day  atorvastatin 10 milliGRAM(s) at bedtime  benzocaine 15 mG/menthol 3.6 mG Lozenge 1 Lozenge every 4 hours PRN  dextrose 40% Gel 15 Gram(s) once PRN  dextrose 5%. 1000 milliLiter(s) <Continuous>  dextrose 50% Injectable 12.5 Gram(s) once  dextrose 50% Injectable 25 Gram(s) once  dextrose 50% Injectable 25 Gram(s) once  docusate sodium 100 milliGRAM(s) two times a day  ferrous    sulfate 325 milliGRAM(s) daily  glucagon  Injectable 1 milliGRAM(s) once PRN  insulin lispro (HumaLOG) corrective regimen sliding scale   before breakfast  lidocaine   Patch 2 Patch <User Schedule>  multivitamin 1 Tablet(s) daily  oxyCODONE    IR 5 milliGRAM(s) every 4 hours PRN  pantoprazole    Tablet 40 milliGRAM(s) before breakfast  polyethylene glycol 3350 17 Gram(s) daily PRN  senna 2 Tablet(s) at bedtime  sodium chloride 0.9%. 1000 milliLiter(s) <Continuous>      RECENT LABS/IMAGING  CBC Full  -  ( 31 Dec 2018 06:50 )  WBC Count : 5.9 K/uL  Hemoglobin : 8.3 g/dL  Hematocrit : 25.4 %  Platelet Count - Automated : 330 K/uL  Mean Cell Volume : 92.3 fl  Mean Cell Hemoglobin : 30.0 pg  Mean Cell Hemoglobin Concentration : 32.5 gm/dL  Auto Neutrophil # : x  Auto Lymphocyte # : x  Auto Monocyte # : x  Auto Eosinophil # : x  Auto Basophil # : x  Auto Neutrophil % : x  Auto Lymphocyte % : x  Auto Monocyte % : x  Auto Eosinophil % : x  Auto Basophil % : x    01-01    142  |  107  |  22  ----------------------------<  142<H>  3.6   |  24  |  1.33<H>    Ca    8.7      01 Jan 2019 06:15          -----  PHYSICAL EXAM	  Constitutional - NAD, Comfortable  Pulm - Breathing comfortably, No wheezing  Abd - Soft, NTND  Extremities - Mild BLE pitting edema, No calf tenderness  Neurologic Exam -                    Cognitive - Awake, Alert     Communication - Fluent     Motor -  left > right LE weakness related to pain     Sensory - Intact to LT  Psychiatric - Mood mildly anxious, Affect WNL    ASSESSMENT/PLAN  86y Female with functional deficits after left ELENITA for femoral fracture  Mood - Xanax  HTN - Norvasc  DM2 - ISS  CAD - Lipitor  GI PPX - Colace, Protonix, Miralax, Senna	  Pain - Tylenol PRN, Oxycodone, Lidoderms  DVT - Eliquis  AI - IVF  BLE Swelling - ACE wraps     Continue 3hrs a day of comprehensive rehab program.

## 2019-01-02 PROCEDURE — 99233 SBSQ HOSP IP/OBS HIGH 50: CPT

## 2019-01-02 PROCEDURE — 99231 SBSQ HOSP IP/OBS SF/LOW 25: CPT

## 2019-01-02 RX ADMIN — OXYCODONE HYDROCHLORIDE 5 MILLIGRAM(S): 5 TABLET ORAL at 06:41

## 2019-01-02 RX ADMIN — APIXABAN 5 MILLIGRAM(S): 2.5 TABLET, FILM COATED ORAL at 06:41

## 2019-01-02 RX ADMIN — Medication 100 MILLIGRAM(S): at 18:34

## 2019-01-02 RX ADMIN — LIDOCAINE 2 PATCH: 4 CREAM TOPICAL at 20:16

## 2019-01-02 RX ADMIN — Medication 100 MILLIGRAM(S): at 06:41

## 2019-01-02 RX ADMIN — Medication 1 TABLET(S): at 12:39

## 2019-01-02 RX ADMIN — Medication 650 MILLIGRAM(S): at 16:14

## 2019-01-02 RX ADMIN — Medication 650 MILLIGRAM(S): at 15:41

## 2019-01-02 RX ADMIN — AMLODIPINE BESYLATE 2.5 MILLIGRAM(S): 2.5 TABLET ORAL at 06:41

## 2019-01-02 RX ADMIN — Medication 650 MILLIGRAM(S): at 09:25

## 2019-01-02 RX ADMIN — LIDOCAINE 2 PATCH: 4 CREAM TOPICAL at 00:22

## 2019-01-02 RX ADMIN — ATORVASTATIN CALCIUM 10 MILLIGRAM(S): 80 TABLET, FILM COATED ORAL at 20:23

## 2019-01-02 RX ADMIN — Medication 325 MILLIGRAM(S): at 12:39

## 2019-01-02 RX ADMIN — OXYCODONE HYDROCHLORIDE 5 MILLIGRAM(S): 5 TABLET ORAL at 20:23

## 2019-01-02 RX ADMIN — Medication 500 MILLIGRAM(S): at 06:41

## 2019-01-02 RX ADMIN — Medication 650 MILLIGRAM(S): at 08:37

## 2019-01-02 RX ADMIN — PANTOPRAZOLE SODIUM 40 MILLIGRAM(S): 20 TABLET, DELAYED RELEASE ORAL at 06:41

## 2019-01-02 RX ADMIN — Medication 500 MILLIGRAM(S): at 18:34

## 2019-01-02 RX ADMIN — LIDOCAINE 2 PATCH: 4 CREAM TOPICAL at 07:19

## 2019-01-02 RX ADMIN — APIXABAN 5 MILLIGRAM(S): 2.5 TABLET, FILM COATED ORAL at 18:34

## 2019-01-02 RX ADMIN — OXYCODONE HYDROCHLORIDE 5 MILLIGRAM(S): 5 TABLET ORAL at 07:15

## 2019-01-02 RX ADMIN — Medication 0.25 MILLIGRAM(S): at 08:36

## 2019-01-02 RX ADMIN — SENNA PLUS 2 TABLET(S): 8.6 TABLET ORAL at 20:23

## 2019-01-02 RX ADMIN — LIDOCAINE 2 PATCH: 4 CREAM TOPICAL at 06:42

## 2019-01-02 NOTE — PROGRESS NOTE ADULT - SUBJECTIVE AND OBJECTIVE BOX
HISTORY OF PRESENT ILLNESS  Mrs. More is an 86 year old woman who fell on 12/13, noting left hip pain and presenting to [facility]. She was found to have a left hip fracture for which she underwent a hemiarthroplasty by Dr. Brenner. Post operatively, patient found to have bilat lower extremity DVTs and VQ scan Over the weekend, patient found to have bilat DVTs and VQ scan revealed intermediate probability of pulmonary embolus. Patient started on treatement dose Eliquis 10mg BID x7 days, then to decreased to 5mg BID there after.  Also s/p 1 unit PRBCs for anemia and Hen of 7.6. H/h improved and stable.   Patient was still requiring min to mod A for bed mobility and min A for transfers and ambulation with RW. Requiring max  assist for LE dressing.   Acute IPR program recommended. Patient cleared medically for admission to acute IPR at [facility] on 12/20/18      TODAY'S SUBJECTIVE & REVIEW OF SYMPTOMS:  Pt seen and examined.  Left hip pain improving and controlled on medications.      ROS:  No HA/  Denies CP/dyspnea  Denies abdominal pain/nausea      Vital Signs Last 24 Hrs  T(C): 36.8 (02 Jan 2019 08:08), Max: 36.8 (01 Jan 2019 20:25)  T(F): 98.2 (02 Jan 2019 08:08), Max: 98.2 (01 Jan 2019 20:25)  HR: 90 (02 Jan 2019 08:08) (84 - 90)  BP: 115/66 (02 Jan 2019 08:08) (115/66 - 131/73)  BP(mean): --  RR: 14 (02 Jan 2019 08:08) (14 - 14)  SpO2: 92% (02 Jan 2019 08:08) (92% - 96%)      Physical Exam: Mental Status - Patient is alert, awake, oriented X3.  	LUNGS:	Clear bilaterally	  	HEART:	 S1S2 	  	GI/ ABDOMEN:  Soft  Non tender +BS  	EXTREMITIES:   LLE edema +     Incision left hip with steri strips c/d/i  No erythema noted    FUNCTIONAL STATUS -   Bed mobility: Min A  Transfers: CS  Gait - Amb  RW CG/CS  ADL's - UE dress min A, LE dress mod/max A  Functional Transfers - Min assist to toilet this am with use of grab bars    RECENT LABS/IMAGING  LABS:                         8.3    5.9   )-----------( 330      ( 31 Dec 2018 06:50 )             25.4     31 Dec 2018 06:50    141    |  105    |  28     ----------------------------<  132    3.9     |  26     |  1.68     Ca    8.7        31 Dec 2018      MEDICATIONS  (STANDING):  ALPRAZolam 0.25 milliGRAM(s) Oral <User Schedule>  amLODIPine   Tablet 2.5 milliGRAM(s) Oral daily  apixaban 5 milliGRAM(s) Oral every 12 hours  ascorbic acid 500 milliGRAM(s) Oral two times a day  atorvastatin 10 milliGRAM(s) Oral at bedtime  dextrose 5%. 1000 milliLiter(s) (50 mL/Hr) IV Continuous <Continuous>  dextrose 50% Injectable 12.5 Gram(s) IV Push once  dextrose 50% Injectable 25 Gram(s) IV Push once  dextrose 50% Injectable 25 Gram(s) IV Push once  docusate sodium 100 milliGRAM(s) Oral two times a day  ferrous    sulfate 325 milliGRAM(s) Oral daily  insulin lispro (HumaLOG) corrective regimen sliding scale   SubCutaneous before breakfast  lidocaine   Patch 2 Patch Transdermal <User Schedule>  multivitamin 1 Tablet(s) Oral daily  pantoprazole    Tablet 40 milliGRAM(s) Oral before breakfast  senna 2 Tablet(s) Oral at bedtime  sodium chloride 0.9%. 1000 milliLiter(s) (75 mL/Hr) IV Continuous <Continuous>    MEDICATIONS  (PRN):  acetaminophen   Tablet .. 650 milliGRAM(s) Oral every 6 hours PRN Mild Pain (1 - 3)  ALPRAZolam 0.25 milliGRAM(s) Oral daily PRN anxiety  benzocaine 15 mG/menthol 3.6 mG Lozenge 1 Lozenge Oral every 4 hours PRN Sore Throat  dextrose 40% Gel 15 Gram(s) Oral once PRN Blood Glucose LESS THAN 70 milliGRAM(s)/deciliter  glucagon  Injectable 1 milliGRAM(s) IntraMuscular once PRN Glucose LESS THAN 70 milligrams/deciliter  oxyCODONE    IR 5 milliGRAM(s) Oral every 4 hours PRN Severe Pain (7 - 10)  polyethylene glycol 3350 17 Gram(s) Oral daily PRN Constipation        ASSESSMENT/PLAN    86 woman s/p left hip fracture treated with hemiarthroplasty on 12/13 with functional deficits consisting of gait and ADL impairments with decreased endurance and strength, and newly diagnosed bilat DVTs and PE.     1.  Femoral fracture.  Plan: s/p hemiarthroplasty, Continue PT/OT   MVI, zinc and vit c for healing, Oxycodone f2caadc prn, tylenol prn, ice packs prn, and lidocaine patches for pain management.       2.  DVT (deep venous thrombosis).  Plan: Eliquis 10mg q12 for total of 7 days (ended on 12/22), Started maintenance dose of 5mg q12  on 12/23      3. Pulmonary emboli.  Plan: Continue eliquis      4.  Type 2 diabetes mellitus without complication,: SSI.    5.  HTN (hypertension). :Norvasc: 2.5mg daily      6.  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       7.  Anemia due to blood loss: on iron supplements. HCT stable      d/c planning : home 1/4     discussed with niece at bedside

## 2019-01-02 NOTE — PROGRESS NOTE ADULT - SUBJECTIVE AND OBJECTIVE BOX
Patient is a 86y old  Female who presents with a chief complaint of fall resulting in left hip fx requiring hemiarthroplasty, functional deficits (01 Jan 2019 09:34)      Patient seen and examined at bedside.     ALLERGIES:  penicillin (Rash; Urticaria)    MEDICATIONS:  ALPRAZolam 0.25 milliGRAM(s) Oral <User Schedule>  ALPRAZolam 0.25 milliGRAM(s) Oral daily PRN  amLODIPine   Tablet 2.5 milliGRAM(s) Oral daily  ascorbic acid 500 milliGRAM(s) Oral two times a day  benzocaine 15 mG/menthol 3.6 mG Lozenge 1 Lozenge Oral every 4 hours PRN  docusate sodium 100 milliGRAM(s) Oral two times a day  ferrous    sulfate 325 milliGRAM(s) Oral daily  insulin lispro (HumaLOG) corrective regimen sliding scale   SubCutaneous before breakfast  multivitamin 1 Tablet(s) Oral daily  oxyCODONE    IR 5 milliGRAM(s) Oral every 4 hours PRN  sodium chloride 0.9%. 1000 milliLiter(s) IV Continuous <Continuous>    Vital Signs Last 24 Hrs  T(F): 98.2 (02 Jan 2019 08:08), Max: 98.2 (01 Jan 2019 20:25)  HR: 90 (02 Jan 2019 08:08) (84 - 90)  BP: 115/66 (02 Jan 2019 08:08) (115/66 - 131/73)  RR: 14 (02 Jan 2019 08:08) (14 - 14)  SpO2: 92% (02 Jan 2019 08:08) (92% - 96%)  I&O's Summary    01 Jan 2019 07:01  -  02 Jan 2019 07:00  --------------------------------------------------------  IN: 150 mL / OUT: 0 mL / NET: 150 mL        PHYSICAL EXAM:  General: NAD, comfortable   ENT: MMM  Neck: Supple, No JVD  Lungs: CTA, BLAE, No added sounds.   Cardio: RRR, S1/S2, No murmurs  Abdomen: Soft, NT/ND, BS+   Extremities: No edema, Left hip incision healing well, stitches removed.   Psych: A/O x 3     LABS:                        8.3    5.9   )-----------( 330      ( 31 Dec 2018 06:50 )             25.4     01-01    142  |  107  |  22  ----------------------------<  142  3.6   |  24  |  1.33    Ca    8.7      01 Jan 2019 06:15      eGFR if African American: 42 mL/min/1.73M2 (01-01-19 @ 06:15)  eGFR if Non African American: 36 mL/min/1.73M2 (01-01-19 @ 06:15)                    CAPILLARY BLOOD GLUCOSE      POCT Blood Glucose.: 150 mg/dL (02 Jan 2019 08:17)    12-15 [id number] 6.1  12-14 [id number] 6.0          RADIOLOGY & ADDITIONAL TESTS:    Care Discussed with Consultants/Other Providers:

## 2019-01-02 NOTE — PROGRESS NOTE ADULT - ASSESSMENT
86 year old female from home PMH HTN, DMII, HLD, presented with fall admitted for acute left hip fracture.    #Acute left hip fracture: s/p hemiarthroplasty   c/w PT/OT as tolerated .  Pain management       #Pulmonary emboli & DVT : Stable.   C/w Eliquis,   Oxygen NC prn.     #CKD III: improving  Cr is 1.33,    encourage oral hydration,   Avoid nephrotoxic meds.       #DM Type II: HcA1C - 6.1%,   c/w Insulin sliding scale.   FS at goal.,     #HLD:   c/w Lipitor.    #HTN: controlled.   c/w  Norvasc     #Anemia   c/w Ferrous     DVT PPX: on Eliquis

## 2019-01-03 ENCOUNTER — TRANSCRIPTION ENCOUNTER (OUTPATIENT)
Age: 84
End: 2019-01-03

## 2019-01-03 PROCEDURE — 99231 SBSQ HOSP IP/OBS SF/LOW 25: CPT

## 2019-01-03 PROCEDURE — 99233 SBSQ HOSP IP/OBS HIGH 50: CPT

## 2019-01-03 RX ORDER — VALSARTAN 80 MG/1
1 TABLET ORAL
Qty: 0 | Refills: 0 | COMMUNITY

## 2019-01-03 RX ORDER — ALPRAZOLAM 0.25 MG
1 TABLET ORAL
Qty: 7 | Refills: 0 | OUTPATIENT
Start: 2019-01-03 | End: 2019-01-09

## 2019-01-03 RX ORDER — ALPRAZOLAM 0.25 MG
0 TABLET ORAL
Qty: 0 | Refills: 0 | COMMUNITY

## 2019-01-03 RX ORDER — AMLODIPINE BESYLATE 2.5 MG/1
1 TABLET ORAL
Qty: 0 | Refills: 0 | COMMUNITY

## 2019-01-03 RX ORDER — ATORVASTATIN CALCIUM 80 MG/1
1 TABLET, FILM COATED ORAL
Qty: 30 | Refills: 0 | OUTPATIENT
Start: 2019-01-03 | End: 2019-02-01

## 2019-01-03 RX ORDER — METFORMIN HYDROCHLORIDE 850 MG/1
1 TABLET ORAL
Qty: 0 | Refills: 0 | COMMUNITY

## 2019-01-03 RX ORDER — APIXABAN 2.5 MG/1
1 TABLET, FILM COATED ORAL
Qty: 60 | Refills: 0 | OUTPATIENT
Start: 2019-01-03 | End: 2019-02-01

## 2019-01-03 RX ORDER — OXYCODONE HYDROCHLORIDE 5 MG/1
1 TABLET ORAL
Qty: 24 | Refills: 0 | OUTPATIENT
Start: 2019-01-03

## 2019-01-03 RX ORDER — LOVASTATIN 20 MG
1 TABLET ORAL
Qty: 0 | Refills: 0 | COMMUNITY

## 2019-01-03 RX ORDER — AMLODIPINE BESYLATE 2.5 MG/1
1 TABLET ORAL
Qty: 30 | Refills: 0 | OUTPATIENT
Start: 2019-01-03 | End: 2019-02-01

## 2019-01-03 RX ORDER — POLYETHYLENE GLYCOL 3350 17 G/17G
17 POWDER, FOR SOLUTION ORAL
Qty: 0 | Refills: 0 | COMMUNITY
Start: 2019-01-03

## 2019-01-03 RX ORDER — PANTOPRAZOLE SODIUM 20 MG/1
1 TABLET, DELAYED RELEASE ORAL
Qty: 0 | Refills: 0 | COMMUNITY

## 2019-01-03 RX ORDER — METFORMIN HYDROCHLORIDE 850 MG/1
1 TABLET ORAL
Qty: 30 | Refills: 0 | OUTPATIENT
Start: 2019-01-03 | End: 2019-02-01

## 2019-01-03 RX ADMIN — Medication 650 MILLIGRAM(S): at 07:48

## 2019-01-03 RX ADMIN — PANTOPRAZOLE SODIUM 40 MILLIGRAM(S): 20 TABLET, DELAYED RELEASE ORAL at 05:57

## 2019-01-03 RX ADMIN — APIXABAN 5 MILLIGRAM(S): 2.5 TABLET, FILM COATED ORAL at 17:04

## 2019-01-03 RX ADMIN — OXYCODONE HYDROCHLORIDE 5 MILLIGRAM(S): 5 TABLET ORAL at 22:00

## 2019-01-03 RX ADMIN — SENNA PLUS 2 TABLET(S): 8.6 TABLET ORAL at 21:20

## 2019-01-03 RX ADMIN — Medication 100 MILLIGRAM(S): at 05:57

## 2019-01-03 RX ADMIN — Medication 1 TABLET(S): at 12:46

## 2019-01-03 RX ADMIN — Medication 650 MILLIGRAM(S): at 07:37

## 2019-01-03 RX ADMIN — AMLODIPINE BESYLATE 2.5 MILLIGRAM(S): 2.5 TABLET ORAL at 05:57

## 2019-01-03 RX ADMIN — OXYCODONE HYDROCHLORIDE 5 MILLIGRAM(S): 5 TABLET ORAL at 21:20

## 2019-01-03 RX ADMIN — Medication 100 MILLIGRAM(S): at 17:03

## 2019-01-03 RX ADMIN — Medication 0.25 MILLIGRAM(S): at 07:38

## 2019-01-03 RX ADMIN — ATORVASTATIN CALCIUM 10 MILLIGRAM(S): 80 TABLET, FILM COATED ORAL at 21:20

## 2019-01-03 RX ADMIN — Medication 650 MILLIGRAM(S): at 12:47

## 2019-01-03 RX ADMIN — Medication 1: at 07:48

## 2019-01-03 RX ADMIN — APIXABAN 5 MILLIGRAM(S): 2.5 TABLET, FILM COATED ORAL at 05:57

## 2019-01-03 RX ADMIN — Medication 500 MILLIGRAM(S): at 05:57

## 2019-01-03 RX ADMIN — Medication 325 MILLIGRAM(S): at 12:47

## 2019-01-03 RX ADMIN — Medication 500 MILLIGRAM(S): at 17:03

## 2019-01-03 NOTE — PROGRESS NOTE ADULT - SUBJECTIVE AND OBJECTIVE BOX
Patient is a 86y old  Female who presents with a chief complaint of fall resulting in left hip fx requiring hemiarthroplasty, functional deficits (02 Jan 2019 14:56)      Patient seen and examined at bedside. NO new complaints.    ALLERGIES:  penicillin (Rash; Urticaria)    MEDICATIONS:  ALPRAZolam 0.25 milliGRAM(s) Oral <User Schedule>  ALPRAZolam 0.25 milliGRAM(s) Oral daily PRN  amLODIPine   Tablet 2.5 milliGRAM(s) Oral daily  ascorbic acid 500 milliGRAM(s) Oral two times a day  benzocaine 15 mG/menthol 3.6 mG Lozenge 1 Lozenge Oral every 4 hours PRN  docusate sodium 100 milliGRAM(s) Oral two times a day  ferrous    sulfate 325 milliGRAM(s) Oral daily  insulin lispro (HumaLOG) corrective regimen sliding scale   SubCutaneous before breakfast  multivitamin 1 Tablet(s) Oral daily  oxyCODONE    IR 5 milliGRAM(s) Oral every 4 hours PRN  sodium chloride 0.9%. 1000 milliLiter(s) IV Continuous <Continuous>    Vital Signs Last 24 Hrs  T(F): 97.4 (03 Jan 2019 09:03), Max: 98.1 (02 Jan 2019 20:35)  HR: 88 (03 Jan 2019 09:03) (88 - 91)  BP: 137/69 (03 Jan 2019 09:03) (130/69 - 137/69)  RR: 14 (03 Jan 2019 09:03) (14 - 14)  SpO2: 98% (03 Jan 2019 09:03) (91% - 98%)  I&O's Summary      PHYSICAL EXAM:  General: NAD, comfortable   ENT: MMM  Neck: Supple, No JVD  Lungs: CTA, BLAE, No added sounds.   Cardio: RRR, S1/S2, No murmurs  Abdomen: Soft, NT/ND, BS+   Extremities: LLE edema +   Incision left hip, site clean   Psych: A/O x 3    LABS:    01-01    142  |  107  |  22  ----------------------------<  142  3.6   |  24  |  1.33    Ca    8.7      01 Jan 2019 06:15      eGFR if African American: 42 mL/min/1.73M2 (01-01-19 @ 06:15)  eGFR if Non African American: 36 mL/min/1.73M2 (01-01-19 @ 06:15)                    CAPILLARY BLOOD GLUCOSE      POCT Blood Glucose.: 156 mg/dL (03 Jan 2019 07:44)    12-15 IbdpopoxuvP0L 6.1  12-14 FuruodsdvpV1F 6.0          RADIOLOGY & ADDITIONAL TESTS:    Care Discussed with Consultants/Other Providers:

## 2019-01-03 NOTE — DISCHARGE NOTE ADULT - MEDICATION SUMMARY - MEDICATIONS TO STOP TAKING
I will STOP taking the medications listed below when I get home from the hospital:    lovastatin 20 mg oral tablet  -- 1 tab(s) by mouth once a day (at bedtime)    Protonix 40 mg oral delayed release tablet  -- 1 tab(s) by mouth once a day    valsartan 160 mg oral tablet  -- 1 tab(s) by mouth once a day    apixaban 5 mg oral tablet  -- 2 tab(s) by mouth every 12 hours

## 2019-01-03 NOTE — DISCHARGE NOTE ADULT - OTHER SIGNIFICANT FINDINGS
< from: NM Pulmonary Ventilation/Perfusion Scan (12.17.18 @ 12:28) >  FINDINGS: There is a large mismatched ventilation/perfusion defect in the   anterior segment of the right upper lobe and a moderate-sized mismatched   ventilation/perfusion defect in the posterior segment of the right upper   lobe. There are no corresponding chest x-ray opacities.These   abnormalities are new since the previous study of 11/23/2016. There is   heterogeneous distribution of radiopharmaceutical in the remainder of   both lungs on the ventilation and perfusion images.    IMPRESSION: Abnormal ventilation/perfusionlung scan. Intermediate   probability of pulmonary embolus.    < end of copied text >    < from: Xray Chest 2 Views PA/Lat (12.17.18 @ 10:07) >  There is development of mild central edema since the prior exam with a   reduced inspiratory result. There is no lobar consolidation or layering   effusion. A rim calcified mediastinal cyst is noted as described on   previous CT. The heart is normal in size.    < end of copied text >    < from: US Duplex Venous Lower Ext Complete, Bilateral (12.15.18 @ 15:56) >  There is evidence of acute thrombosis involving the distal common   femoral, popliteal and posterior tibial veins on the right side.Common   femoral vein thrombus extends into the bifurcation and profunda femoris.    Evaluation of the left lower extremity demonstrates evidence of acute   thrombosis involving the popliteal vein that extends into the posterior   tibial and peronealveins.    IMPRESSION: Critical finding.    Bilateral acute deep venous thrombosis.    < end of copied text >

## 2019-01-03 NOTE — DISCHARGE NOTE ADULT - CARE PLAN
Principal Discharge DX:	Closed fracture of left hip, initial encounter  Goal:	resume full function as prior to event  Assessment and plan of treatment:	Make and appointment and follow up with Dr. Brenner within 1-2 weeks.  Your staples were removed 12/28/18.   continue home PT/OT and follow up with Dr. Pollard for ongoing rehab needs as outpatient.  Secondary Diagnosis:	Anemia due to blood loss  Goal:	improved  Assessment and plan of treatment:	post op stable now.  Follow up with Dr. Tellez to monitor your hemoglobin level - CBC  Secondary Diagnosis:	Deep vein thrombosis (DVT) of both lower extremities, unspecified chronicity, unspecified vein  Goal:	Prevent venous clot  Assessment and plan of treatment:	You were found to have deep venous thromboembolism (blood clots) in your legs. Also found to have blood clot in your lungs - Pulmonary Embolism.  Continue Eliquis as prescribed   Follow up with Dr. Tellez within 1-2 weeks.  Secondary Diagnosis:	Essential hypertension  Assessment and plan of treatment:	continue norvasc. Follow up with your PMD Dr. Tellez  Secondary Diagnosis:	Mediastinal mass  Goal:	follow up as outpatient  Assessment and plan of treatment:	Follow up with Pulmonology Dr. Nelson as outpatient.  Call to make appointment.  Secondary Diagnosis:	Pure hypercholesterolemia  Assessment and plan of treatment:	stable continue Statin  Secondary Diagnosis:	Stage 4 chronic kidney disease  Assessment and plan of treatment:	stable. Follow up with your PMD and kidney doctor

## 2019-01-03 NOTE — DISCHARGE NOTE ADULT - MEDICATION SUMMARY - MEDICATIONS TO TAKE
I will START or STAY ON the medications listed below when I get home from the hospital:    oxyCODONE 5 mg oral tablet  -- 1 tab(s) by mouth every 6 hours, As Needed -Severe Pain (7 - 10) MDD:20 mg  -- Indication: For Pain    apixaban 5 mg oral tablet  -- 1 tab(s) by mouth every 12 hours  -- Indication: For Treatment of Pulmonary Embolism    metFORMIN 500 mg oral tablet, extended release  -- 1 tab(s) by mouth once a day  -- Indication: For Diabetes    atorvastatin 10 mg oral tablet  -- 1 tab(s) by mouth once a day (at bedtime)  -- Indication: For Hypercholesterolemia    ALPRAZolam 0.25 mg oral tablet  -- 1 tab(s) by mouth once a day, As needed, anxiety MDD:0.25 mg  -- Indication: For Anxiety    Norvasc 5 mg oral tablet  -- 1 tab(s) by mouth once a day  -- Indication: For HTN (hypertension)    polyethylene glycol 3350 oral powder for reconstitution  -- 17 gram(s) by mouth once a day, As needed, Constipation  -- Indication: For Constipation    Multiple Vitamins oral tablet  -- 1 tab(s) by mouth once a day  -- Indication: For Nutrition

## 2019-01-03 NOTE — DISCHARGE NOTE ADULT - NSTOBACCOHOTLINE_GEN_A_NCS
[facility] Smokers Quitline ([phone]) [facility] Smokers Quitline ([phone])

## 2019-01-03 NOTE — DISCHARGE NOTE ADULT - PATIENT PORTAL LINK FT
You can access the [facility] Patient Portal, offered by [facility], by registering with the following website: [email or web address]

## 2019-01-03 NOTE — DISCHARGE NOTE ADULT - HOSPITAL COURSE
Mrs. More is an 86 year old woman who fell on 12/13, noting left hip pain and presenting to [facility]. She was found to have a left hip fracture for which she underwent a hemiarthroplasty by Dr. Brenner. Post operatively, patient found to have bilat lower extremity DVTs and VQ scan Over the weekend, patient found to have bilat DVTs and VQ scan revealed intermediate probability of pulmonary embolus. Patient started on treatement dose Eliquis 10mg BID x7 days, then to decreased to 5mg BID there after.  Also s/p 1 unit PRBCs for anemia and Hen of 7.6. H/h improved and stable. Patient still requiring min to mod A for bed mobility and min A for transfers and ambulation with RW. Requiring max  assist for LE dressing. Acute IPR program recommended. Patient cleared medically for admission to acute IPR here at [facility] on 12/20/18.  Vascular surgery consulted and recommended continuing Eliquis 5 mg BID for at least 6 months and possibly indefinitely.  Course complicated by elevated BUN/Cr which improved after IV fluids.  Incidentally found to have anterior mediastinal rim enhancing mass: appears stable from 2016, consulted Puls[facility] Cord[facility], needs outpt f/u at discharge, not causing any acute issues currently. Patient completed course of inpatient rehab with functional improvement and is stable for discharge home with home therapy and plan for close outpatient follow up.

## 2019-01-03 NOTE — DISCHARGE NOTE ADULT - CARE PROVIDERS DIRECT ADDRESSES
[email or web address]

## 2019-01-03 NOTE — DISCHARGE NOTE ADULT - CARE PROVIDER_API CALL
Samy Tellez), Medicine  [address]  [address]  [place], [place] [address]  Phone: [phone]  Fax: [phone]    Salinas Cordoba), Critical Care Medicine  [address] [place] [address]  [address]  [place] [address]  Phone: [phone]  Fax: [phone]    Liya Pollard), PhysicalRehab Medicine  [address] [place] [address]  Phone: [phone]  Fax: [phone]    Fredy Brenner), Orthopaedic Sports Medicine; Orthopaedic Surgery  [address] [facility]  [address]  [place] [address]  Phone: [phone]  Fax: [phone]

## 2019-01-03 NOTE — DISCHARGE NOTE ADULT - PLAN OF CARE
resume full function as prior to event Make and appointment and follow up with Dr. Brenner within 1-2 weeks.  Your staples were removed 12/28/18.   continue home PT/OT and follow up with Dr. Pollard for ongoing rehab needs as outpatient. improved post op stable now.  Follow up with Dr. Gomez to monitor your hemoglobin level - CBC Prevent venous clot You were found to have deep venous thromboembolism (blood clots) in your legs. Also found to have blood clot in your lungs - Pulmonary Embolism.  Continue Eliquis as prescribed   Follow up with Dr. Gomez within 1-2 weeks. [facility] Follow up with your PMD Dr. Tellez follow up as outpatient Follow up with Pulmonology Dr. Nelson as outpatient.  Call to make appointment. stable continue Statin stable. Follow up with your PMD and kidney doctor

## 2019-01-03 NOTE — DISCHARGE NOTE ADULT - SECONDARY DIAGNOSIS.
Anemia due to blood loss Deep vein thrombosis (DVT) of both lower extremities, unspecified chronicity, unspecified vein Essential hypertension Mediastinal mass Pure hypercholesterolemia Stage 4 chronic kidney disease

## 2019-01-03 NOTE — PROGRESS NOTE ADULT - SUBJECTIVE AND OBJECTIVE BOX
HISTORY OF PRESENT ILLNESS  Mrs. More is an 86 year old woman who fell on 12/13, noting left hip pain and presenting to [facility]. She was found to have a left hip fracture for which she underwent a hemiarthroplasty by Dr. Brenner. Post operatively, patient found to have bilat lower extremity DVTs and VQ scan Over the weekend, patient found to have bilat DVTs and VQ scan revealed intermediate probability of pulmonary embolus. Patient started on treatement dose Eliquis 10mg BID x7 days, then to decreased to 5mg BID there after.  Also s/p 1 unit PRBCs for anemia and Hen of 7.6. H/h improved and stable.   Patient was still requiring min to mod A for bed mobility and min A for transfers and ambulation with RW. Requiring max  assist for LE dressing.   Acute IPR program recommended. Patient cleared medically for admission to acute IPR at [facility] on 12/20/18      TODAY'S SUBJECTIVE & REVIEW OF SYMPTOMS:  Pt seen and examined with niece present. slept okay overnight. Left hip pain improving and controlled on medications, primary taking Tylenol.  Feels confident for discharge home tomorrow.  Offers no new complaints.       ROS:  No HA/  Denies CP/dyspnea  Denies abdominal pain/nausea      ICU Vital Signs Last 24 Hrs  T(C): 36.3 (03 Jan 2019 09:03), Max: 36.7 (02 Jan 2019 20:35)  T(F): 97.4 (03 Jan 2019 09:03), Max: 98.1 (02 Jan 2019 20:35)  HR: 88 (03 Jan 2019 09:03) (88 - 91)  BP: 137/69 (03 Jan 2019 09:03) (130/69 - 137/69)  BP(mean): --  ABP: --  ABP(mean): --  RR: 14 (03 Jan 2019 09:03) (14 - 14)  SpO2: 98% (03 Jan 2019 09:03) (91% - 98%)        Physical Exam: Mental Status - Patient is alert, awake, oriented X3.  	LUNGS:	Clear bilaterally	  	HEART:	 S1S2 	  	GI/ ABDOMEN:  Soft  Non tender +BS  	EXTREMITIES:   LLE edema +     Incision left hip with steri strips c/d/i  No erythema noted    FUNCTIONAL STATUS -   Bed mobility: Min A  Transfers: CS  Gait - Amb  RW CG/CS  ADL's - UE dress min A, LE dress mod/max A  Functional Transfers - Min assist to toilet this am with use of grab bars    RECENT LABS/IMAGING  LABS:                         8.3    5.9   )-----------( 330      ( 31 Dec 2018 06:50 )             25.4     31 Dec 2018 06:50    141    |  105    |  28     ----------------------------<  132    3.9     |  26     |  1.68     Ca    8.7        31 Dec 2018    CAPILLARY BLOOD GLUCOSE  POCT Blood Glucose.: 156 mg/dL (03 Jan 2019 07:44)          MEDICATIONS  (STANDING):  ALPRAZolam 0.25 milliGRAM(s) Oral <User Schedule>  amLODIPine   Tablet 2.5 milliGRAM(s) Oral daily  apixaban 5 milliGRAM(s) Oral every 12 hours  ascorbic acid 500 milliGRAM(s) Oral two times a day  atorvastatin 10 milliGRAM(s) Oral at bedtime  dextrose 5%. 1000 milliLiter(s) (50 mL/Hr) IV Continuous <Continuous>  dextrose 50% Injectable 12.5 Gram(s) IV Push once  dextrose 50% Injectable 25 Gram(s) IV Push once  dextrose 50% Injectable 25 Gram(s) IV Push once  docusate sodium 100 milliGRAM(s) Oral two times a day  ferrous    sulfate 325 milliGRAM(s) Oral daily  insulin lispro (HumaLOG) corrective regimen sliding scale   SubCutaneous before breakfast  lidocaine   Patch 2 Patch Transdermal <User Schedule>  multivitamin 1 Tablet(s) Oral daily  pantoprazole    Tablet 40 milliGRAM(s) Oral before breakfast  senna 2 Tablet(s) Oral at bedtime  sodium chloride 0.9%. 1000 milliLiter(s) (75 mL/Hr) IV Continuous <Continuous>    MEDICATIONS  (PRN):  acetaminophen   Tablet .. 650 milliGRAM(s) Oral every 6 hours PRN Mild Pain (1 - 3)  ALPRAZolam 0.25 milliGRAM(s) Oral daily PRN anxiety  benzocaine 15 mG/menthol 3.6 mG Lozenge 1 Lozenge Oral every 4 hours PRN Sore Throat  dextrose 40% Gel 15 Gram(s) Oral once PRN Blood Glucose LESS THAN 70 milliGRAM(s)/deciliter  glucagon  Injectable 1 milliGRAM(s) IntraMuscular once PRN Glucose LESS THAN 70 milligrams/deciliter  oxyCODONE    IR 5 milliGRAM(s) Oral every 4 hours PRN Severe Pain (7 - 10)  polyethylene glycol 3350 17 Gram(s) Oral daily PRN Constipation          ASSESSMENT/PLAN    86 woman s/p left hip fracture treated with hemiarthroplasty on 12/13 with functional deficits consisting of gait and ADL impairments with decreased endurance and strength, and newly diagnosed bilat DVTs and PE.     1.  Femoral fracture.  Plan: s/p hemiarthroplasty, Continue PT/OT   MVI, zinc and vit c for healing, Oxycodone [id number] prn, tylenol prn, ice packs prn, and lidocaine patches for pain management.       2.  DVT (deep venous thrombosis).  Plan: Eliquis 10mg q12 for total of 7 days (ended on 12/22), Started maintenance dose of 5mg q12  on 12/23 and will need this indefinitely       3. Pulmonary emboli.  Plan: Continue eliquis      4.  Type 2 diabetes mellitus without complication,: SSI. Will resume Metformin as outpatient (kidney function improved) and to follow up with PCP Dr. Gomez    5.  HTN (hypertension). :Norvasc: 2.5mg daily      6.  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       7.  Anemia due to blood loss: on iron supplements. HCT stable      d/c planning : home 1/4     discussed with niece at bedside

## 2019-01-03 NOTE — PROGRESS NOTE ADULT - ASSESSMENT
86 year old female from home PMH HTN, DMII, HLD, presented with fall admitted for acute left hip fracture.    #Acute left hip fracture: s/p hemiarthroplasty   c/w PT/OT as tolerated .  Pain management       #Pulmonary emboli   # DVT : Stable.   C/w Eliquis,   Oxygen NC prn.     #CKD III: improving  Cr is 1.33,    encourage oral hydration,   Avoid nephrotoxic meds.     #DM Type II: HcA1C - 6.1%,   c/w Insulin sliding scale.   AM glucose not at goal, consider adding basal insulin.     #HLD:   c/w Lipitor.    #HTN: at goal .  c/w  Norvasc     #Anemia   c/w Ferrous     DVT PPX: on Eliquis

## 2019-01-04 VITALS
SYSTOLIC BLOOD PRESSURE: 120 MMHG | RESPIRATION RATE: 14 BRPM | OXYGEN SATURATION: 91 % | HEART RATE: 86 BPM | TEMPERATURE: 98 F | DIASTOLIC BLOOD PRESSURE: 57 MMHG

## 2019-01-04 PROCEDURE — 93005 ELECTROCARDIOGRAM TRACING: CPT

## 2019-01-04 PROCEDURE — 97116 GAIT TRAINING THERAPY: CPT

## 2019-01-04 PROCEDURE — 82962 GLUCOSE BLOOD TEST: CPT

## 2019-01-04 PROCEDURE — 93010 ELECTROCARDIOGRAM REPORT: CPT

## 2019-01-04 PROCEDURE — 97535 SELF CARE MNGMENT TRAINING: CPT

## 2019-01-04 PROCEDURE — 99238 HOSP IP/OBS DSCHRG MGMT 30/<: CPT

## 2019-01-04 PROCEDURE — 97167 OT EVAL HIGH COMPLEX 60 MIN: CPT

## 2019-01-04 PROCEDURE — 97110 THERAPEUTIC EXERCISES: CPT

## 2019-01-04 PROCEDURE — 85027 COMPLETE CBC AUTOMATED: CPT

## 2019-01-04 PROCEDURE — 80053 COMPREHEN METABOLIC PANEL: CPT

## 2019-01-04 PROCEDURE — 80048 BASIC METABOLIC PNL TOTAL CA: CPT

## 2019-01-04 PROCEDURE — 97530 THERAPEUTIC ACTIVITIES: CPT

## 2019-01-04 PROCEDURE — 97163 PT EVAL HIGH COMPLEX 45 MIN: CPT

## 2019-01-04 PROCEDURE — 99233 SBSQ HOSP IP/OBS HIGH 50: CPT

## 2019-01-04 RX ORDER — AMLODIPINE BESYLATE 2.5 MG/1
1 TABLET ORAL
Qty: 30 | Refills: 0 | OUTPATIENT
Start: 2019-01-04 | End: 2019-02-02

## 2019-01-04 RX ORDER — ALPRAZOLAM 0.25 MG
1 TABLET ORAL
Qty: 7 | Refills: 0 | OUTPATIENT
Start: 2019-01-04 | End: 2019-01-10

## 2019-01-04 RX ORDER — APIXABAN 2.5 MG/1
1 TABLET, FILM COATED ORAL
Qty: 60 | Refills: 0 | OUTPATIENT
Start: 2019-01-04 | End: 2019-02-02

## 2019-01-04 RX ORDER — METFORMIN HYDROCHLORIDE 850 MG/1
1 TABLET ORAL
Qty: 30 | Refills: 0 | OUTPATIENT
Start: 2019-01-04 | End: 2019-02-02

## 2019-01-04 RX ORDER — OXYCODONE HYDROCHLORIDE 5 MG/1
1 TABLET ORAL
Qty: 28 | Refills: 0 | OUTPATIENT
Start: 2019-01-04 | End: 2019-01-10

## 2019-01-04 RX ORDER — OXYCODONE HYDROCHLORIDE 5 MG/1
1 TABLET ORAL
Qty: 14 | Refills: 0 | OUTPATIENT
Start: 2019-01-04 | End: 2019-01-10

## 2019-01-04 RX ADMIN — LIDOCAINE 2 PATCH: 4 CREAM TOPICAL at 07:47

## 2019-01-04 RX ADMIN — Medication 0.25 MILLIGRAM(S): at 07:51

## 2019-01-04 RX ADMIN — Medication 0.25 MILLIGRAM(S): at 12:55

## 2019-01-04 RX ADMIN — APIXABAN 5 MILLIGRAM(S): 2.5 TABLET, FILM COATED ORAL at 05:55

## 2019-01-04 RX ADMIN — Medication 100 MILLIGRAM(S): at 05:55

## 2019-01-04 RX ADMIN — Medication 500 MILLIGRAM(S): at 05:55

## 2019-01-04 RX ADMIN — Medication 650 MILLIGRAM(S): at 07:51

## 2019-01-04 RX ADMIN — Medication 650 MILLIGRAM(S): at 12:38

## 2019-01-04 RX ADMIN — PANTOPRAZOLE SODIUM 40 MILLIGRAM(S): 20 TABLET, DELAYED RELEASE ORAL at 05:55

## 2019-01-04 RX ADMIN — AMLODIPINE BESYLATE 2.5 MILLIGRAM(S): 2.5 TABLET ORAL at 05:55

## 2019-01-04 RX ADMIN — Medication 650 MILLIGRAM(S): at 12:26

## 2019-01-04 RX ADMIN — Medication 650 MILLIGRAM(S): at 07:53

## 2019-01-04 RX ADMIN — Medication 1 TABLET(S): at 11:34

## 2019-01-04 RX ADMIN — Medication 325 MILLIGRAM(S): at 11:34

## 2019-01-04 NOTE — PROGRESS NOTE ADULT - SUBJECTIVE AND OBJECTIVE BOX
HISTORY OF PRESENT ILLNESS  Mrs. More is an 86 year old woman who fell on 12/13, noting left hip pain and presenting to [facility]. She was found to have a left hip fracture for which she underwent a hemiarthroplasty by Dr. Brenner. Post operatively, patient found to have bilat lower extremity DVTs and VQ scan Over the weekend, patient found to have bilat DVTs and VQ scan revealed intermediate probability of pulmonary embolus. Patient started on treatement dose Eliquis 10mg BID x7 days, then to decreased to 5mg BID there after.  Also s/p 1 unit PRBCs for anemia and Hen of 7.6. H/h improved and stable.   Patient was still requiring min to mod A for bed mobility and min A for transfers and ambulation with RW. Requiring max  assist for LE dressing.   Acute IPR program recommended. Patient cleared medically for admission to acute IPR at [facility] on 12/20/18      TODAY'S SUBJECTIVE & REVIEW OF SYMPTOMS:  Pt seen and examined bedside. Had some anxiety this morning which improved after taking medication. Feeling good about going home today. Offers no new complaints.       ROS:  No HA/  Denies CP/dyspnea  Denies abdominal pain/nausea      Vital Signs Last 24 Hrs  T(C): 36.7 (04 Jan 2019 08:16), Max: 36.9 (03 Jan 2019 20:35)  T(F): 98 (04 Jan 2019 08:16), Max: 98.5 (03 Jan 2019 20:35)  HR: 86 (04 Jan 2019 08:16) (86 - 88)  BP: 120/57 (04 Jan 2019 08:16) (112/63 - 120/57)  BP(mean): --  RR: 14 (04 Jan 2019 08:16) (14 - 14)  SpO2: 91% (04 Jan 2019 08:16) (91% - 96%)        Physical Exam: Mental Status - Patient is alert, awake, oriented X3.  	LUNGS:	Clear bilaterally	  	HEART:	 S1S2 	  	GI/ ABDOMEN:  Soft  Non tender +BS  	EXTREMITIES:   LLE edema +     Incision left hip with steri strips c/d/i  No erythema noted    FUNCTIONAL STATUS -   Bed mobility: Min A  Transfers: CS  Gait - Amb  RW CG/CS  ADL's - UE dress min A, LE dress mod/max A  Functional Transfers - Min assist to toilet this am with use of grab bars    RECENT LABS/IMAGING  LABS:                         8.3    5.9   )-----------( 330      ( 31 Dec 2018 06:50 )             25.4     31 Dec 2018 06:50    141    |  105    |  28     ----------------------------<  132    3.9     |  26     |  1.68     Ca    8.7        31 Dec 2018    CAPILLARY BLOOD GLUCOSE    POCT Blood Glucose.: 145 mg/dL (04 Jan 2019 07:46)            MEDICATIONS  (STANDING):  ALPRAZolam 0.25 milliGRAM(s) Oral <User Schedule>  amLODIPine   Tablet 2.5 milliGRAM(s) Oral daily  apixaban 5 milliGRAM(s) Oral every 12 hours  ascorbic acid 500 milliGRAM(s) Oral two times a day  atorvastatin 10 milliGRAM(s) Oral at bedtime  dextrose 5%. 1000 milliLiter(s) (50 mL/Hr) IV Continuous <Continuous>  dextrose 50% Injectable 12.5 Gram(s) IV Push once  dextrose 50% Injectable 25 Gram(s) IV Push once  dextrose 50% Injectable 25 Gram(s) IV Push once  docusate sodium 100 milliGRAM(s) Oral two times a day  ferrous    sulfate 325 milliGRAM(s) Oral daily  insulin lispro (HumaLOG) corrective regimen sliding scale   SubCutaneous before breakfast  lidocaine   Patch 2 Patch Transdermal <User Schedule>  multivitamin 1 Tablet(s) Oral daily  pantoprazole    Tablet 40 milliGRAM(s) Oral before breakfast  senna 2 Tablet(s) Oral at bedtime  sodium chloride 0.9%. 1000 milliLiter(s) (75 mL/Hr) IV Continuous <Continuous>    MEDICATIONS  (PRN):  acetaminophen   Tablet .. 650 milliGRAM(s) Oral every 6 hours PRN Mild Pain (1 - 3)  ALPRAZolam 0.25 milliGRAM(s) Oral daily PRN anxiety  benzocaine 15 mG/menthol 3.6 mG Lozenge 1 Lozenge Oral every 4 hours PRN Sore Throat  dextrose 40% Gel 15 Gram(s) Oral once PRN Blood Glucose LESS THAN 70 milliGRAM(s)/deciliter  glucagon  Injectable 1 milliGRAM(s) IntraMuscular once PRN Glucose LESS THAN 70 milligrams/deciliter  oxyCODONE    IR 5 milliGRAM(s) Oral every 4 hours PRN Severe Pain (7 - 10)  polyethylene glycol 3350 17 Gram(s) Oral daily PRN Constipation            ASSESSMENT/PLAN    86 woman s/p left hip fracture treated with hemiarthroplasty on 12/13 with functional deficits consisting of gait and ADL impairments with decreased endurance and strength, and newly diagnosed bilat DVTs and PE.     1.  Femoral fracture.  Plan: s/p hemiarthroplasty, Continue PT/OT   MVI, zinc and vit c for healing, Oxycodone [id number] prn, tylenol prn, ice packs prn, and lidocaine patches for pain management.       2.  DVT (deep venous thrombosis).  Plan: Eliquis 10mg q12 for total of 7 days (ended on 12/22), Started maintenance dose of 5mg q12  on 12/23 and will need this indefinitely       3. Pulmonary emboli.  Plan: Continue eliquis      4.  Type 2 diabetes mellitus without complication,: SSI. Will resume Metformin as outpatient (kidney function improved) and to follow up with PCP Dr. Gomez    5.  HTN (hypertension). :Norvasc: 2.5mg daily      6.  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       7.  Anemia due to blood loss: on iron supplements. HCT stable      d/c planning : home today

## 2019-01-04 NOTE — PROGRESS NOTE ADULT - SUBJECTIVE AND OBJECTIVE BOX
Patient is a 86y old  Female who presents with a chief complaint of fall resulting in left hip fx requiring hemiarthroplasty, functional deficits (04 Jan 2019 10:26)      Patient seen and examined at bedside. feels well, no complaints     ALLERGIES:  penicillin (Rash; Urticaria)    MEDICATIONS:  ALPRAZolam 0.25 milliGRAM(s) Oral <User Schedule>  ALPRAZolam 0.25 milliGRAM(s) Oral daily PRN  amLODIPine   Tablet 2.5 milliGRAM(s) Oral daily  ascorbic acid 500 milliGRAM(s) Oral two times a day  benzocaine 15 mG/menthol 3.6 mG Lozenge 1 Lozenge Oral every 4 hours PRN  docusate sodium 100 milliGRAM(s) Oral two times a day  ferrous    sulfate 325 milliGRAM(s) Oral daily  insulin lispro (HumaLOG) corrective regimen sliding scale   SubCutaneous before breakfast  multivitamin 1 Tablet(s) Oral daily  oxyCODONE    IR 5 milliGRAM(s) Oral every 4 hours PRN  sodium chloride 0.9%. 1000 milliLiter(s) IV Continuous <Continuous>    Vital Signs Last 24 Hrs  T(F): 98 (04 Jan 2019 08:16), Max: 98.5 (03 Jan 2019 20:35)  HR: 86 (04 Jan 2019 08:16) (86 - 88)  BP: 120/57 (04 Jan 2019 08:16) (112/63 - 120/57)  RR: 14 (04 Jan 2019 08:16) (14 - 14)  SpO2: 91% (04 Jan 2019 08:16) (91% - 96%)  I&O's Summary      PHYSICAL EXAM:  General: NAD, alert oriented   Neck: Supple, No JVD  Lungs: Clear to auscultation bilaterally  Cardio: RRR, S1/S2, No murmurs  Abdomen: Soft, Nontender, Nondistended; Bowel sounds present  Extremities: No clubbing, cyanosis, or edema    LABS:    CAPILLARY BLOOD GLUCOSE      POCT Blood Glucose.: 145 mg/dL (04 Jan 2019 07:46)    12-15 RulafmbyglU1H 6.1  12-14 [id number] 6.0    RADIOLOGY & ADDITIONAL TESTS:    Care Discussed with Consultants/Other Providers:

## 2019-01-04 NOTE — PROGRESS NOTE ADULT - PROVIDER SPECIALTY LIST ADULT
Hospitalist
Rehab Medicine

## 2019-01-04 NOTE — PROGRESS NOTE ADULT - ATTENDING COMMENTS
Chart reviewed. Patient had some chest pressure this am and xanax given as patient with prior history of anxiety. EKG no acute changes.   Patient reports that she is anxious about her discharge.   Left hip incision clean and healing well     d/c home today  with home care.   fu with PCP, ortho, and with Pulmonary recommended. discussed with trell at bedside on 1/3/19
Daniella Olivier MD
Daniella Olivire MD
No new issues overnight.  Pain controlled with tylenol   D/C planned for home in am with home care. d/w niece at bedside
Daniella Olivier MD
Daniella Olivier MD
Agree with above.  Pt undergoing rehab evaluation.  Medically stable  F/U labs, monitor vital signs
Agree with above.  Doing well.  Labs noted; stable.  Pain left hip is mild.  Continue comprehensive rehab, dvt ppx, pain management.

## 2019-01-08 PROBLEM — F41.9 ANXIETY DISORDER, UNSPECIFIED: Chronic | Status: ACTIVE | Noted: 2018-12-20

## 2019-01-08 PROBLEM — I82.409 ACUTE EMBOLISM AND THROMBOSIS OF UNSPECIFIED DEEP VEINS OF UNSPECIFIED LOWER EXTREMITY: Chronic | Status: ACTIVE | Noted: 2018-12-20

## 2019-01-17 ENCOUNTER — APPOINTMENT (OUTPATIENT)
Dept: ORTHOPEDIC SURGERY | Facility: CLINIC | Age: 84
End: 2019-01-17
Payer: MEDICARE

## 2019-01-17 VITALS — BODY MASS INDEX: 25.91 KG/M2 | HEIGHT: 60 IN | WEIGHT: 132 LBS

## 2019-01-17 VITALS — WEIGHT: 132 LBS | HEIGHT: 60 IN | BODY MASS INDEX: 25.91 KG/M2

## 2019-01-17 DIAGNOSIS — S72.002D FRACTURE OF UNSPECIFIED PART OF NECK OF LEFT FEMUR, SUBSEQUENT ENCOUNTER FOR CLOSED FRACTURE WITH ROUTINE HEALING: ICD-10-CM

## 2019-01-17 PROCEDURE — 73502 X-RAY EXAM HIP UNI 2-3 VIEWS: CPT | Mod: LT

## 2019-01-17 PROCEDURE — 99024 POSTOP FOLLOW-UP VISIT: CPT

## 2019-01-17 NOTE — ADDENDUM
[FreeTextEntry1] : I, Jennifer Mobley, acted solely as a scribe for Dr. Fredy Brenner on this date 01/17/2019 .

## 2019-01-17 NOTE — HISTORY OF PRESENT ILLNESS
[Clean/Dry/Intact] : clean, dry and intact [Healed] : healed [___ Weeks Post Op] : [unfilled] weeks post op [Neuro Intact] : an unremarkable neurological exam [Vascular Intact] : ~T peripheral vascular exam normal [Xray (Date:___)] : [unfilled] Xray -  [Doing Well] : is doing well [Excellent Pain Control] : has excellent pain control [No Sign of Infection] : is showing no signs of infection [Erythema] : not erythematous [Discharge] : absent of discharge [Swelling] : not swollen [Dehiscence] : not dehisced [de-identified] : s/p Left hip hemiarthroplasty on 12/13/2018. [de-identified] : 86 year old female presents for a post operative evaluation s/p Left hip hemiarthroplasty on 12/13/2018. Today she presents accompanied by her son. She is ambulating with a walker and notes that she has been doing well since the surgery and has been experiencing minimal pain. She notes that she has not had to take any pain medication. She has been attending home physical therapy and notes improvements in strength and ROM. She denies fever, chills, nausea, or vomiting.  [de-identified] : Left Lower Extremity\par o Hip :\par ¦ Inspection/Palpation : no tenderness, no swelling, no deformity, incision well healed\par ¦ Range of Motion : full in all planes, no crepitus. Mild pain.\par ¦ Stability : joint stability intact\par ¦ Strength : hip flexion 5/5\par ¦ Tests and Signs : all tests for stability normal\par o Muscle Tone : tone normal\par o Muscle Bulk : normal muscle bulk present\par o Skin : no erythema, no ecchymosis \par o Sensation : sensation to light touch intact\par o Vascular Exam : no edema, no cyanosis, dorsalis pedis artery pulse 2+, posterior tibial artery pulse 2+  [de-identified] : o Left Hip and pelvis : AP and lateral views were obtained, s/p Left hip hemiarthroplasty, arthroplasty in excellent position  [de-identified] : 86 year old female s/p Left hip hemiarthroplasty on 12/13/2018. [de-identified] : She is to continue with physical therapy. A prescription for outpatient Physical Therapy was provided. \par \par FU PRN.

## 2019-01-18 ENCOUNTER — APPOINTMENT (OUTPATIENT)
Dept: FAMILY MEDICINE | Facility: CLINIC | Age: 84
End: 2019-01-18
Payer: MEDICARE

## 2019-01-18 VITALS — RESPIRATION RATE: 16 BRPM

## 2019-01-18 VITALS
OXYGEN SATURATION: 90 % | TEMPERATURE: 98 F | HEART RATE: 99 BPM | DIASTOLIC BLOOD PRESSURE: 70 MMHG | SYSTOLIC BLOOD PRESSURE: 134 MMHG

## 2019-01-18 DIAGNOSIS — R42 DIZZINESS AND GIDDINESS: ICD-10-CM

## 2019-01-18 DIAGNOSIS — S72.002A FRACTURE OF UNSPECIFIED PART OF NECK OF LEFT FEMUR, INITIAL ENCOUNTER FOR CLOSED FRACTURE: ICD-10-CM

## 2019-01-18 PROCEDURE — 99495 TRANSJ CARE MGMT MOD F2F 14D: CPT | Mod: 25

## 2019-01-18 PROCEDURE — 36415 COLL VENOUS BLD VENIPUNCTURE: CPT

## 2019-01-18 RX ORDER — NITROFURANTOIN MACROCRYSTALS 50 MG/1
50 CAPSULE ORAL
Qty: 30 | Refills: 1 | Status: COMPLETED | COMMUNITY
Start: 2017-12-14 | End: 2019-01-18

## 2019-01-18 RX ORDER — RIVAROXABAN 15 MG/1
15 TABLET, FILM COATED ORAL TWICE DAILY
Qty: 180 | Refills: 4 | Status: COMPLETED | COMMUNITY
Start: 2017-01-10 | End: 2019-01-18

## 2019-01-18 NOTE — ASSESSMENT
[FreeTextEntry1] : Continue present medical management, status post hospital discharge. Reviewed with patient and family oral medications, discharge instructions and discharge papers.\par \par Comprehensive blood work obtained.\par \par Followup appointment one month

## 2019-01-18 NOTE — REVIEW OF SYSTEMS
[Anxiety] : anxiety [Negative] : Heme/Lymph [Fever] : no fever [Chills] : no chills [Fatigue] : no fatigue [Hot Flashes] : no hot flashes [Night Sweats] : no night sweats [Recent Change In Weight] : ~T no recent weight change [Chest Pain] : no chest pain [Palpitations] : no palpitations [Leg Claudication] : no leg claudication [Lower Ext Edema] : no lower extremity edema [Orthopnea] : no orthopnea [Paroysmal Nocturnal Dyspnea] : no paroysmal nocturnal dyspnea [FreeTextEntry9] : Ambulating with walker [de-identified] : improved

## 2019-01-18 NOTE — HEALTH RISK ASSESSMENT
[No falls in past year] : Patient reported no falls in the past year [0] : 2) Feeling down, depressed, or hopeless: Not at all (0) [Discussed at today's visit] : Advance Directives Discussed at today's visit [Patient's preferences updated] : Patient's preferences updated  [Designated Healthcare Proxy] : Designated healthcare proxy [Relationship: ___] : Relationship: [unfilled] [Aggressive treatment] : aggressive treatment [] : No

## 2019-01-18 NOTE — HISTORY OF PRESENT ILLNESS
[Post-hospitalization from ___ Hospital] : Post-hospitalization from [unfilled] Hospital [Admitted on: ___] : The patient was admitted on [unfilled] [Discharged on ___] : discharged on [unfilled] [Discharge Summary] : discharge summary [Pertinent Labs] : pertinent labs [Radiology Findings] : radiology findings [Discharge Med List] : discharge medication list [Other: ____] : [unfilled] [Med Reconciliation] : medication reconciliation has been completed [Patient Contacted By: ____] : and contacted by [unfilled] [FreeTextEntry2] : Patient is an 86-year-old female, who presents today status post hospitalization patient was hospitalized on December 13. Status post fall fractured left hip. Patient underwent hemiarthroplasty surgery went well. Unfortunately, while hospitalized patient developed bilateral DVT and VQ scan was done and showed that she had a small pulmonary embolus. Socially. She was placed on anticoagulation. Patient while hospitalized required a transfusion of one unit of packed red blood cells in fact, she states, that she's feeling much better after she got that unit of packed red blood cells. She was transferred to acute rehabilitation on December 20 and discharged on January fourth 2019.

## 2019-01-18 NOTE — PHYSICAL EXAM
[No Acute Distress] : no acute distress [Well Nourished] : well nourished [Well Developed] : well developed [Well-Appearing] : well-appearing [Normal Voice/Communication] : normal voice/communication [Normal Sclera/Conjunctiva] : normal sclera/conjunctiva [PERRL] : pupils equal round and reactive to light [EOMI] : extraocular movements intact [Normal Outer Ear/Nose] : the outer ears and nose were normal in appearance [Normal Oropharynx] : the oropharynx was normal [No JVD] : no jugular venous distention [Supple] : supple [No Lymphadenopathy] : no lymphadenopathy [Thyroid Normal, No Nodules] : the thyroid was normal and there were no nodules present [No Respiratory Distress] : no respiratory distress  [Clear to Auscultation] : lungs were clear to auscultation bilaterally [No Accessory Muscle Use] : no accessory muscle use [Normal Rate] : normal rate  [Regular Rhythm] : with a regular rhythm [Normal S1, S2] : normal S1 and S2 [No Murmur] : no murmur heard [No Carotid Bruits] : no carotid bruits [No Abdominal Bruit] : a ~M bruit was not heard ~T in the abdomen [No Varicosities] : no varicosities [Pedal Pulses Present] : the pedal pulses are present [No Edema] : there was no peripheral edema [No Extremity Clubbing/Cyanosis] : no extremity clubbing/cyanosis [No Palpable Aorta] : no palpable aorta [Soft] : abdomen soft [Non Tender] : non-tender [Non-distended] : non-distended [No Masses] : no abdominal mass palpated [No HSM] : no HSM [Normal Bowel Sounds] : normal bowel sounds [Normal Posterior Cervical Nodes] : no posterior cervical lymphadenopathy [Normal Anterior Cervical Nodes] : no anterior cervical lymphadenopathy [No CVA Tenderness] : no CVA  tenderness [No Spinal Tenderness] : no spinal tenderness [No Joint Swelling] : no joint swelling [Grossly Normal Strength/Tone] : grossly normal strength/tone [No Rash] : no rash [No Focal Deficits] : no focal deficits [Deep Tendon Reflexes (DTR)] : deep tendon reflexes were 2+ and symmetric [Normal Affect] : the affect was normal [Alert and Oriented x3] : oriented to person, place, and time [Normal Insight/Judgement] : insight and judgment were intact [de-identified] : Status post right hemiarthroplasty patient ambulating with walker

## 2019-01-19 LAB
BASOPHILS # BLD AUTO: 0.04 K/UL
BASOPHILS NFR BLD AUTO: 0.6 %
CHOLEST SERPL-MCNC: 167 MG/DL
CHOLEST/HDLC SERPL: 3.3 RATIO
EOSINOPHIL # BLD AUTO: 0.17 K/UL
EOSINOPHIL NFR BLD AUTO: 2.4 %
FERRITIN SERPL-MCNC: 159 NG/ML
FOLATE SERPL-MCNC: >20 NG/ML
HBA1C MFR BLD HPLC: 5.8 %
HCT VFR BLD CALC: 28.7 %
HDLC SERPL-MCNC: 51 MG/DL
HGB BLD-MCNC: 9 G/DL
IMM GRANULOCYTES NFR BLD AUTO: 0.1 %
IRON SATN MFR SERPL: 20 %
IRON SERPL-MCNC: 42 UG/DL
LDLC SERPL CALC-MCNC: 86 MG/DL
LYMPHOCYTES # BLD AUTO: 1.97 K/UL
LYMPHOCYTES NFR BLD AUTO: 28 %
MAN DIFF?: NORMAL
MCHC RBC-ENTMCNC: 29.8 PG
MCHC RBC-ENTMCNC: 31.4 GM/DL
MCV RBC AUTO: 95 FL
MONOCYTES # BLD AUTO: 0.57 K/UL
MONOCYTES NFR BLD AUTO: 8.1 %
NEUTROPHILS # BLD AUTO: 4.28 K/UL
NEUTROPHILS NFR BLD AUTO: 60.8 %
PLATELET # BLD AUTO: 287 K/UL
RBC # BLD: 3.02 M/UL
RBC # FLD: 16.4 %
TIBC SERPL-MCNC: 211 UG/DL
TRIGL SERPL-MCNC: 149 MG/DL
UIBC SERPL-MCNC: 169 UG/DL
VIT B12 SERPL-MCNC: 529 PG/ML
WBC # FLD AUTO: 7.04 K/UL

## 2019-02-01 ENCOUNTER — APPOINTMENT (OUTPATIENT)
Dept: FAMILY MEDICINE | Facility: CLINIC | Age: 84
End: 2019-02-01

## 2019-02-04 ENCOUNTER — APPOINTMENT (OUTPATIENT)
Dept: FAMILY MEDICINE | Facility: CLINIC | Age: 84
End: 2019-02-04

## 2019-02-05 ENCOUNTER — MEDICATION RENEWAL (OUTPATIENT)
Age: 84
End: 2019-02-05

## 2019-02-14 ENCOUNTER — APPOINTMENT (OUTPATIENT)
Dept: FAMILY MEDICINE | Facility: CLINIC | Age: 84
End: 2019-02-14
Payer: MEDICARE

## 2019-02-14 VITALS
WEIGHT: 124 LBS | DIASTOLIC BLOOD PRESSURE: 70 MMHG | SYSTOLIC BLOOD PRESSURE: 138 MMHG | OXYGEN SATURATION: 96 % | HEART RATE: 86 BPM | HEIGHT: 60 IN | BODY MASS INDEX: 24.35 KG/M2 | TEMPERATURE: 97.7 F

## 2019-02-14 VITALS — RESPIRATION RATE: 14 BRPM

## 2019-02-14 DIAGNOSIS — R49.0 DYSPHONIA: ICD-10-CM

## 2019-02-14 PROCEDURE — 99214 OFFICE O/P EST MOD 30 MIN: CPT

## 2019-02-14 NOTE — HEALTH RISK ASSESSMENT
[] : No [Any fall with injury in past year] : Patient reported fall with injury in the past year [0] : 2) Feeling down, depressed, or hopeless: Not at all (0) [[id number]] : 0

## 2019-02-14 NOTE — ASSESSMENT
[FreeTextEntry1] : Assessment and plan:\par \par 1. Hoarseness postop status post intubation, most likely vocal cord trauma. If persists. Ear, nose, and throat evaluation. Daughter-in-law was told to call me if symptoms persist changing her to worsen.\par \par 2. Generalized anxiety well controlled with alprazolam and daughter-in-law administers medication.\par \par 3. Status post pulmonary embolus continue anticoagulation.\par \par 4. Diabetes mellitus, type II most recent hemoglobin A1c 5.8, excellent control. Continue present medical management would not change no concentrated sweets, consistent carbohydrate diet

## 2019-02-14 NOTE — PHYSICAL EXAM
[No Acute Distress] : no acute distress [Well Nourished] : well nourished [Well Developed] : well developed [Well-Appearing] : well-appearing [Normal Sclera/Conjunctiva] : normal sclera/conjunctiva [PERRL] : pupils equal round and reactive to light [EOMI] : extraocular movements intact [Normal Outer Ear/Nose] : the outer ears and nose were normal in appearance [No JVD] : no jugular venous distention [Supple] : supple [No Lymphadenopathy] : no lymphadenopathy [Thyroid Normal, No Nodules] : the thyroid was normal and there were no nodules present [No Respiratory Distress] : no respiratory distress  [Clear to Auscultation] : lungs were clear to auscultation bilaterally [No Accessory Muscle Use] : no accessory muscle use [Normal Rate] : normal rate  [Regular Rhythm] : with a regular rhythm [Normal S1, S2] : normal S1 and S2 [No Murmur] : no murmur heard [No Carotid Bruits] : no carotid bruits [No Abdominal Bruit] : a ~M bruit was not heard ~T in the abdomen [No Varicosities] : no varicosities [Pedal Pulses Present] : the pedal pulses are present [No Edema] : there was no peripheral edema [No Extremity Clubbing/Cyanosis] : no extremity clubbing/cyanosis [No Palpable Aorta] : no palpable aorta [Soft] : abdomen soft [Non Tender] : non-tender [Non-distended] : non-distended [No Masses] : no abdominal mass palpated [No HSM] : no HSM [Normal Bowel Sounds] : normal bowel sounds [Normal Posterior Cervical Nodes] : no posterior cervical lymphadenopathy [Normal Anterior Cervical Nodes] : no anterior cervical lymphadenopathy [No CVA Tenderness] : no CVA  tenderness [No Spinal Tenderness] : no spinal tenderness [No Joint Swelling] : no joint swelling [Grossly Normal Strength/Tone] : grossly normal strength/tone [No Rash] : no rash [No Focal Deficits] : no focal deficits [Deep Tendon Reflexes (DTR)] : deep tendon reflexes were 2+ and symmetric [Memory Grossly Normal] : memory grossly normal [Normal Affect] : the affect was normal [Alert and Oriented x3] : oriented to person, place, and time [Normal Mood] : the mood was normal [Normal Insight/Judgement] : insight and judgment were intact [de-identified] : Hoarse voice [de-identified] : Dry mucous membranes [de-identified] : Status post right hemiarthroplasty patient ambulating with walker

## 2019-02-14 NOTE — REVIEW OF SYSTEMS
[Fever] : no fever [Chills] : no chills [Fatigue] : no fatigue [Hot Flashes] : no hot flashes [Night Sweats] : no night sweats [Recent Change In Weight] : ~T no recent weight change [Chest Pain] : no chest pain [Palpitations] : no palpitations [Leg Claudication] : no leg claudication [Lower Ext Edema] : no lower extremity edema [Orthopnea] : no orthopnea [Paroysmal Nocturnal Dyspnea] : no paroysmal nocturnal dyspnea [Anxiety] : anxiety [Negative] : Heme/Lymph [FreeTextEntry6] : Hoarseness of voice [FreeTextEntry9] : Ambulating with walker [de-identified] : improved

## 2019-02-14 NOTE — HISTORY OF PRESENT ILLNESS
[Family Member] : family member [FreeTextEntry1] : Please see history of present illness [de-identified] : Patient is an 86-year-old female, who presents today for followup appointment. Patient has multiple medical problems status post fall with hip fracture status post ORIF. While hospitalized, pulmonary embolism. Patient presently states, that she's feeling much better. She has multiple medical problems, which include diabetes mellitus, type II, hypertension, hyperlipidemia anemia, postop, generalized anxiety, and atherosclerotic heart disease. Patient does have hoarseness, status post surgery. Detailed discussion with patient and daughter-in-law that if the hoarseness persists, most likely secondary to trauma. The vocal cords after intubation. I will have her evaluated by ear, nose, and throat to evaluate vocal cords.

## 2019-04-08 ENCOUNTER — MEDICATION RENEWAL (OUTPATIENT)
Age: 84
End: 2019-04-08

## 2019-04-12 ENCOUNTER — APPOINTMENT (OUTPATIENT)
Dept: FAMILY MEDICINE | Facility: CLINIC | Age: 84
End: 2019-04-12
Payer: MEDICARE

## 2019-04-12 VITALS
DIASTOLIC BLOOD PRESSURE: 70 MMHG | TEMPERATURE: 98.3 F | HEART RATE: 83 BPM | OXYGEN SATURATION: 97 % | SYSTOLIC BLOOD PRESSURE: 148 MMHG

## 2019-04-12 PROCEDURE — 99214 OFFICE O/P EST MOD 30 MIN: CPT

## 2019-04-12 RX ORDER — OXYCODONE 5 MG/1
5 TABLET ORAL
Qty: 14 | Refills: 0 | Status: COMPLETED | COMMUNITY
Start: 2019-01-04

## 2019-04-12 NOTE — PHYSICAL EXAM
[No Acute Distress] : no acute distress [Well Developed] : well developed [Well Nourished] : well nourished [Well-Appearing] : well-appearing [Normal Voice/Communication] : normal voice/communication [EOMI] : extraocular movements intact [Normal Sclera/Conjunctiva] : normal sclera/conjunctiva [PERRL] : pupils equal round and reactive to light [Normal Oropharynx] : the oropharynx was normal [Normal Outer Ear/Nose] : the outer ears and nose were normal in appearance [No JVD] : no jugular venous distention [Normal TMs] : both tympanic membranes were normal [No Lymphadenopathy] : no lymphadenopathy [Thyroid Normal, No Nodules] : the thyroid was normal and there were no nodules present [Supple] : supple [No Respiratory Distress] : no respiratory distress  [Clear to Auscultation] : lungs were clear to auscultation bilaterally [Normal Rate] : normal rate  [No Accessory Muscle Use] : no accessory muscle use [No Murmur] : no murmur heard [Normal S1, S2] : normal S1 and S2 [Regular Rhythm] : with a regular rhythm [No Abdominal Bruit] : a ~M bruit was not heard ~T in the abdomen [No Carotid Bruits] : no carotid bruits [Pedal Pulses Present] : the pedal pulses are present [No Varicosities] : no varicosities [No Palpable Aorta] : no palpable aorta [No Edema] : there was no peripheral edema [No Extremity Clubbing/Cyanosis] : no extremity clubbing/cyanosis [Soft] : abdomen soft [Non Tender] : non-tender [No Masses] : no abdominal mass palpated [No HSM] : no HSM [Non-distended] : non-distended [Normal Bowel Sounds] : normal bowel sounds [Normal Posterior Cervical Nodes] : no posterior cervical lymphadenopathy [Normal Supraclavicular Nodes] : no supraclavicular lymphadenopathy [Normal Anterior Cervical Nodes] : no anterior cervical lymphadenopathy [No CVA Tenderness] : no CVA  tenderness [Grossly Normal Strength/Tone] : grossly normal strength/tone [No Joint Swelling] : no joint swelling [No Spinal Tenderness] : no spinal tenderness [No Rash] : no rash [Coordination Grossly Intact] : coordination grossly intact [No Focal Deficits] : no focal deficits [Deep Tendon Reflexes (DTR)] : deep tendon reflexes were 2+ and symmetric [Speech Grossly Normal] : speech grossly normal [Memory Grossly Normal] : memory grossly normal [Normal Affect] : the affect was normal [Normal Mood] : the mood was normal [Alert and Oriented x3] : oriented to person, place, and time [de-identified] : Status post right hemiarthroplasty patient ambulating with walker [Normal Insight/Judgement] : insight and judgment were intact

## 2019-04-12 NOTE — HEALTH RISK ASSESSMENT
[] : No [Any fall with injury in past year] : Patient reported fall with injury in the past year [0] : 2) Feeling down, depressed, or hopeless: Not at all (0) [[id number]] : 0

## 2019-04-12 NOTE — HISTORY OF PRESENT ILLNESS
[Family Member] : family member [FreeTextEntry1] : Please see history of present illness [de-identified] : Patient is an 86-year-old female, who presents today for followup appointment accompanied by her daughter-in-law. Patient is in her usual state of health. Presently denies any chest pain or shortness of breath. Medical history is significant for ischemic heart disease, diabetes mellitus, type II, hypertension, and hyperlipidemia.\par \par Patient is ambulating with walker, status post fall, status post fractured hip, status post pulmonary embolus. Patient is doing remarkably well

## 2019-04-12 NOTE — REVIEW OF SYSTEMS
[Fever] : no fever [Chills] : no chills [Fatigue] : no fatigue [Hot Flashes] : no hot flashes [Night Sweats] : no night sweats [Recent Change In Weight] : ~T no recent weight change [Chest Pain] : no chest pain [Palpitations] : no palpitations [Leg Claudication] : no leg claudication [Lower Ext Edema] : no lower extremity edema [Orthopnea] : no orthopnea [Paroysmal Nocturnal Dyspnea] : no paroysmal nocturnal dyspnea [Anxiety] : anxiety [Negative] : Neurological [FreeTextEntry9] : Ambulating with walker [de-identified] : improved

## 2019-04-25 ENCOUNTER — MEDICATION RENEWAL (OUTPATIENT)
Age: 84
End: 2019-04-25

## 2019-04-29 ENCOUNTER — MEDICATION RENEWAL (OUTPATIENT)
Age: 84
End: 2019-04-29

## 2019-05-24 ENCOUNTER — MEDICATION RENEWAL (OUTPATIENT)
Age: 84
End: 2019-05-24

## 2019-06-06 ENCOUNTER — INBOUND DOCUMENT (OUTPATIENT)
Age: 84
End: 2019-06-06

## 2019-06-14 ENCOUNTER — APPOINTMENT (OUTPATIENT)
Dept: FAMILY MEDICINE | Facility: CLINIC | Age: 84
End: 2019-06-14
Payer: MEDICARE

## 2019-06-14 VITALS
DIASTOLIC BLOOD PRESSURE: 64 MMHG | HEART RATE: 88 BPM | HEIGHT: 60 IN | SYSTOLIC BLOOD PRESSURE: 134 MMHG | WEIGHT: 130 LBS | OXYGEN SATURATION: 98 % | TEMPERATURE: 98 F | BODY MASS INDEX: 25.52 KG/M2

## 2019-06-14 VITALS — RESPIRATION RATE: 14 BRPM

## 2019-06-14 DIAGNOSIS — R53.83 OTHER FATIGUE: ICD-10-CM

## 2019-06-14 PROCEDURE — 36415 COLL VENOUS BLD VENIPUNCTURE: CPT

## 2019-06-14 PROCEDURE — 99214 OFFICE O/P EST MOD 30 MIN: CPT | Mod: 25

## 2019-06-14 NOTE — REVIEW OF SYSTEMS
[Fever] : no fever [Chills] : no chills [Hot Flashes] : no hot flashes [Fatigue] : fatigue [Night Sweats] : no night sweats [Chest Pain] : no chest pain [Recent Change In Weight] : ~T no recent weight change [Leg Claudication] : no leg claudication [Palpitations] : no palpitations [Lower Ext Edema] : no lower extremity edema [Orthopnea] : no orthopnea [Paroysmal Nocturnal Dyspnea] : no paroysmal nocturnal dyspnea [Anxiety] : anxiety [Negative] : Neurological [FreeTextEntry9] : Ambulating with walker Or cane [de-identified] : improved

## 2019-06-14 NOTE — HEALTH RISK ASSESSMENT
[] : Yes [Any fall with injury in past year] : Patient reported fall with injury in the past year [0] : 2) Feeling down, depressed, or hopeless: Not at all (0) [[id number]] : 0 [de-identified] : Socially

## 2019-06-14 NOTE — ASSESSMENT
[FreeTextEntry1] : Assessment and plan:\par \par 1. Fatigue is multifactorial. I recommend multivitamins. I will check comprehensive blood work.\par \par 2. Hypertension excellent blood pressure control. Continue amlodipine 5 mg p.o. daily.\par \par 3. Diabetes mellitus, type II, which is well controlled. Check hemoglobin A1c continue consistent carbohydrate, no concentrated sweets, and metformin 500 mg twice a day.\par \par 4. Hyperlipidemia. Patient is following a low-fat, low-cholesterol diet and she will continue atorvastatin 10 mg p.o. at bedtime.\par \par 5. Status post pulmonary embolus continue anticoagulation with Eliquis 5 mg p.o. twice a day

## 2019-06-14 NOTE — PHYSICAL EXAM
[No Acute Distress] : no acute distress [Well Developed] : well developed [Well-Appearing] : well-appearing [Well Nourished] : well nourished [Normal Voice/Communication] : normal voice/communication [PERRL] : pupils equal round and reactive to light [Normal Sclera/Conjunctiva] : normal sclera/conjunctiva [Normal Oropharynx] : the oropharynx was normal [EOMI] : extraocular movements intact [Normal Outer Ear/Nose] : the outer ears and nose were normal in appearance [Normal TMs] : both tympanic membranes were normal [No JVD] : no jugular venous distention [Supple] : supple [Thyroid Normal, No Nodules] : the thyroid was normal and there were no nodules present [No Lymphadenopathy] : no lymphadenopathy [No Respiratory Distress] : no respiratory distress  [No Accessory Muscle Use] : no accessory muscle use [Normal Rate] : normal rate  [Clear to Auscultation] : lungs were clear to auscultation bilaterally [Regular Rhythm] : with a regular rhythm [No Murmur] : no murmur heard [No Carotid Bruits] : no carotid bruits [Normal S1, S2] : normal S1 and S2 [Pedal Pulses Present] : the pedal pulses are present [No Abdominal Bruit] : a ~M bruit was not heard ~T in the abdomen [No Varicosities] : no varicosities [No Extremity Clubbing/Cyanosis] : no extremity clubbing/cyanosis [Soft] : abdomen soft [No Palpable Aorta] : no palpable aorta [No Edema] : there was no peripheral edema [Non-distended] : non-distended [Non Tender] : non-tender [No Masses] : no abdominal mass palpated [Normal Bowel Sounds] : normal bowel sounds [No HSM] : no HSM [Normal Anterior Cervical Nodes] : no anterior cervical lymphadenopathy [Normal Posterior Cervical Nodes] : no posterior cervical lymphadenopathy [Normal Supraclavicular Nodes] : no supraclavicular lymphadenopathy [No CVA Tenderness] : no CVA  tenderness [No Spinal Tenderness] : no spinal tenderness [Coordination Grossly Intact] : coordination grossly intact [No Joint Swelling] : no joint swelling [No Rash] : no rash [Grossly Normal Strength/Tone] : grossly normal strength/tone [No Focal Deficits] : no focal deficits [Deep Tendon Reflexes (DTR)] : deep tendon reflexes were 2+ and symmetric [Speech Grossly Normal] : speech grossly normal [Memory Grossly Normal] : memory grossly normal [Normal Affect] : the affect was normal [Alert and Oriented x3] : oriented to person, place, and time [Normal Insight/Judgement] : insight and judgment were intact [Normal Mood] : the mood was normal [de-identified] : Patient complaining of fatigue. No acute finding [de-identified] : Status post right hemiarthroplasty patient ambulating with cane

## 2019-06-14 NOTE — HISTORY OF PRESENT ILLNESS
[Family Member] : family member [FreeTextEntry1] : Fatigue, anemia, diabetes mellitus, type II, hypertension, hyperlipidemia, history of pulmonary embolism [de-identified] : Patient is an 86-year-old female, who presents today with her daughter-in-law for followup appointment. Patient's chief complaint is fatigue which has been a chronic problem. She also has a history of anemia. I will obtain comprehensive blood work. Medical history is significant for anemia, ischemic heart disease, diabetes mellitus, type II. Generalized anxiety disorder, which is well controlled with anxiolytic. There is no sign of abuse. Medication is administered by her daughter-in-law history is also significant for hypertension, hyperlipidemia, and pulmonary embolism, for which she is on anticoagulation.\par \par Patient ambulates with cane. Status post history of hip fracture status post ORIF doing relatively well. Daughter-in-law says she has improved immensely but still needs cane to ambulate.\par \par The patient is awake, alert, and oriented x3, in no acute distress. She is calm, cooperative, well-groomed, and nourished. She denies any chest pain, shortness of breath, nausea, or vomiting. As stated earlier, her chief complaint today is fatigue.

## 2019-06-17 LAB
ALBUMIN SERPL ELPH-MCNC: 4.6 G/DL
ALP BLD-CCNC: 55 U/L
ALT SERPL-CCNC: 13 U/L
ANION GAP SERPL CALC-SCNC: 13 MMOL/L
AST SERPL-CCNC: 12 U/L
BASOPHILS # BLD AUTO: 0.08 K/UL
BASOPHILS NFR BLD AUTO: 1 %
BILIRUB SERPL-MCNC: 0.2 MG/DL
BUN SERPL-MCNC: 24 MG/DL
CALCIUM SERPL-MCNC: 10 MG/DL
CHLORIDE SERPL-SCNC: 107 MMOL/L
CHOLEST SERPL-MCNC: 173 MG/DL
CHOLEST/HDLC SERPL: 4.7 RATIO
CO2 SERPL-SCNC: 19 MMOL/L
CREAT SERPL-MCNC: 1.24 MG/DL
EOSINOPHIL # BLD AUTO: 0.23 K/UL
EOSINOPHIL NFR BLD AUTO: 2.9 %
ESTIMATED AVERAGE GLUCOSE: 143 MG/DL
FERRITIN SERPL-MCNC: 163 NG/ML
FOLATE SERPL-MCNC: >20 NG/ML
GLUCOSE SERPL-MCNC: 159 MG/DL
HBA1C MFR BLD HPLC: 6.6 %
HCT VFR BLD CALC: 36.6 %
HDLC SERPL-MCNC: 37 MG/DL
HGB BLD-MCNC: 11.7 G/DL
IMM GRANULOCYTES NFR BLD AUTO: 1 %
IRON SATN MFR SERPL: 32 %
IRON SERPL-MCNC: 77 UG/DL
LDLC SERPL CALC-MCNC: 71 MG/DL
LYMPHOCYTES # BLD AUTO: 2.7 K/UL
LYMPHOCYTES NFR BLD AUTO: 34 %
MAN DIFF?: NORMAL
MCHC RBC-ENTMCNC: 30.4 PG
MCHC RBC-ENTMCNC: 32 GM/DL
MCV RBC AUTO: 95.1 FL
MONOCYTES # BLD AUTO: 0.68 K/UL
MONOCYTES NFR BLD AUTO: 8.6 %
NEUTROPHILS # BLD AUTO: 4.17 K/UL
NEUTROPHILS NFR BLD AUTO: 52.5 %
PLATELET # BLD AUTO: 327 K/UL
POTASSIUM SERPL-SCNC: 4.2 MMOL/L
PROT SERPL-MCNC: 7.8 G/DL
RBC # BLD: 3.85 M/UL
RBC # FLD: 15.3 %
SODIUM SERPL-SCNC: 139 MMOL/L
T4 FREE SERPL-MCNC: 1.3 NG/DL
TIBC SERPL-MCNC: 240 UG/DL
TRIGL SERPL-MCNC: 324 MG/DL
TSH SERPL-ACNC: 1.16 UIU/ML
UIBC SERPL-MCNC: 163 UG/DL
VIT B12 SERPL-MCNC: 482 PG/ML
WBC # FLD AUTO: 7.94 K/UL

## 2019-07-01 ENCOUNTER — RX RENEWAL (OUTPATIENT)
Age: 84
End: 2019-07-01

## 2019-07-24 ENCOUNTER — MEDICATION RENEWAL (OUTPATIENT)
Age: 84
End: 2019-07-24

## 2019-08-21 NOTE — ASSESSMENT
[FreeTextEntry1] : Assessment and plan:\par \par 1. Hoarseness postop Has resolved\par \par 2. Generalized anxiety well controlled with alprazolam and daughter-in-law administers medication.\par \par 3. Status post pulmonary embolus continue anticoagulation.\par \par 4. Diabetes mellitus, type II most recent hemoglobin A1c 5.8, excellent control. Continue present medical management would not change no concentrated sweets, consistent carbohydrate diet\par \par 5. Hyperlipidemia lovastatin discontinued atorvastatin 10 mg p.o. at bedtime.\par \par 6. Hypertension excellent blood pressure control. Continue present medical management would not change.\par \par 7. Followup appointment in 6-8 weeks at that time. I will obtain comprehensive blood work Stable. Improved.

## 2019-09-03 ENCOUNTER — MEDICATION RENEWAL (OUTPATIENT)
Age: 84
End: 2019-09-03

## 2019-09-23 ENCOUNTER — APPOINTMENT (OUTPATIENT)
Dept: FAMILY MEDICINE | Facility: CLINIC | Age: 84
End: 2019-09-23
Payer: MEDICARE

## 2019-09-23 VITALS
SYSTOLIC BLOOD PRESSURE: 130 MMHG | HEART RATE: 90 BPM | BODY MASS INDEX: 26.7 KG/M2 | OXYGEN SATURATION: 96 % | TEMPERATURE: 98.2 F | DIASTOLIC BLOOD PRESSURE: 70 MMHG | HEIGHT: 60 IN | WEIGHT: 136 LBS

## 2019-09-23 DIAGNOSIS — G47.00 INSOMNIA, UNSPECIFIED: ICD-10-CM

## 2019-09-23 PROCEDURE — 90653 IIV ADJUVANT VACCINE IM: CPT

## 2019-09-23 PROCEDURE — 96372 THER/PROPH/DIAG INJ SC/IM: CPT

## 2019-09-23 PROCEDURE — G0008: CPT

## 2019-09-23 PROCEDURE — 99215 OFFICE O/P EST HI 40 MIN: CPT | Mod: 25

## 2019-09-23 RX ORDER — CYANOCOBALAMIN 1000 UG/ML
1000 INJECTION INTRAMUSCULAR; SUBCUTANEOUS
Qty: 0 | Refills: 0 | Status: COMPLETED | OUTPATIENT
Start: 2019-09-23

## 2019-09-23 RX ADMIN — CYANOCOBALAMIN 0 MCG/ML: 1000 INJECTION, SOLUTION INTRAMUSCULAR at 00:00

## 2019-09-23 NOTE — COUNSELING
[Adequate lighting] : Adequate lighting [Fall prevention counseling provided] : Fall prevention counseling provided [Use proper foot wear] : Use proper foot wear [No throw rugs] : No throw rugs [Use recommended devices] : Use recommended devices [Behavioral health counseling provided] : Behavioral health counseling provided [Sleep ___ hours/day] : Sleep [unfilled] hours/day [Engage in a relaxing activity] : Engage in a relaxing activity [AUDIT-C Screening administered and reviewed] : AUDIT-C Screening administered and reviewed [Plan in advance] : Plan in advance [Patient motivation] : Patient motivation [Good understanding] : Patient has a good understanding of lifestyle changes and steps needed to achieve self management goal

## 2019-09-23 NOTE — REVIEW OF SYSTEMS
[Fatigue] : fatigue [Muscle Weakness] : muscle weakness [Insomnia] : insomnia [Anxiety] : anxiety [Negative] : Heme/Lymph [Chills] : no chills [Fever] : no fever [Hot Flashes] : no hot flashes [Night Sweats] : no night sweats [Recent Change In Weight] : ~T no recent weight change [Chest Pain] : no chest pain [Palpitations] : no palpitations [Leg Claudication] : no leg claudication [Lower Ext Edema] : no lower extremity edema [Orthopnea] : no orthopnea [Paroysmal Nocturnal Dyspnea] : no paroysmal nocturnal dyspnea [FreeTextEntry9] : Ambulating with walker Or cane

## 2019-09-23 NOTE — HEALTH RISK ASSESSMENT
[No] : In the past 12 months have you used drugs other than those required for medical reasons? No [Any fall with injury in past year] : Patient reported fall with injury in the past year [0] : 2) Feeling down, depressed, or hopeless: Not at all (0) [] : No [Audit-CScore] : 0 [[id number]] : 0

## 2019-09-23 NOTE — PHYSICAL EXAM
[No Acute Distress] : no acute distress [Well Nourished] : well nourished [Well Developed] : well developed [Well-Appearing] : well-appearing [Normal Voice/Communication] : normal voice/communication [Normal] : no CVA or spinal tenderness [No Joint Swelling] : no joint swelling [Speech Grossly Normal] : speech grossly normal [Memory Grossly Normal] : memory grossly normal [Normal Affect] : the affect was normal [Alert and Oriented x3] : oriented to person, place, and time [Normal Mood] : the mood was normal [Normal Insight/Judgement] : insight and judgment were intact [de-identified] : Weakness [de-identified] : Unsteady gait ambulates with cane [de-identified] : Patient has an excoriation of the tip of her nose according to her daughter-in-law. It has been present for several weeks. Nonhealing. There are no raised lesions. Presently patient has antibiotic ointment. [de-identified] : Anxiety well controlled insomnia, worsening

## 2019-09-23 NOTE — ASSESSMENT
[FreeTextEntry1] : Assessment and plan:\par \par 1. Insomnia discussed with patient and daughter-in-law. Sleep hygiene. I will add mirtazapine 7.5 mg at bedtime.\par \par 2. Unsteady gait/fatigue/muscle weakness. Detailed discussion with patient and daughter-in-law regarding the need for physical therapy. This is not something that she is going to take a pill and its going to get better. She needs to participate in physical therapy to increase strength decrease pain and gait training. Patient is very resistant. She tells me that she doesn't want to do that. I spoke with the daughter-in-law. She says she will attempt to bring her to physical therapy.\par \par 3. Anemia. Patient states she feels better with vitamin B12, vitamin B12 intramuscular administered at next visit. Comprehensive blood work will be obtained.\par \par 4. Hypertension good blood pressure control. Continue amlodipine 5 mg\par \par 5. Hyperlipidemia. Discussed with patient low fat, low cholesterol diet, continue atorvastatin 10 mg p.o. at bedtime.\par \par 6. Non-insulin-dependent diabetes mellitus, type II. Patient doing well with metformin 500 mg twice a day.\par \par 7. Generalized anxiety alprazolam 0.25 mg up to twice a day only as needed. I daughter-in-law manage his medications.\par \par A. History of pulmonary embolism. Continue anticoagulation. Patient has been on anticoagulation since this past December. I think after 12 months of anticoagulation would attempt to discontinue it.\par \par B. Influenza vaccine. High dose administered.  C. Discussed with patient and daughter-in-law, excoriation of the nose if it does not heal. Patient will need to be seen by dermatology.\par \par Face-to-face with the patient 40 minutes. Multiple medical problems, no true polypharmacy. Patient is on multiple medications, but for valid reasons and 50% of this time was spent on coordination of care and counseling regarding unsteady gait

## 2019-09-23 NOTE — HISTORY OF PRESENT ILLNESS
[FreeTextEntry1] : Unsteady gait, history of fall, left hip fracture, fatigue, insomnia [de-identified] : Patient is an 87-year-old female, who presents today for followup and also for disease management accompanied by her daughter-in-law. Patient has multiple medical problems which include anemia, ischemic heart disease, diabetes mellitus, type II. Generalized anxiety disorder, insomnia, hypertension, hyperlipidemia, history of pulmonary embolism, history of unsteady gait status post fall with left hip fracture.\par \par Patient complains of insomnia unable to sleep unable to fall asleep. That's been ongoing. She has tried conservative management without success. Patient also complains of unsteady gait, weakness of lower extremities, yet. She has not participated with physical therapy.\par \par The patient is awake, alert, and oriented x3. She is in no acute distress. She is calm and cooperative. She denies any chest pain, shortness of breath, nausea, vomiting. Her chief complaint is unsteady gait and feeling fatigue.

## 2019-11-14 ENCOUNTER — MEDICATION RENEWAL (OUTPATIENT)
Age: 84
End: 2019-11-14

## 2020-01-01 NOTE — PROGRESS NOTE ADULT - ASSESSMENT
Will monitor and make sure outpatient pediatrician is aware.    IMP:  surgically stable            anemia of acute blood loss           anxiety              Plan:  PT           Lovenox for DVT prophylaxis            As per Medicine           check labs in am CBC, Renal, Hgb A1c

## 2020-01-17 ENCOUNTER — APPOINTMENT (OUTPATIENT)
Dept: FAMILY MEDICINE | Facility: CLINIC | Age: 85
End: 2020-01-17
Payer: MEDICARE

## 2020-01-17 VITALS
HEIGHT: 60 IN | DIASTOLIC BLOOD PRESSURE: 62 MMHG | SYSTOLIC BLOOD PRESSURE: 120 MMHG | WEIGHT: 145 LBS | TEMPERATURE: 98.2 F | OXYGEN SATURATION: 95 % | BODY MASS INDEX: 28.47 KG/M2 | HEART RATE: 106 BPM

## 2020-01-17 DIAGNOSIS — D64.9 ANEMIA, UNSPECIFIED: ICD-10-CM

## 2020-01-17 PROCEDURE — 36415 COLL VENOUS BLD VENIPUNCTURE: CPT

## 2020-01-17 PROCEDURE — 99214 OFFICE O/P EST MOD 30 MIN: CPT | Mod: 25

## 2020-01-17 NOTE — ASSESSMENT
[FreeTextEntry1] : Assessment and plan:\par \par 1. Nonhealing skin lesion tip of nose. I recommend dermatology evaluation and biopsy had a long and detailed discussion with patient and daughter-in-law. She finally agreed to be seen by dermatology.\par \par 2. Diabetes mellitus, type II check hemoglobin A1c blood work drawn in office. Continue no concentrated sweets, consistent carbohydrate diet continue metformin 500 mg twice a day.\par \par 3. Hypertension good blood pressure control. Continue amlodipine 5 mg p.o. daily, and lifestyle changes.\par \par 4. Generalized anxiety disorder well-controlled with alprazolam. Patient has been taking medications very sparingly and only as needed. There is no sign of abuse.\par \par 5. Hyperlipidemia. Continue low-fat, low-cholesterol diet. Increase physical activity as tolerated and continue atorvastatin 10 mg p.o. at bedtime.\par \par 6. History of pulmonary embolus continue anticoagulation Eliquis 5 mg p.o. twice a day.\par \par 7. Comprehensive blood work was drawn in office

## 2020-01-17 NOTE — HEALTH RISK ASSESSMENT
[Yes] : Yes [2 - 3 times a week (3 pts)] : 2 - 3  times a week (3 points) [1 or 2 (0 pts)] : 1 or 2 (0 points) [Never (0 pts)] : Never (0 points) [] : No [Audit-CScore] : 3

## 2020-01-17 NOTE — COUNSELING
[Fall prevention counseling provided] : Fall prevention counseling provided [Adequate lighting] : Adequate lighting [No throw rugs] : No throw rugs [Use proper foot wear] : Use proper foot wear [Use recommended devices] : Use recommended devices [Behavioral health counseling provided] : Behavioral health counseling provided [Sleep ___ hours/day] : Sleep [unfilled] hours/day [Engage in a relaxing activity] : Engage in a relaxing activity [Plan in advance] : Plan in advance [AUDIT-C Screening administered and reviewed] : AUDIT-C Screening administered and reviewed [None] : None [Needs reinforcement, provided] : Patient needs reinforcement on understanding of lifestyle changes and steps needed to achieve self management goal; reinforcement was provided

## 2020-01-17 NOTE — HISTORY OF PRESENT ILLNESS
[Family Member] : family member [FreeTextEntry1] : Please see history of present illness [de-identified] : Patient is an 87-year-old female, who presents today for followup with multiple medical problems. We will be addressing anemia, ischemic heart disease, diabetes mellitus, type II. Generalized anxiety disorder, hypertension, and hyperlipidemia. Patient has a nonhealing skin lesion located on the tip of her nose that is scabbed over. Presently on recognizable what type of lesion at its patient has declined to go see a dermatologist in the past. I will discuss with patient and daughter-in-law needs to see dermatologist. Patient has unsteady gait status post hip fracture ambulates well with a cane.

## 2020-01-17 NOTE — PHYSICAL EXAM
[No Acute Distress] : no acute distress [Well Nourished] : well nourished [Well Developed] : well developed [Well-Appearing] : well-appearing [Normal Voice/Communication] : normal voice/communication [Normal] : no CVA or spinal tenderness [No Joint Swelling] : no joint swelling [Speech Grossly Normal] : speech grossly normal [Memory Grossly Normal] : memory grossly normal [Normal Affect] : the affect was normal [Alert and Oriented x3] : oriented to person, place, and time [Normal Mood] : the mood was normal [Normal Insight/Judgement] : insight and judgment were intact [de-identified] : Weakness [de-identified] : Nonhealing skin lesion tip of nose [de-identified] : Unsteady gait ambulates with cane [de-identified] : Anxiety well controlled insomnia, worsening

## 2020-01-17 NOTE — REVIEW OF SYSTEMS
[Fatigue] : fatigue [Muscle Weakness] : muscle weakness [Insomnia] : insomnia [Anxiety] : anxiety [Negative] : Heme/Lymph [Fever] : no fever [Chills] : no chills [Hot Flashes] : no hot flashes [Night Sweats] : no night sweats [Recent Change In Weight] : ~T no recent weight change [Chest Pain] : no chest pain [Palpitations] : no palpitations [Leg Claudication] : no leg claudication [Lower Ext Edema] : no lower extremity edema [Orthopnea] : no orthopnea [FreeTextEntry9] : Ambulating with walker Or cane [de-identified] : Nonhealing skin lesion

## 2020-01-18 LAB
ALBUMIN SERPL ELPH-MCNC: 4.7 G/DL
ALP BLD-CCNC: 60 U/L
ALT SERPL-CCNC: 12 U/L
ANION GAP SERPL CALC-SCNC: 16 MMOL/L
AST SERPL-CCNC: 15 U/L
BASOPHILS # BLD AUTO: 0.08 K/UL
BASOPHILS NFR BLD AUTO: 1.1 %
BILIRUB SERPL-MCNC: 0.3 MG/DL
BUN SERPL-MCNC: 27 MG/DL
CALCIUM SERPL-MCNC: 9.8 MG/DL
CHLORIDE SERPL-SCNC: 103 MMOL/L
CHOLEST SERPL-MCNC: 219 MG/DL
CHOLEST/HDLC SERPL: 4.8 RATIO
CO2 SERPL-SCNC: 20 MMOL/L
CREAT SERPL-MCNC: 1.56 MG/DL
EOSINOPHIL # BLD AUTO: 0.15 K/UL
EOSINOPHIL NFR BLD AUTO: 2.1 %
ESTIMATED AVERAGE GLUCOSE: 166 MG/DL
FOLATE SERPL-MCNC: >20 NG/ML
GLUCOSE SERPL-MCNC: 186 MG/DL
HBA1C MFR BLD HPLC: 7.4 %
HCT VFR BLD CALC: 39 %
HDLC SERPL-MCNC: 45 MG/DL
HGB BLD-MCNC: 12.7 G/DL
IMM GRANULOCYTES NFR BLD AUTO: 0.3 %
LDLC SERPL CALC-MCNC: NORMAL MG/DL
LYMPHOCYTES # BLD AUTO: 2.22 K/UL
LYMPHOCYTES NFR BLD AUTO: 31.1 %
MAN DIFF?: NORMAL
MCHC RBC-ENTMCNC: 30.6 PG
MCHC RBC-ENTMCNC: 32.6 GM/DL
MCV RBC AUTO: 94 FL
MONOCYTES # BLD AUTO: 0.66 K/UL
MONOCYTES NFR BLD AUTO: 9.3 %
NEUTROPHILS # BLD AUTO: 4 K/UL
NEUTROPHILS NFR BLD AUTO: 56.1 %
PLATELET # BLD AUTO: 251 K/UL
POTASSIUM SERPL-SCNC: 4.7 MMOL/L
PROT SERPL-MCNC: 7.7 G/DL
RBC # BLD: 4.15 M/UL
RBC # FLD: 13.5 %
SODIUM SERPL-SCNC: 139 MMOL/L
TRIGL SERPL-MCNC: 486 MG/DL
VIT B12 SERPL-MCNC: 660 PG/ML
WBC # FLD AUTO: 7.13 K/UL

## 2020-02-05 ENCOUNTER — RX RENEWAL (OUTPATIENT)
Age: 85
End: 2020-02-05

## 2020-04-17 ENCOUNTER — APPOINTMENT (OUTPATIENT)
Dept: FAMILY MEDICINE | Facility: CLINIC | Age: 85
End: 2020-04-17

## 2020-05-18 ENCOUNTER — RX RENEWAL (OUTPATIENT)
Age: 85
End: 2020-05-18

## 2020-06-15 ENCOUNTER — RX RENEWAL (OUTPATIENT)
Age: 85
End: 2020-06-15

## 2020-09-08 ENCOUNTER — APPOINTMENT (OUTPATIENT)
Dept: FAMILY MEDICINE | Facility: HOME HEALTH | Age: 85
End: 2020-09-08
Payer: MEDICARE

## 2020-09-08 PROCEDURE — 90686 IIV4 VACC NO PRSV 0.5 ML IM: CPT

## 2020-09-08 PROCEDURE — G0008: CPT

## 2020-09-08 PROCEDURE — 99349 HOME/RES VST EST MOD MDM 40: CPT | Mod: 25

## 2020-09-10 ENCOUNTER — LABORATORY RESULT (OUTPATIENT)
Age: 85
End: 2020-09-10

## 2020-09-17 ENCOUNTER — APPOINTMENT (OUTPATIENT)
Dept: FAMILY MEDICINE | Facility: CLINIC | Age: 85
End: 2020-09-17

## 2021-01-27 ENCOUNTER — RX RENEWAL (OUTPATIENT)
Age: 86
End: 2021-01-27

## 2021-02-10 NOTE — PATIENT PROFILE ADULT - INFORMATION PROVIDED TO:
I have already reviewed labs.  They are in the chart.  She was notified of results on 2/4.  There is also a separate message about his u/s being normal.  
Patient's mother called requesting lab results and referral status to [facility] in [place]  
Referrals completed through medicaid.  Patient has a collection balance and cannot be scheduled until arrangements are made.  LMOM notifying mom.  
patient

## 2021-03-05 ENCOUNTER — RX RENEWAL (OUTPATIENT)
Age: 86
End: 2021-03-05

## 2021-07-08 ENCOUNTER — APPOINTMENT (OUTPATIENT)
Dept: FAMILY MEDICINE | Facility: CLINIC | Age: 86
End: 2021-07-08
Payer: MEDICARE

## 2021-07-08 VITALS
SYSTOLIC BLOOD PRESSURE: 130 MMHG | RESPIRATION RATE: 14 BRPM | BODY MASS INDEX: 27.88 KG/M2 | DIASTOLIC BLOOD PRESSURE: 70 MMHG | OXYGEN SATURATION: 96 % | HEIGHT: 60 IN | HEART RATE: 116 BPM | WEIGHT: 142 LBS | TEMPERATURE: 96.8 F

## 2021-07-08 DIAGNOSIS — Z00.00 ENCOUNTER FOR GENERAL ADULT MEDICAL EXAMINATION W/OUT ABNORMAL FINDINGS: ICD-10-CM

## 2021-07-08 DIAGNOSIS — I25.10 ATHEROSCLEROTIC HEART DISEASE OF NATIVE CORONARY ARTERY W/OUT ANGINA PECTORIS: ICD-10-CM

## 2021-07-08 PROCEDURE — 99214 OFFICE O/P EST MOD 30 MIN: CPT | Mod: 25

## 2021-07-08 PROCEDURE — 36415 COLL VENOUS BLD VENIPUNCTURE: CPT

## 2021-07-08 PROCEDURE — 96372 THER/PROPH/DIAG INJ SC/IM: CPT

## 2021-07-08 RX ORDER — CYANOCOBALAMIN 1000 UG/ML
1000 INJECTION INTRAMUSCULAR; SUBCUTANEOUS
Qty: 0 | Refills: 0 | Status: COMPLETED | OUTPATIENT
Start: 2021-07-08

## 2021-07-08 RX ADMIN — CYANOCOBALAMIN 0 MCG/ML: 1000 INJECTION INTRAMUSCULAR; SUBCUTANEOUS at 00:00

## 2021-07-08 NOTE — PHYSICAL EXAM
[Well Nourished] : well nourished [Well Developed] : well developed [Well-Appearing] : well-appearing [Normal Voice/Communication] : normal voice/communication [Normal] : no CVA or spinal tenderness [No Joint Swelling] : no joint swelling [Speech Grossly Normal] : speech grossly normal [Memory Grossly Normal] : memory grossly normal [Normal Affect] : the affect was normal [Alert and Oriented x3] : oriented to person, place, and time [Normal Mood] : the mood was normal [Normal Insight/Judgement] : insight and judgment were intact [de-identified] : Weakness [de-identified] : Nonhealing skin lesion tip of nose [de-identified] : Unsteady gait ambulates with cane [de-identified] : Anxiety well controlled insomnia, worsening

## 2021-07-08 NOTE — HEALTH RISK ASSESSMENT
[] : No [No] : In the past 12 months have you used drugs other than those required for medical reasons? No [No falls in past year] : Patient reported no falls in the past year [0] : 2) Feeling down, depressed, or hopeless: Not at all (0) [PHQ-2 Negative - No further assessment needed] : PHQ-2 Negative - No further assessment needed [Audit-CScore] : 0 [[id number]] : 0 [Reviewed no changes] : Reviewed, no changes [Designated Healthcare Proxy] : Designated healthcare proxy [Relationship: ___] : Relationship: [unfilled] [Aggressive treatment] : aggressive treatment [I will adhere to the patient's wishes.] : I will adhere to the patient's wishes. [AdvancecareDate] : 07/21

## 2021-07-08 NOTE — REVIEW OF SYSTEMS
[Fever] : no fever [Chills] : no chills [Fatigue] : fatigue [Hot Flashes] : no hot flashes [Night Sweats] : no night sweats [Recent Change In Weight] : ~T no recent weight change [Chest Pain] : no chest pain [Palpitations] : no palpitations [Leg Claudication] : no leg claudication [Lower Ext Edema] : no lower extremity edema [Orthopnea] : no orthopnea [Muscle Weakness] : muscle weakness [Unsteady Walking] : ataxia [Insomnia] : insomnia [Anxiety] : anxiety [Negative] : Heme/Lymph [FreeTextEntry9] : Ambulating with walker Or cane [de-identified] : Nonhealing skin lesion of nose

## 2021-07-08 NOTE — HISTORY OF PRESENT ILLNESS
[Family Member] : family member [FreeTextEntry1] : Please see HPI [de-identified] : Patient is an 89-year-old female who presents today for follow-up and disease management she is brought in by her son and daughter-in-law. Patient states that she is in her usual state of health her gait has worsened definitely needs cane or walker to ambulate. Medical history is significant for skin lesion of the tip of the nose that is been present for greater than 1 year patient still has not been seen by dermatology the daughter-in-law admits that ever since the pandemic started she has not had the capability of bringing her and the patient herself did not want to go. The lesion itself is actually getting bigger it appears more destructive than the last time it was seen it appears to be a basal cell carcinoma but to know for certain we need biopsy and most likely a Mohs procedure. History is significant for diabetes mellitus type 2, hypertension, generalized anxiety depression has resolved, hyperlipidemia history of pulmonary embolus and unsteady gait.

## 2021-07-08 NOTE — ASSESSMENT
[FreeTextEntry1] : Assessment and plan:\par \par 1. Nonhealing skin lesion tip of nose. I recommend dermatology evaluation and biopsy had a long and detailed discussion with patient and daughter-in-law. She finally agreed to be seen by dermatology.\par \par 2. Diabetes mellitus, type II check hemoglobin A1c blood work drawn in office. Continue no concentrated sweets, consistent carbohydrate diet continue metformin 500 mg twice a day.\par \par 3. Hypertension good blood pressure control. Continue amlodipine 5 mg p.o. daily, and lifestyle changes.\par \par 4. Generalized anxiety disorder well-controlled with alprazolam. Patient has been taking medications very sparingly and only as needed. There is no sign of abuse.\par \par 5. Hyperlipidemia. Continue low-fat, low-cholesterol diet. Increase physical activity as tolerated and continue atorvastatin 10 mg p.o. at bedtime.\par \par 6. History of pulmonary embolus continue anticoagulation Eliquis 5 mg p.o. twice a day.\par \par 7. Comprehensive blood work was drawn in office by examiner.\par \par 8.  Vitamin B12 deficiency vitamin B12 intramuscular injection administered at this visit.\par \par 9.  Unsteady gait detailed discussion with patient and family that his daughter-in-law and son regarding physical therapy and gait training presently they declined.

## 2021-07-09 LAB
ALBUMIN SERPL ELPH-MCNC: 4.6 G/DL
ALP BLD-CCNC: 68 U/L
ALT SERPL-CCNC: 19 U/L
ANION GAP SERPL CALC-SCNC: 16 MMOL/L
AST SERPL-CCNC: 19 U/L
BASOPHILS # BLD AUTO: 0.05 K/UL
BASOPHILS NFR BLD AUTO: 0.6 %
BILIRUB SERPL-MCNC: 0.4 MG/DL
BUN SERPL-MCNC: 25 MG/DL
CALCIUM SERPL-MCNC: 9.7 MG/DL
CHLORIDE SERPL-SCNC: 105 MMOL/L
CHOLEST SERPL-MCNC: 153 MG/DL
CO2 SERPL-SCNC: 20 MMOL/L
COVID-19 NUCLEOCAPSID  GAM ANTIBODY INTERPRETATION: NEGATIVE
COVID-19 SPIKE DOMAIN ANTIBODY INTERPRETATION: POSITIVE
CREAT SERPL-MCNC: 1.36 MG/DL
EOSINOPHIL # BLD AUTO: 0.1 K/UL
EOSINOPHIL NFR BLD AUTO: 1.2 %
ESTIMATED AVERAGE GLUCOSE: 157 MG/DL
FERRITIN SERPL-MCNC: 49 NG/ML
FOLATE SERPL-MCNC: >20 NG/ML
GLUCOSE SERPL-MCNC: 177 MG/DL
HBA1C MFR BLD HPLC: 7.1 %
HCT VFR BLD CALC: 40.9 %
HDLC SERPL-MCNC: 47 MG/DL
HGB BLD-MCNC: 13.1 G/DL
IMM GRANULOCYTES NFR BLD AUTO: 0.4 %
IRON SATN MFR SERPL: 26 %
IRON SERPL-MCNC: 65 UG/DL
LDLC SERPL CALC-MCNC: 55 MG/DL
LYMPHOCYTES # BLD AUTO: 2.41 K/UL
LYMPHOCYTES NFR BLD AUTO: 29.1 %
MAN DIFF?: NORMAL
MCHC RBC-ENTMCNC: 30.8 PG
MCHC RBC-ENTMCNC: 32 GM/DL
MCV RBC AUTO: 96.2 FL
MONOCYTES # BLD AUTO: 0.55 K/UL
MONOCYTES NFR BLD AUTO: 6.7 %
NEUTROPHILS # BLD AUTO: 5.13 K/UL
NEUTROPHILS NFR BLD AUTO: 62 %
NONHDLC SERPL-MCNC: 107 MG/DL
PLATELET # BLD AUTO: 229 K/UL
POTASSIUM SERPL-SCNC: 4.6 MMOL/L
PROT SERPL-MCNC: 7.3 G/DL
RBC # BLD: 4.25 M/UL
RBC # FLD: 14.6 %
SARS-COV-2 AB SERPL IA-ACNC: 79.2 U/ML
SARS-COV-2 AB SERPL QL IA: 0.09 INDEX
SODIUM SERPL-SCNC: 141 MMOL/L
TIBC SERPL-MCNC: 249 UG/DL
TRIGL SERPL-MCNC: 259 MG/DL
UIBC SERPL-MCNC: 184 UG/DL
VIT B12 SERPL-MCNC: 309 PG/ML
WBC # FLD AUTO: 8.27 K/UL

## 2021-07-19 ENCOUNTER — RX RENEWAL (OUTPATIENT)
Age: 86
End: 2021-07-19

## 2021-10-06 ENCOUNTER — APPOINTMENT (OUTPATIENT)
Dept: FAMILY MEDICINE | Facility: CLINIC | Age: 86
End: 2021-10-06
Payer: MEDICARE

## 2021-10-06 VITALS
TEMPERATURE: 97.1 F | RESPIRATION RATE: 18 BRPM | SYSTOLIC BLOOD PRESSURE: 160 MMHG | DIASTOLIC BLOOD PRESSURE: 64 MMHG | OXYGEN SATURATION: 95 % | BODY MASS INDEX: 28.27 KG/M2 | HEART RATE: 110 BPM | WEIGHT: 144 LBS | HEIGHT: 60 IN

## 2021-10-06 DIAGNOSIS — L98.9 DISORDER OF THE SKIN AND SUBCUTANEOUS TISSUE, UNSPECIFIED: ICD-10-CM

## 2021-10-06 DIAGNOSIS — Z23 ENCOUNTER FOR IMMUNIZATION: ICD-10-CM

## 2021-10-06 PROCEDURE — G0008: CPT

## 2021-10-06 PROCEDURE — 96372 THER/PROPH/DIAG INJ SC/IM: CPT

## 2021-10-06 PROCEDURE — 99214 OFFICE O/P EST MOD 30 MIN: CPT | Mod: 25

## 2021-10-06 PROCEDURE — 90686 IIV4 VACC NO PRSV 0.5 ML IM: CPT

## 2021-10-06 RX ORDER — CYANOCOBALAMIN 1000 UG/ML
1000 INJECTION INTRAMUSCULAR; SUBCUTANEOUS
Qty: 0 | Refills: 0 | Status: COMPLETED | OUTPATIENT
Start: 2021-10-06

## 2021-10-06 NOTE — REVIEW OF SYSTEMS
[Fatigue] : fatigue [Muscle Weakness] : muscle weakness [Unsteady Walking] : ataxia [Insomnia] : insomnia [Anxiety] : anxiety [Negative] : Heme/Lymph [Fever] : no fever [Chills] : no chills [Hot Flashes] : no hot flashes [Night Sweats] : no night sweats [Recent Change In Weight] : ~T no recent weight change [Chest Pain] : no chest pain [Palpitations] : no palpitations [Leg Claudication] : no leg claudication [Lower Ext Edema] : no lower extremity edema [Orthopnea] : no orthopnea [FreeTextEntry9] : Ambulating with walker Or cane [de-identified] : Nonhealing skin lesion of nose patient has been evaluated by dermatology and debridement is being done on a regular basis.

## 2021-10-06 NOTE — PHYSICAL EXAM
[Well Nourished] : well nourished [Well Developed] : well developed [Well-Appearing] : well-appearing [Normal Voice/Communication] : normal voice/communication [Normal] : no CVA or spinal tenderness [No Joint Swelling] : no joint swelling [Speech Grossly Normal] : speech grossly normal [Memory Grossly Normal] : memory grossly normal [Normal Affect] : the affect was normal [Alert and Oriented x3] : oriented to person, place, and time [Normal Mood] : the mood was normal [Normal Insight/Judgement] : insight and judgment were intact [de-identified] : Weakness [de-identified] : Nonhealing skin lesion tip of nose [de-identified] : Unsteady gait ambulates with cane [de-identified] : Anxiety well controlled insomnia, worsening

## 2021-10-06 NOTE — HISTORY OF PRESENT ILLNESS
[Family Member] : family member [FreeTextEntry1] : Please see HPI. [de-identified] : Patient is an 89-year-old female who presents today for follow-up and disease management patient is accompanied by her family members her son and daughter-in-law.  At today's visit I will be addressing the nonhealing nose lesion which has been seen by dermatology and is being treated, diabetes mellitus type 2 non-insulin-requiring, hypertension, generalized anxiety disorder, hyperlipidemia, history of pulmonary embolus on long-term anticoagulation, B12 deficiency and unsteady gait.\par \par Presently the patient is awake alert and oriented x3 at times forgetful in no acute distress calm and cooperative.  At today's visit I will be administering vitamin B12 intramuscularly and Fluzone quadrivalent influenza vaccine.

## 2021-10-06 NOTE — ASSESSMENT
[FreeTextEntry1] : Assessment and plan:\par \par 1. Nonhealing skin lesion tip of nose.  Low-grade malignancy I do not have the report available but it appears to be a basal cell carcinoma which is being debrided by dermatology.\par \par 2. Diabetes mellitus, type II check hemoglobin A1c blood work drawn in office. Continue no concentrated sweets, consistent carbohydrate diet continue metformin 500 mg twice a day.\par \par 3. Hypertension good blood pressure control. Continue amlodipine 5 mg p.o. daily, and lifestyle changes.\par \par 4. Generalized anxiety disorder well-controlled with alprazolam. Patient has been taking medications very sparingly and only as needed. There is no sign of abuse.\par \par 5. Hyperlipidemia. Continue low-fat, low-cholesterol diet. Increase physical activity as tolerated and continue atorvastatin 10 mg p.o. at bedtime.\par \par 6. History of pulmonary embolus continue anticoagulation Eliquis 5 mg p.o. twice a day.\par \par 7. Comprehensive blood work reviewed with patient and family no change in medical management.\par \par 8.  Vitamin B12 deficiency vitamin B12 intramuscular injection administered at this visit.\par \par 9.  Unsteady gait detailed discussion with patient and family  daughter-in-law and son regarding physical therapy and gait training presently they declined.\par \par 10.  Health maintenance issues discussed with patient up-to-date with COVID-19 vaccination at today's visit Fluzone quadrivalent influenza vaccine administered

## 2021-11-10 ENCOUNTER — RX RENEWAL (OUTPATIENT)
Age: 86
End: 2021-11-10

## 2022-01-24 ENCOUNTER — RX RENEWAL (OUTPATIENT)
Age: 87
End: 2022-01-24

## 2022-02-20 ENCOUNTER — RX RENEWAL (OUTPATIENT)
Age: 87
End: 2022-02-20

## 2022-03-23 ENCOUNTER — RX RENEWAL (OUTPATIENT)
Age: 87
End: 2022-03-23

## 2022-04-11 ENCOUNTER — APPOINTMENT (OUTPATIENT)
Dept: FAMILY MEDICINE | Facility: CLINIC | Age: 87
End: 2022-04-11
Payer: MEDICARE

## 2022-04-11 VITALS
HEART RATE: 103 BPM | WEIGHT: 137 LBS | HEIGHT: 60 IN | TEMPERATURE: 97.6 F | RESPIRATION RATE: 16 BRPM | DIASTOLIC BLOOD PRESSURE: 73 MMHG | BODY MASS INDEX: 26.9 KG/M2 | OXYGEN SATURATION: 96 % | SYSTOLIC BLOOD PRESSURE: 138 MMHG

## 2022-04-11 PROCEDURE — 96372 THER/PROPH/DIAG INJ SC/IM: CPT

## 2022-04-11 PROCEDURE — 36415 COLL VENOUS BLD VENIPUNCTURE: CPT

## 2022-04-11 PROCEDURE — 99214 OFFICE O/P EST MOD 30 MIN: CPT | Mod: 25

## 2022-04-11 RX ORDER — CYANOCOBALAMIN 1000 UG/ML
1000 INJECTION INTRAMUSCULAR; SUBCUTANEOUS
Qty: 0 | Refills: 0 | Status: COMPLETED | OUTPATIENT
Start: 2022-04-11

## 2022-04-11 RX ADMIN — CYANOCOBALAMIN 0 MCG/ML: 1000 INJECTION, SOLUTION INTRAMUSCULAR at 00:00

## 2022-04-11 NOTE — PHYSICAL EXAM
[Well Nourished] : well nourished [Well Developed] : well developed [Well-Appearing] : well-appearing [Normal Voice/Communication] : normal voice/communication [Normal Sclera/Conjunctiva] : normal sclera/conjunctiva [Normal] : no CVA or spinal tenderness [No Joint Swelling] : no joint swelling [Speech Grossly Normal] : speech grossly normal [Memory Grossly Normal] : memory grossly normal [Normal Affect] : the affect was normal [Alert and Oriented x3] : oriented to person, place, and time [Normal Mood] : the mood was normal [Normal Insight/Judgement] : insight and judgment were intact [de-identified] : Weakness [de-identified] : Nonhealing skin lesion tip of nose [de-identified] : Unsteady gait ambulates with cane [de-identified] : Anxiety well controlled insomnia, worsening

## 2022-04-11 NOTE — ASSESSMENT
[FreeTextEntry1] : Assessment and plan:\par \par 1.  Basal cell carcinoma nose patient does have follow-up with dermatology for further excision.\par \par 2. Diabetes mellitus, type II check hemoglobin A1c blood work drawn in office. Continue no concentrated sweets, consistent carbohydrate diet continue metformin 500 mg twice a day.  Most recent hemoglobin A1c 7.1 at today's visit comprehensive blood work was drawn.\par \par 3. Hypertension good blood pressure control. Continue amlodipine 5 mg p.o. daily, and lifestyle changes.\par \par 4. Generalized anxiety disorder well-controlled with alprazolam. Patient has been taking medications very sparingly and only as needed. There is no sign of abuse.\par \par 5. Hyperlipidemia. Continue low-fat, low-cholesterol diet. Increase physical activity as tolerated and continue atorvastatin 10 mg p.o. at bedtime.\par \par 6. History of pulmonary embolus continue anticoagulation Eliquis 5 mg p.o. twice a day.\par \par 7. Comprehensive blood work reviewed with patient and family no change in medical management.\par \par 8.  Vitamin B12 deficiency vitamin B12 intramuscular injection administered at this visit.\par \par 9.  Unsteady gait detailed discussion with patient and family  daughter-in-law and son regarding physical therapy and gait training presently they declined.\par \par 10.  Patient is up-to-date with COVID-19 vaccination she is candidate for booster and subsequently also candidate for second booster.

## 2022-04-11 NOTE — HEALTH RISK ASSESSMENT
[Never] : Never [Yes] : Yes [2 - 3 times a week (3 pts)] : 2 - 3  times a week (3 points) [1 or 2 (0 pts)] : 1 or 2 (0 points) [Never (0 pts)] : Never (0 points) [No falls in past year] : Patient reported no falls in the past year [0] : 2) Feeling down, depressed, or hopeless: Not at all (0) [PHQ-2 Negative - No further assessment needed] : PHQ-2 Negative - No further assessment needed [Audit-CScore] : 3 [[id number]] : 0

## 2022-04-11 NOTE — HISTORY OF PRESENT ILLNESS
[FreeTextEntry1] : See HPI. [de-identified] : Patient is an 89-year-old female who presents today for follow-up and disease management medical history is significant for skin CA of nose basal cell carcinoma patient has follow-up to have the rest of the lesion excised, we will be addressing also diabetes mellitus type 2 most recent hemoglobin A1c 7.1, hypertension, anxiety, hyperlipidemia, history of PE patient presently doing well, B12 deficiency and gait instability patient ambulates with cane and walker.

## 2022-04-11 NOTE — REVIEW OF SYSTEMS
[Fatigue] : fatigue [Muscle Weakness] : muscle weakness [Unsteady Walking] : ataxia [Insomnia] : insomnia [Anxiety] : anxiety [Negative] : Heme/Lymph [Fever] : no fever [Chills] : no chills [Hot Flashes] : no hot flashes [Night Sweats] : no night sweats [Recent Change In Weight] : ~T no recent weight change [Chest Pain] : no chest pain [Palpitations] : no palpitations [Leg Claudication] : no leg claudication [Lower Ext Edema] : no lower extremity edema [Orthopnea] : no orthopnea [FreeTextEntry9] : Ambulating with walker Or cane [de-identified] : Nonhealing skin lesion of nose patient has been evaluated by dermatology and debridement is being done on a regular basis.

## 2022-04-12 LAB
ALBUMIN SERPL ELPH-MCNC: 4.3 G/DL
ALP BLD-CCNC: 59 U/L
ALT SERPL-CCNC: 17 U/L
ANION GAP SERPL CALC-SCNC: 13 MMOL/L
AST SERPL-CCNC: 18 U/L
BASOPHILS # BLD AUTO: 0.07 K/UL
BASOPHILS NFR BLD AUTO: 0.9 %
BILIRUB SERPL-MCNC: 0.4 MG/DL
BUN SERPL-MCNC: 24 MG/DL
CALCIUM SERPL-MCNC: 9.7 MG/DL
CHLORIDE SERPL-SCNC: 105 MMOL/L
CHOLEST SERPL-MCNC: 146 MG/DL
CO2 SERPL-SCNC: 21 MMOL/L
CREAT SERPL-MCNC: 1.38 MG/DL
EGFR: 37 ML/MIN/1.73M2
EOSINOPHIL # BLD AUTO: 0.14 K/UL
EOSINOPHIL NFR BLD AUTO: 1.8 %
ESTIMATED AVERAGE GLUCOSE: 151 MG/DL
FERRITIN SERPL-MCNC: 47 NG/ML
FOLATE SERPL-MCNC: >20 NG/ML
GLUCOSE SERPL-MCNC: 151 MG/DL
HBA1C MFR BLD HPLC: 6.9 %
HCT VFR BLD CALC: 38.6 %
HDLC SERPL-MCNC: 44 MG/DL
HGB BLD-MCNC: 12.7 G/DL
IMM GRANULOCYTES NFR BLD AUTO: 1.3 %
IRON SATN MFR SERPL: 25 %
IRON SERPL-MCNC: 57 UG/DL
LDLC SERPL CALC-MCNC: 56 MG/DL
LYMPHOCYTES # BLD AUTO: 2.39 K/UL
LYMPHOCYTES NFR BLD AUTO: 30.5 %
MAN DIFF?: NORMAL
MCHC RBC-ENTMCNC: 30 PG
MCHC RBC-ENTMCNC: 32.9 GM/DL
MCV RBC AUTO: 91.3 FL
MONOCYTES # BLD AUTO: 0.63 K/UL
MONOCYTES NFR BLD AUTO: 8 %
NEUTROPHILS # BLD AUTO: 4.5 K/UL
NEUTROPHILS NFR BLD AUTO: 57.5 %
NONHDLC SERPL-MCNC: 103 MG/DL
PLATELET # BLD AUTO: 241 K/UL
POTASSIUM SERPL-SCNC: 4.4 MMOL/L
PROT SERPL-MCNC: 7.2 G/DL
RBC # BLD: 4.23 M/UL
RBC # FLD: 14.3 %
SODIUM SERPL-SCNC: 140 MMOL/L
TIBC SERPL-MCNC: 234 UG/DL
TRIGL SERPL-MCNC: 236 MG/DL
UIBC SERPL-MCNC: 176 UG/DL
VIT B12 SERPL-MCNC: 285 PG/ML
WBC # FLD AUTO: 7.83 K/UL

## 2022-07-09 ENCOUNTER — RX RENEWAL (OUTPATIENT)
Age: 87
End: 2022-07-09

## 2022-08-17 ENCOUNTER — RX RENEWAL (OUTPATIENT)
Age: 87
End: 2022-08-17

## 2022-08-27 NOTE — DISCHARGE NOTE ADULT - PRINCIPAL DIAGNOSIS
Closed fracture of left hip, initial encounter
I have personally provided the amount of critical care time documented below concurrently with the resident/fellow.  This time excludes time spent on separate procedures and time spent teaching. I have reviewed the resident’s / fellow’s documentation and I agree with the history, exam, and assessment and plan of care.

## 2022-10-11 ENCOUNTER — APPOINTMENT (OUTPATIENT)
Dept: FAMILY MEDICINE | Facility: CLINIC | Age: 87
End: 2022-10-11

## 2022-10-11 VITALS
HEIGHT: 60 IN | HEART RATE: 102 BPM | SYSTOLIC BLOOD PRESSURE: 145 MMHG | WEIGHT: 128.5 LBS | OXYGEN SATURATION: 98 % | DIASTOLIC BLOOD PRESSURE: 77 MMHG | BODY MASS INDEX: 25.23 KG/M2 | TEMPERATURE: 98 F | RESPIRATION RATE: 14 BRPM

## 2022-10-11 DIAGNOSIS — F32.A ANXIETY DISORDER, UNSPECIFIED: ICD-10-CM

## 2022-10-11 DIAGNOSIS — F41.9 ANXIETY DISORDER, UNSPECIFIED: ICD-10-CM

## 2022-10-11 PROCEDURE — G0008: CPT

## 2022-10-11 PROCEDURE — 99215 OFFICE O/P EST HI 40 MIN: CPT | Mod: 25

## 2022-10-11 PROCEDURE — 36415 COLL VENOUS BLD VENIPUNCTURE: CPT

## 2022-10-11 PROCEDURE — 90686 IIV4 VACC NO PRSV 0.5 ML IM: CPT

## 2022-10-11 PROCEDURE — 96372 THER/PROPH/DIAG INJ SC/IM: CPT

## 2022-10-11 RX ORDER — CYANOCOBALAMIN 1000 UG/ML
1000 INJECTION INTRAMUSCULAR; SUBCUTANEOUS
Qty: 0 | Refills: 0 | Status: COMPLETED | OUTPATIENT
Start: 2022-10-11

## 2022-10-11 RX ADMIN — CYANOCOBALAMIN 0 MCG/ML: 1000 INJECTION INTRAMUSCULAR; SUBCUTANEOUS at 00:00

## 2022-10-11 NOTE — HISTORY OF PRESENT ILLNESS
[Family Member] : family member [FreeTextEntry1] : DC HPI [de-identified] : Patient is a 89-year-old female who presents today for follow-up and disease management patient is accompanied by family members.  Medical history is significant for basal cell CA localized to the tip of the nose unfortunately it still present and patient does have follow-up with dermatology.  History is also significant for diabetes, hypertension, generalized anxiety, hyperlipidemia, history of PE, and vitamin B12 deficiency patient also has underlying gait disturbance.

## 2022-10-11 NOTE — HEALTH RISK ASSESSMENT
[Never] : Never [Yes] : Yes [2 - 3 times a week (3 pts)] : 2 - 3  times a week (3 points) [1 or 2 (0 pts)] : 1 or 2 (0 points) [Never (0 pts)] : Never (0 points) [No] : In the past 12 months have you used drugs other than those required for medical reasons? No [One fall no injury in past year] : Patient reported one fall in the past year without injury [I have developed a follow-up plan documented below in the note.] : I have developed a follow-up plan documented below in the note. [Audit-CScore] : 3

## 2022-10-11 NOTE — ASSESSMENT
[FreeTextEntry1] : Assessment and plan:\par \par 1.  Basal cell carcinoma nose patient does have follow-up with dermatology for further excision.\par \par 2. Diabetes mellitus, type II check hemoglobin A1c blood work drawn in office. Continue no concentrated sweets, consistent carbohydrate diet continue metformin 500 mg twice a day.  Most recent hemoglobin A1c 6.9  at today's visit comprehensive blood work was drawn.\par \par 3. Hypertension good blood pressure control. Continue amlodipine 5 mg p.o. daily, and lifestyle changes.\par \par 4. Generalized anxiety disorder well-controlled with alprazolam. Patient has been taking medications very sparingly and only as needed. There is no sign of abuse.\par \par 5. Hyperlipidemia. Continue low-fat, low-cholesterol diet. Increase physical activity as tolerated and continue atorvastatin 10 mg p.o. at bedtime.\par \par 6. History of pulmonary embolus continue anticoagulation Eliquis 5 mg p.o. twice a day.\par \par 7.  Influenza vaccine Fluzone quadrivalent left deltoid administered at today's visit.\par \par 8.  Vitamin B12 deficiency vitamin B12 intramuscular injection administered at this visit.\par \par 9.  Unsteady gait detailed discussion with patient and family  daughter-in-law and granddaughter regarding physical therapy and gait training presently they declined.\par \par 10.  Patient is up-to-date with COVID-19 vaccination she is candidate for by valent booster\par \par 11.  Total time spent face-to-face and non-face-to-face time 45 minutes a good portion of that time was spent on counseling and coordination of care regarding the basal cell carcinoma that the patient has on her nose I discussed with them the fact that it might not be a lesion that metastasizes but it is locally invasive and disfiguring.

## 2022-10-11 NOTE — PHYSICAL EXAM
[No Acute Distress] : no acute distress [Well Nourished] : well nourished [Well Developed] : well developed [Well-Appearing] : well-appearing [Normal Voice/Communication] : normal voice/communication [Normal Sclera/Conjunctiva] : normal sclera/conjunctiva [Normal] : no CVA or spinal tenderness [No Joint Swelling] : no joint swelling [Speech Grossly Normal] : speech grossly normal [Memory Grossly Normal] : memory grossly normal [Normal Affect] : the affect was normal [Alert and Oriented x3] : oriented to person, place, and time [Normal Mood] : the mood was normal [Normal Insight/Judgement] : insight and judgment were intact [de-identified] : Skin lesion tip of nose hyperpigmented the biopsy showed basal cell [de-identified] : Weakness [de-identified] : Nonhealing skin lesion tip of nose [de-identified] : Unsteady gait ambulates with cane [de-identified] : Anxiety well controlled insomnia, worsening

## 2022-10-11 NOTE — REVIEW OF SYSTEMS
[Fatigue] : fatigue [Muscle Weakness] : muscle weakness [Unsteady Walking] : ataxia [Insomnia] : insomnia [Anxiety] : anxiety [Negative] : Heme/Lymph [Fever] : no fever [Chills] : no chills [Hot Flashes] : no hot flashes [Night Sweats] : no night sweats [Recent Change In Weight] : ~T no recent weight change [Chest Pain] : no chest pain [Palpitations] : no palpitations [Leg Claudication] : no leg claudication [Lower Ext Edema] : no lower extremity edema [Orthopnea] : no orthopnea [FreeTextEntry9] : Ambulating with walker Or cane [de-identified] : Nonhealing skin lesion of nose patient has been evaluated by dermatology and debridement is being done on a regular basis.

## 2022-10-12 LAB
ALBUMIN SERPL ELPH-MCNC: 4.4 G/DL
ALP BLD-CCNC: 58 U/L
ALT SERPL-CCNC: 11 U/L
ANION GAP SERPL CALC-SCNC: 18 MMOL/L
AST SERPL-CCNC: 14 U/L
BASOPHILS # BLD AUTO: 0.07 K/UL
BASOPHILS NFR BLD AUTO: 0.9 %
BILIRUB SERPL-MCNC: 0.5 MG/DL
BUN SERPL-MCNC: 24 MG/DL
CALCIUM SERPL-MCNC: 9.7 MG/DL
CHLORIDE SERPL-SCNC: 105 MMOL/L
CHOLEST SERPL-MCNC: 143 MG/DL
CO2 SERPL-SCNC: 18 MMOL/L
CREAT SERPL-MCNC: 1.29 MG/DL
EGFR: 39 ML/MIN/1.73M2
EOSINOPHIL # BLD AUTO: 0.12 K/UL
EOSINOPHIL NFR BLD AUTO: 1.6 %
ESTIMATED AVERAGE GLUCOSE: 134 MG/DL
FERRITIN SERPL-MCNC: 61 NG/ML
FOLATE SERPL-MCNC: >20 NG/ML
GLUCOSE SERPL-MCNC: 166 MG/DL
HBA1C MFR BLD HPLC: 6.3 %
HCT VFR BLD CALC: 39.5 %
HDLC SERPL-MCNC: 50 MG/DL
HGB BLD-MCNC: 12.6 G/DL
IMM GRANULOCYTES NFR BLD AUTO: 0.4 %
IRON SATN MFR SERPL: 27 %
IRON SERPL-MCNC: 66 UG/DL
LDLC SERPL CALC-MCNC: 59 MG/DL
LYMPHOCYTES # BLD AUTO: 1.61 K/UL
LYMPHOCYTES NFR BLD AUTO: 21.5 %
MAN DIFF?: NORMAL
MCHC RBC-ENTMCNC: 30.1 PG
MCHC RBC-ENTMCNC: 31.9 GM/DL
MCV RBC AUTO: 94.3 FL
MONOCYTES # BLD AUTO: 0.54 K/UL
MONOCYTES NFR BLD AUTO: 7.2 %
NEUTROPHILS # BLD AUTO: 5.11 K/UL
NEUTROPHILS NFR BLD AUTO: 68.4 %
NONHDLC SERPL-MCNC: 93 MG/DL
PLATELET # BLD AUTO: 226 K/UL
POTASSIUM SERPL-SCNC: 4.1 MMOL/L
PROT SERPL-MCNC: 7.4 G/DL
RBC # BLD: 4.19 M/UL
RBC # FLD: 14.5 %
SODIUM SERPL-SCNC: 141 MMOL/L
TIBC SERPL-MCNC: 248 UG/DL
TRIGL SERPL-MCNC: 173 MG/DL
UIBC SERPL-MCNC: 181 UG/DL
VIT B12 SERPL-MCNC: 745 PG/ML
WBC # FLD AUTO: 7.48 K/UL

## 2023-01-20 ENCOUNTER — RX RENEWAL (OUTPATIENT)
Age: 88
End: 2023-01-20

## 2023-03-12 ENCOUNTER — RX RENEWAL (OUTPATIENT)
Age: 88
End: 2023-03-12

## 2023-03-20 ENCOUNTER — RX RENEWAL (OUTPATIENT)
Age: 88
End: 2023-03-20

## 2023-06-29 ENCOUNTER — RX RENEWAL (OUTPATIENT)
Age: 88
End: 2023-06-29

## 2023-07-06 ENCOUNTER — APPOINTMENT (OUTPATIENT)
Dept: FAMILY MEDICINE | Facility: CLINIC | Age: 88
End: 2023-07-06

## 2023-09-03 ENCOUNTER — RX RENEWAL (OUTPATIENT)
Age: 88
End: 2023-09-03

## 2023-09-26 NOTE — PATIENT PROFILE ADULT - OVER THE PAST TWO WEEKS, HAVE YOU FELT LITTLE INTEREST OR PLEASURE IN DOING THINGS?
Sotyktu Counseling:  I discussed the most common side effects of Sotyktu including: common cold, sore throat, sinus infections, cold sores, canker sores, folliculitis, and acne.? I also discussed more serious side effects of Sotyktu including but not limited to: serious allergic reactions; increased risk for infections such as TB; cancers such as lymphomas; rhabdomyolysis and elevated CPK; and elevated triglycerides and liver enzymes.? no

## 2023-10-02 ENCOUNTER — APPOINTMENT (OUTPATIENT)
Dept: FAMILY MEDICINE | Facility: CLINIC | Age: 88
End: 2023-10-02
Payer: MEDICARE

## 2023-10-02 VITALS
TEMPERATURE: 97.3 F | HEIGHT: 60 IN | HEART RATE: 98 BPM | SYSTOLIC BLOOD PRESSURE: 140 MMHG | WEIGHT: 119 LBS | OXYGEN SATURATION: 97 % | DIASTOLIC BLOOD PRESSURE: 70 MMHG | RESPIRATION RATE: 16 BRPM | BODY MASS INDEX: 23.36 KG/M2

## 2023-10-02 DIAGNOSIS — F41.1 GENERALIZED ANXIETY DISORDER: ICD-10-CM

## 2023-10-02 DIAGNOSIS — I10 ESSENTIAL (PRIMARY) HYPERTENSION: ICD-10-CM

## 2023-10-02 DIAGNOSIS — I26.99 OTHER PULMONARY EMBOLISM W/OUT ACUTE COR PULMONALE: ICD-10-CM

## 2023-10-02 DIAGNOSIS — E11.9 TYPE 2 DIABETES MELLITUS W/OUT COMPLICATIONS: ICD-10-CM

## 2023-10-02 DIAGNOSIS — R26.81 UNSTEADINESS ON FEET: ICD-10-CM

## 2023-10-02 DIAGNOSIS — C44.90 UNSPECIFIED MALIGNANT NEOPLASM OF SKIN, UNSPECIFIED: ICD-10-CM

## 2023-10-02 DIAGNOSIS — E78.5 HYPERLIPIDEMIA, UNSPECIFIED: ICD-10-CM

## 2023-10-02 DIAGNOSIS — E53.8 DEFICIENCY OF OTHER SPECIFIED B GROUP VITAMINS: ICD-10-CM

## 2023-10-02 PROCEDURE — G0008: CPT

## 2023-10-02 PROCEDURE — 96372 THER/PROPH/DIAG INJ SC/IM: CPT

## 2023-10-02 PROCEDURE — 90662 IIV NO PRSV INCREASED AG IM: CPT

## 2023-10-02 PROCEDURE — 99215 OFFICE O/P EST HI 40 MIN: CPT | Mod: 25

## 2023-10-02 RX ORDER — CYANOCOBALAMIN 1000 UG/ML
1000 INJECTION INTRAMUSCULAR; SUBCUTANEOUS
Qty: 0 | Refills: 0 | Status: COMPLETED | OUTPATIENT
Start: 2023-10-02

## 2023-10-02 RX ADMIN — CYANOCOBALAMIN 1 MCG/ML: 1000 INJECTION INTRAMUSCULAR; SUBCUTANEOUS at 00:00

## 2023-12-11 ENCOUNTER — RX RENEWAL (OUTPATIENT)
Age: 88
End: 2023-12-11

## 2023-12-11 RX ORDER — MIRTAZAPINE 7.5 MG/1
7.5 TABLET, FILM COATED ORAL
Qty: 90 | Refills: 3 | Status: ACTIVE | COMMUNITY
Start: 2019-09-23 | End: 1900-01-01

## 2024-03-06 ENCOUNTER — RX RENEWAL (OUTPATIENT)
Age: 89
End: 2024-03-06

## 2024-03-06 RX ORDER — APIXABAN 5 MG/1
5 TABLET, FILM COATED ORAL
Qty: 180 | Refills: 1 | Status: ACTIVE | COMMUNITY
Start: 2019-01-18 | End: 1900-01-01

## 2024-03-13 ENCOUNTER — RX RENEWAL (OUTPATIENT)
Age: 89
End: 2024-03-13

## 2024-04-03 ENCOUNTER — APPOINTMENT (OUTPATIENT)
Dept: FAMILY MEDICINE | Facility: CLINIC | Age: 89
End: 2024-04-03

## 2024-06-28 ENCOUNTER — RX RENEWAL (OUTPATIENT)
Age: 89
End: 2024-06-28

## 2024-08-30 ENCOUNTER — APPOINTMENT (OUTPATIENT)
Dept: FAMILY MEDICINE | Facility: CLINIC | Age: 89
End: 2024-08-30

## 2024-08-30 VITALS
BODY MASS INDEX: 23.36 KG/M2 | OXYGEN SATURATION: 97 % | HEART RATE: 106 BPM | HEIGHT: 60 IN | WEIGHT: 119 LBS | RESPIRATION RATE: 16 BRPM | DIASTOLIC BLOOD PRESSURE: 78 MMHG | SYSTOLIC BLOOD PRESSURE: 122 MMHG

## 2024-08-30 DIAGNOSIS — I10 ESSENTIAL (PRIMARY) HYPERTENSION: ICD-10-CM

## 2024-08-30 DIAGNOSIS — E78.5 HYPERLIPIDEMIA, UNSPECIFIED: ICD-10-CM

## 2024-08-30 DIAGNOSIS — I26.99 OTHER PULMONARY EMBOLISM W/OUT ACUTE COR PULMONALE: ICD-10-CM

## 2024-08-30 DIAGNOSIS — G47.00 INSOMNIA, UNSPECIFIED: ICD-10-CM

## 2024-08-30 DIAGNOSIS — F41.9 ANXIETY DISORDER, UNSPECIFIED: ICD-10-CM

## 2024-08-30 DIAGNOSIS — E53.8 DEFICIENCY OF OTHER SPECIFIED B GROUP VITAMINS: ICD-10-CM

## 2024-08-30 DIAGNOSIS — F32.A ANXIETY DISORDER, UNSPECIFIED: ICD-10-CM

## 2024-08-30 DIAGNOSIS — C44.90 UNSPECIFIED MALIGNANT NEOPLASM OF SKIN, UNSPECIFIED: ICD-10-CM

## 2024-08-30 DIAGNOSIS — L98.9 DISORDER OF THE SKIN AND SUBCUTANEOUS TISSUE, UNSPECIFIED: ICD-10-CM

## 2024-08-30 DIAGNOSIS — R26.81 UNSTEADINESS ON FEET: ICD-10-CM

## 2024-08-30 DIAGNOSIS — E11.9 TYPE 2 DIABETES MELLITUS W/OUT COMPLICATIONS: ICD-10-CM

## 2024-08-30 PROCEDURE — 96372 THER/PROPH/DIAG INJ SC/IM: CPT

## 2024-08-30 PROCEDURE — 36415 COLL VENOUS BLD VENIPUNCTURE: CPT

## 2024-08-30 PROCEDURE — 99215 OFFICE O/P EST HI 40 MIN: CPT | Mod: 25

## 2024-08-30 RX ORDER — CYANOCOBALAMIN 1000 UG/ML
1000 INJECTION INTRAMUSCULAR; SUBCUTANEOUS
Qty: 0 | Refills: 0 | Status: COMPLETED | OUTPATIENT
Start: 2024-08-30

## 2024-08-30 RX ADMIN — CYANOCOBALAMIN 0 MCG/ML: 1000 INJECTION INTRAMUSCULAR; SUBCUTANEOUS at 00:00

## 2024-08-30 NOTE — HEALTH RISK ASSESSMENT
[Yes] : Yes [2 - 4 times a month (2 pts)] : 2-4 times a month (2 points) [1 or 2 (0 pts)] : 1 or 2 (0 points) [Never (0 pts)] : Never (0 points) [No] : In the past 12 months have you used drugs other than those required for medical reasons? No [No falls in past year] : Patient reported no falls in the past year [Little interest or pleasure doing things] : 1) Little interest or pleasure doing things [Feeling down, depressed, or hopeless] : 2) Feeling down, depressed, or hopeless [0] : 2) Feeling down, depressed, or hopeless: Not at all (0) [PHQ-2 Negative - No further assessment needed] : PHQ-2 Negative - No further assessment needed [With Patient/Caregiver] : , with patient/caregiver [Reviewed no changes] : Reviewed, no changes [Designated Healthcare Proxy] : Designated healthcare proxy [Relationship: ___] : Relationship: [unfilled] [Aggressive treatment] : aggressive treatment [I will adhere to the patient's wishes.] : I will adhere to the patient's wishes. [Never] : Never [Audit-CScore] : 2 [[id number]] : 0 [AdvancecareDate] : 08/24

## 2024-08-30 NOTE — PHYSICAL EXAM
[No Acute Distress] : no acute distress [Well Nourished] : well nourished [Well Developed] : well developed [Well-Appearing] : well-appearing [Normal Voice/Communication] : normal voice/communication [Normal Sclera/Conjunctiva] : normal sclera/conjunctiva [No Respiratory Distress] : no respiratory distress  [No Accessory Muscle Use] : no accessory muscle use [Clear to Auscultation] : lungs were clear to auscultation bilaterally [Normal Rate] : normal rate  [Regular Rhythm] : with a regular rhythm [Normal S1, S2] : normal S1 and S2 [No Carotid Bruits] : no carotid bruits [No Edema] : there was no peripheral edema [Soft] : abdomen soft [Non Tender] : non-tender [Non-distended] : non-distended [No Masses] : no abdominal mass palpated [Normal Bowel Sounds] : normal bowel sounds [Normal] : soft, non-tender, non-distended, no masses palpated, no HSM and normal bowel sounds [Normal Posterior Cervical Nodes] : no posterior cervical lymphadenopathy [Normal Anterior Cervical Nodes] : no anterior cervical lymphadenopathy [No Joint Swelling] : no joint swelling [Memory Grossly Normal] : memory grossly normal [Normal Affect] : the affect was normal [Alert and Oriented x3] : oriented to person, place, and time [Normal Mood] : the mood was normal [Normal Insight/Judgement] : insight and judgment were intact [de-identified] : Skin lesion tip of nose hyperpigmented the biopsy showed basal cell [de-identified] : Frail [de-identified] : Not examined [de-identified] : Patient ambulating with walker [de-identified] : Basal cell carcinoma tip of nose which is being followed by dermatology basically treatment has been discontinued due to the fact the patient is on anticoagulation and excessive bleeding with each debridement. [de-identified] : Unsteady gait patient ambulating with walker high risk of falls discussed with daughter-in-law [de-identified] : Anxiety well controlled insomnia, worsening

## 2024-08-30 NOTE — ASSESSMENT
[FreeTextEntry1] : Assessment and plan:  1.  Basal cell carcinoma nose dermatology has discontinued debridement due to excess bleeding and also to avoid discomfort in fact patient herself does not want any further treatment.  The lesion is destructive and treatment now would be to excise patient's nose therefore conservative management local treatment patient is in no discomfort.  2. Diabetes mellitus, type II check hemoglobin A1c blood work drawn in office. Continue no concentrated sweets, consistent carbohydrate diet continue metformin 500 mg twice a day.  Most recent hemoglobin A1c 6.3 at today's visit comprehensive blood work was to be drawn patient declines any blood work her daughter-in-law also tried to convince her patient does not want any blood draws.  3. Hypertension good blood pressure control. Continue amlodipine 5 mg p.o. daily, and lifestyle changes.  4. Generalized anxiety disorder well-controlled according to the patient's daughter-in-law she has not been taking alprazolam any longer but she does have it available.  5. Hyperlipidemia. Continue low-fat, low-cholesterol diet. Increase physical activity as tolerated and continue atorvastatin 10 mg p.o. at bedtime.  6. History of pulmonary embolus continue anticoagulation Eliquis 5 mg p.o. twice a day.  7.  Influenza vaccine Fluzone quadrivalent left deltoid administered at today's visit.  8.  Vitamin B12 deficiency vitamin B12 intramuscular injection administered at this visit.  9.  Unsteady gait detailed discussion with patient and family  daughter-in-law and granddaughter regarding physical therapy and gait training presently they declined.  10.  Total time spent face-to-face and non-face-to-face time 45 minutes a good portion of that time was spent on counseling and coordination of care regarding the basal cell carcinoma that the patient has on her nose I discussed with them the fact that it might not be a lesion that metastasizes but it is locally invasive and disfiguring.

## 2024-08-30 NOTE — REVIEW OF SYSTEMS
[Fever] : no fever [Chills] : no chills [Fatigue] : fatigue [Hot Flashes] : no hot flashes [Night Sweats] : no night sweats [Recent Change In Weight] : ~T no recent weight change [Chest Pain] : no chest pain [Palpitations] : no palpitations [Leg Claudication] : no leg claudication [Lower Ext Edema] : no lower extremity edema [Orthopnea] : no orthopnea [Muscle Weakness] : muscle weakness [Unsteady Walking] : ataxia [Insomnia] : insomnia [Anxiety] : anxiety [Negative] : Heme/Lymph [FreeTextEntry9] : Ambulating with walker Or cane [de-identified] : Nonhealing skin lesion of nose patient has been evaluated by dermatology and debridement is being done on a regular basis.

## 2024-08-30 NOTE — HISTORY OF PRESENT ILLNESS
[Family Member] : family member [FreeTextEntry1] : Follow-up and disease management [de-identified] : Patient is a 89-year-old female who presents today for follow-up and disease management accompanied by both her daughter-in-law and grandson.  Patient has multiple underlying medical problems which include basal cell carcinoma of nose she has had multiple debridements where now the tip of her nose is basically gone, diabetes mellitus type 2, hypertension, generalized anxiety disorder, hyperlipidemia, history of pulmonary embolus, B12 deficiency and gait instability with high risk of falls.

## 2024-09-02 LAB
ALBUMIN SERPL ELPH-MCNC: 4.2 G/DL
ALP BLD-CCNC: 58 U/L
ALT SERPL-CCNC: 7 U/L
ANION GAP SERPL CALC-SCNC: 16 MMOL/L
AST SERPL-CCNC: 11 U/L
BASOPHILS # BLD AUTO: 0.08 K/UL
BASOPHILS NFR BLD AUTO: 0.9 %
BILIRUB SERPL-MCNC: 0.3 MG/DL
BUN SERPL-MCNC: 18 MG/DL
CALCIUM SERPL-MCNC: 9.6 MG/DL
CHLORIDE SERPL-SCNC: 105 MMOL/L
CHOLEST SERPL-MCNC: 149 MG/DL
CO2 SERPL-SCNC: 20 MMOL/L
CREAT SERPL-MCNC: 1.31 MG/DL
EGFR: 38 ML/MIN/1.73M2
EOSINOPHIL # BLD AUTO: 0.15 K/UL
EOSINOPHIL NFR BLD AUTO: 1.8 %
ESTIMATED AVERAGE GLUCOSE: 126 MG/DL
FERRITIN SERPL-MCNC: 59 NG/ML
FOLATE SERPL-MCNC: 13.7 NG/ML
GLUCOSE SERPL-MCNC: 108 MG/DL
HBA1C MFR BLD HPLC: 6 %
HCT VFR BLD CALC: 37.9 %
HDLC SERPL-MCNC: 47 MG/DL
HGB BLD-MCNC: 12.1 G/DL
IMM GRANULOCYTES NFR BLD AUTO: 0.2 %
IRON SATN MFR SERPL: 18 %
IRON SERPL-MCNC: 40 UG/DL
LDLC SERPL CALC-MCNC: 71 MG/DL
LYMPHOCYTES # BLD AUTO: 2.69 K/UL
LYMPHOCYTES NFR BLD AUTO: 31.6 %
MAN DIFF?: NORMAL
MCHC RBC-ENTMCNC: 29.7 PG
MCHC RBC-ENTMCNC: 31.9 GM/DL
MCV RBC AUTO: 92.9 FL
MONOCYTES # BLD AUTO: 0.64 K/UL
MONOCYTES NFR BLD AUTO: 7.5 %
NEUTROPHILS # BLD AUTO: 4.93 K/UL
NEUTROPHILS NFR BLD AUTO: 58 %
NONHDLC SERPL-MCNC: 102 MG/DL
PLATELET # BLD AUTO: 273 K/UL
POTASSIUM SERPL-SCNC: 4.6 MMOL/L
PROT SERPL-MCNC: 7.2 G/DL
RBC # BLD: 4.08 M/UL
RBC # FLD: 14.7 %
SODIUM SERPL-SCNC: 141 MMOL/L
TIBC SERPL-MCNC: 224 UG/DL
TRIGL SERPL-MCNC: 182 MG/DL
UIBC SERPL-MCNC: 184 UG/DL
VIT B12 SERPL-MCNC: 232 PG/ML
WBC # FLD AUTO: 8.51 K/UL

## 2024-09-04 ENCOUNTER — RX RENEWAL (OUTPATIENT)
Age: 89
End: 2024-09-04

## 2024-11-08 ENCOUNTER — NON-APPOINTMENT (OUTPATIENT)
Age: 89
End: 2024-11-08

## 2025-01-17 ENCOUNTER — RX RENEWAL (OUTPATIENT)
Age: 89
End: 2025-01-17

## 2025-03-04 ENCOUNTER — RX RENEWAL (OUTPATIENT)
Age: 89
End: 2025-03-04

## 2025-05-15 ENCOUNTER — APPOINTMENT (OUTPATIENT)
Dept: FAMILY MEDICINE | Facility: CLINIC | Age: 89
End: 2025-05-15
Payer: MEDICARE

## 2025-05-15 VITALS
TEMPERATURE: 97.3 F | BODY MASS INDEX: 23.56 KG/M2 | SYSTOLIC BLOOD PRESSURE: 124 MMHG | HEART RATE: 112 BPM | WEIGHT: 120 LBS | RESPIRATION RATE: 18 BRPM | OXYGEN SATURATION: 95 % | HEIGHT: 60 IN | DIASTOLIC BLOOD PRESSURE: 68 MMHG

## 2025-05-15 DIAGNOSIS — C44.90 UNSPECIFIED MALIGNANT NEOPLASM OF SKIN, UNSPECIFIED: ICD-10-CM

## 2025-05-15 DIAGNOSIS — F41.1 GENERALIZED ANXIETY DISORDER: ICD-10-CM

## 2025-05-15 DIAGNOSIS — E11.9 TYPE 2 DIABETES MELLITUS W/OUT COMPLICATIONS: ICD-10-CM

## 2025-05-15 DIAGNOSIS — R26.81 UNSTEADINESS ON FEET: ICD-10-CM

## 2025-05-15 DIAGNOSIS — I26.99 OTHER PULMONARY EMBOLISM W/OUT ACUTE COR PULMONALE: ICD-10-CM

## 2025-05-15 DIAGNOSIS — E78.5 HYPERLIPIDEMIA, UNSPECIFIED: ICD-10-CM

## 2025-05-15 DIAGNOSIS — L98.9 DISORDER OF THE SKIN AND SUBCUTANEOUS TISSUE, UNSPECIFIED: ICD-10-CM

## 2025-05-15 DIAGNOSIS — I10 ESSENTIAL (PRIMARY) HYPERTENSION: ICD-10-CM

## 2025-05-15 PROCEDURE — 96372 THER/PROPH/DIAG INJ SC/IM: CPT

## 2025-05-15 PROCEDURE — 99215 OFFICE O/P EST HI 40 MIN: CPT | Mod: 25

## 2025-05-15 RX ORDER — CYANOCOBALAMIN 1000 UG/ML
1000 INJECTION, SOLUTION INTRAMUSCULAR; SUBCUTANEOUS
Qty: 0 | Refills: 0 | Status: COMPLETED | OUTPATIENT
Start: 2025-05-15

## 2025-05-15 RX ADMIN — CYANOCOBALAMIN 0 MCG/ML: 1000 INJECTION, SOLUTION INTRAMUSCULAR; SUBCUTANEOUS at 00:00

## 2025-06-13 ENCOUNTER — RX RENEWAL (OUTPATIENT)
Age: 89
End: 2025-06-13

## 2025-09-03 ENCOUNTER — RX RENEWAL (OUTPATIENT)
Age: 89
End: 2025-09-03